# Patient Record
Sex: MALE | Race: BLACK OR AFRICAN AMERICAN | Employment: FULL TIME | URBAN - METROPOLITAN AREA
[De-identification: names, ages, dates, MRNs, and addresses within clinical notes are randomized per-mention and may not be internally consistent; named-entity substitution may affect disease eponyms.]

---

## 2018-10-04 ENCOUNTER — APPOINTMENT (OUTPATIENT)
Dept: GENERAL RADIOLOGY | Age: 48
End: 2018-10-04

## 2018-10-04 ENCOUNTER — HOSPITAL ENCOUNTER (EMERGENCY)
Age: 48
Discharge: HOME | End: 2018-10-04
Attending: EMERGENCY MEDICINE

## 2018-10-04 VITALS
BODY MASS INDEX: 22.13 KG/M2 | RESPIRATION RATE: 16 BRPM | TEMPERATURE: 98.1 F | SYSTOLIC BLOOD PRESSURE: 142 MMHG | HEIGHT: 67 IN | DIASTOLIC BLOOD PRESSURE: 94 MMHG | HEART RATE: 81 BPM | WEIGHT: 141 LBS | OXYGEN SATURATION: 99 %

## 2018-10-04 DIAGNOSIS — M89.8X1 PAIN OF LEFT SCAPULA: ICD-10-CM

## 2018-10-04 DIAGNOSIS — M25.532 LEFT WRIST PAIN: Primary | ICD-10-CM

## 2018-10-04 PROCEDURE — 6370000000 HC RX 637 (ALT 250 FOR IP): Performed by: EMERGENCY MEDICINE

## 2018-10-04 PROCEDURE — 73010 X-RAY EXAM OF SHOULDER BLADE: CPT

## 2018-10-04 PROCEDURE — 73110 X-RAY EXAM OF WRIST: CPT

## 2018-10-04 PROCEDURE — 99283 EMERGENCY DEPT VISIT LOW MDM: CPT

## 2018-10-04 RX ORDER — LISINOPRIL 40 MG/1
40 TABLET ORAL DAILY
COMMUNITY

## 2018-10-04 RX ORDER — METOPROLOL TARTRATE 50 MG/1
50 TABLET, FILM COATED ORAL 2 TIMES DAILY
COMMUNITY

## 2018-10-04 RX ORDER — IBUPROFEN 800 MG/1
800 TABLET ORAL EVERY 8 HOURS PRN
Qty: 30 TABLET | Refills: 0 | Status: SHIPPED | OUTPATIENT
Start: 2018-10-04

## 2018-10-04 RX ORDER — IBUPROFEN 800 MG/1
800 TABLET ORAL ONCE
Status: COMPLETED | OUTPATIENT
Start: 2018-10-04 | End: 2018-10-04

## 2018-10-04 RX ADMIN — IBUPROFEN 800 MG: 800 TABLET ORAL at 22:23

## 2018-10-04 ASSESSMENT — PAIN SCALES - GENERAL
PAINLEVEL_OUTOF10: 9
PAINLEVEL_OUTOF10: 9

## 2018-10-04 ASSESSMENT — PAIN DESCRIPTION - ORIENTATION: ORIENTATION: LEFT

## 2018-10-04 ASSESSMENT — ENCOUNTER SYMPTOMS
COLOR CHANGE: 0
SHORTNESS OF BREATH: 0
CHEST TIGHTNESS: 0
ABDOMINAL PAIN: 0
BACK PAIN: 0
NAUSEA: 0
VOMITING: 0

## 2018-10-04 ASSESSMENT — PAIN DESCRIPTION - PAIN TYPE: TYPE: ACUTE PAIN

## 2018-10-04 ASSESSMENT — PAIN DESCRIPTION - LOCATION: LOCATION: SHOULDER
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
## 2023-02-26 ENCOUNTER — HOSPITAL ENCOUNTER (OUTPATIENT)
Age: 53
Setting detail: OBSERVATION
Discharge: HOME | End: 2023-02-27
Attending: EMERGENCY MEDICINE | Admitting: EMERGENCY MEDICINE
Payer: COMMERCIAL

## 2023-02-26 ENCOUNTER — APPOINTMENT (OUTPATIENT)
Dept: GENERAL RADIOLOGY | Age: 53
End: 2023-02-26
Payer: COMMERCIAL

## 2023-02-26 DIAGNOSIS — R07.9 CHEST PAIN, UNSPECIFIED TYPE: Primary | ICD-10-CM

## 2023-02-26 LAB
ABSOLUTE EOS #: 0.21 K/UL (ref 0–0.44)
ABSOLUTE IMMATURE GRANULOCYTE: <0.03 K/UL (ref 0–0.3)
ABSOLUTE LYMPH #: 1.81 K/UL (ref 1.1–3.7)
ABSOLUTE MONO #: 0.46 K/UL (ref 0.1–1.2)
ANION GAP SERPL CALCULATED.3IONS-SCNC: 11 MMOL/L (ref 9–17)
ANION GAP SERPL CALCULATED.3IONS-SCNC: 13 MMOL/L (ref 9–17)
ANION GAP SERPL CALCULATED.3IONS-SCNC: 14 MMOL/L (ref 9–17)
BASOPHILS # BLD: 1 % (ref 0–2)
BASOPHILS ABSOLUTE: 0.04 K/UL (ref 0–0.2)
BNP SERPL-MCNC: 178 PG/ML
BUN SERPL-MCNC: 17 MG/DL (ref 6–20)
BUN SERPL-MCNC: 17 MG/DL (ref 6–20)
BUN SERPL-MCNC: 19 MG/DL (ref 6–20)
CALCIUM SERPL-MCNC: 8.8 MG/DL (ref 8.6–10.4)
CALCIUM SERPL-MCNC: 9 MG/DL (ref 8.6–10.4)
CALCIUM SERPL-MCNC: 9.3 MG/DL (ref 8.6–10.4)
CHLORIDE SERPL-SCNC: 105 MMOL/L (ref 98–107)
CHLORIDE SERPL-SCNC: 106 MMOL/L (ref 98–107)
CHLORIDE SERPL-SCNC: 107 MMOL/L (ref 98–107)
CO2 SERPL-SCNC: 22 MMOL/L (ref 20–31)
CO2 SERPL-SCNC: 23 MMOL/L (ref 20–31)
CO2 SERPL-SCNC: 23 MMOL/L (ref 20–31)
CREAT SERPL-MCNC: 1.84 MG/DL (ref 0.7–1.2)
CREAT SERPL-MCNC: 1.86 MG/DL (ref 0.7–1.2)
CREAT SERPL-MCNC: 1.89 MG/DL (ref 0.7–1.2)
EOSINOPHILS RELATIVE PERCENT: 4 % (ref 1–4)
FLUAV AG SPEC QL: NEGATIVE
FLUBV AG SPEC QL: NEGATIVE
GFR SERPL CREATININE-BSD FRML MDRD: 42 ML/MIN/1.73M2
GFR SERPL CREATININE-BSD FRML MDRD: 43 ML/MIN/1.73M2
GFR SERPL CREATININE-BSD FRML MDRD: 44 ML/MIN/1.73M2
GLUCOSE SERPL-MCNC: 100 MG/DL (ref 70–99)
GLUCOSE SERPL-MCNC: 75 MG/DL (ref 70–99)
GLUCOSE SERPL-MCNC: 85 MG/DL (ref 70–99)
HCT VFR BLD AUTO: 39 % (ref 40.7–50.3)
HGB BLD-MCNC: 12.3 G/DL (ref 13–17)
IMMATURE GRANULOCYTES: 0 %
LYMPHOCYTES # BLD: 33 % (ref 24–43)
MCH RBC QN AUTO: 22.6 PG (ref 25.2–33.5)
MCHC RBC AUTO-ENTMCNC: 31.5 G/DL (ref 28.4–34.8)
MCV RBC AUTO: 71.6 FL (ref 82.6–102.9)
MONOCYTES # BLD: 8 % (ref 3–12)
NRBC AUTOMATED: 0 PER 100 WBC
PDW BLD-RTO: 19.1 % (ref 11.8–14.4)
PLATELET # BLD AUTO: 312 K/UL (ref 138–453)
PMV BLD AUTO: 9.5 FL (ref 8.1–13.5)
POTASSIUM SERPL-SCNC: 4.1 MMOL/L (ref 3.7–5.3)
POTASSIUM SERPL-SCNC: 4.1 MMOL/L (ref 3.7–5.3)
POTASSIUM SERPL-SCNC: 4.5 MMOL/L (ref 3.7–5.3)
RBC # BLD: 5.45 M/UL (ref 4.21–5.77)
RBC # BLD: ABNORMAL 10*6/UL
SARS-COV-2 RDRP RESP QL NAA+PROBE: NOT DETECTED
SEG NEUTROPHILS: 54 % (ref 36–65)
SEGMENTED NEUTROPHILS ABSOLUTE COUNT: 2.98 K/UL (ref 1.5–8.1)
SODIUM SERPL-SCNC: 141 MMOL/L (ref 135–144)
SODIUM SERPL-SCNC: 141 MMOL/L (ref 135–144)
SODIUM SERPL-SCNC: 142 MMOL/L (ref 135–144)
SPECIMEN DESCRIPTION: NORMAL
TROPONIN I SERPL DL<=0.01 NG/ML-MCNC: 25 NG/L (ref 0–22)
TROPONIN I SERPL DL<=0.01 NG/ML-MCNC: 25 NG/L (ref 0–22)
WBC # BLD AUTO: 5.5 K/UL (ref 3.5–11.3)

## 2023-02-26 PROCEDURE — 83880 ASSAY OF NATRIURETIC PEPTIDE: CPT

## 2023-02-26 PROCEDURE — 6360000002 HC RX W HCPCS: Performed by: STUDENT IN AN ORGANIZED HEALTH CARE EDUCATION/TRAINING PROGRAM

## 2023-02-26 PROCEDURE — 2580000003 HC RX 258: Performed by: STUDENT IN AN ORGANIZED HEALTH CARE EDUCATION/TRAINING PROGRAM

## 2023-02-26 PROCEDURE — 84484 ASSAY OF TROPONIN QUANT: CPT

## 2023-02-26 PROCEDURE — G0378 HOSPITAL OBSERVATION PER HR: HCPCS

## 2023-02-26 PROCEDURE — 36415 COLL VENOUS BLD VENIPUNCTURE: CPT

## 2023-02-26 PROCEDURE — 93005 ELECTROCARDIOGRAM TRACING: CPT | Performed by: EMERGENCY MEDICINE

## 2023-02-26 PROCEDURE — 96360 HYDRATION IV INFUSION INIT: CPT

## 2023-02-26 PROCEDURE — 99285 EMERGENCY DEPT VISIT HI MDM: CPT

## 2023-02-26 PROCEDURE — 6370000000 HC RX 637 (ALT 250 FOR IP): Performed by: STUDENT IN AN ORGANIZED HEALTH CARE EDUCATION/TRAINING PROGRAM

## 2023-02-26 PROCEDURE — 96372 THER/PROPH/DIAG INJ SC/IM: CPT

## 2023-02-26 PROCEDURE — 71045 X-RAY EXAM CHEST 1 VIEW: CPT

## 2023-02-26 PROCEDURE — 87635 SARS-COV-2 COVID-19 AMP PRB: CPT

## 2023-02-26 PROCEDURE — 85025 COMPLETE CBC W/AUTO DIFF WBC: CPT

## 2023-02-26 PROCEDURE — 6370000000 HC RX 637 (ALT 250 FOR IP): Performed by: EMERGENCY MEDICINE

## 2023-02-26 PROCEDURE — 2580000003 HC RX 258

## 2023-02-26 PROCEDURE — 87804 INFLUENZA ASSAY W/OPTIC: CPT

## 2023-02-26 PROCEDURE — 80048 BASIC METABOLIC PNL TOTAL CA: CPT

## 2023-02-26 RX ORDER — SODIUM CHLORIDE 0.9 % (FLUSH) 0.9 %
5-40 SYRINGE (ML) INJECTION EVERY 12 HOURS SCHEDULED
Status: DISCONTINUED | OUTPATIENT
Start: 2023-02-26 | End: 2023-02-27 | Stop reason: HOSPADM

## 2023-02-26 RX ORDER — ACETAMINOPHEN 325 MG/1
650 TABLET ORAL EVERY 4 HOURS PRN
Status: DISCONTINUED | OUTPATIENT
Start: 2023-02-26 | End: 2023-02-27 | Stop reason: HOSPADM

## 2023-02-26 RX ORDER — IBUPROFEN 800 MG/1
800 TABLET ORAL ONCE
Status: COMPLETED | OUTPATIENT
Start: 2023-02-26 | End: 2023-02-26

## 2023-02-26 RX ORDER — ONDANSETRON 2 MG/ML
4 INJECTION INTRAMUSCULAR; INTRAVENOUS EVERY 6 HOURS PRN
Status: DISCONTINUED | OUTPATIENT
Start: 2023-02-26 | End: 2023-02-27 | Stop reason: HOSPADM

## 2023-02-26 RX ORDER — CARVEDILOL 12.5 MG/1
12.5 TABLET ORAL 2 TIMES DAILY WITH MEALS
Status: DISCONTINUED | OUTPATIENT
Start: 2023-02-26 | End: 2023-02-27 | Stop reason: HOSPADM

## 2023-02-26 RX ORDER — 0.9 % SODIUM CHLORIDE 0.9 %
1000 INTRAVENOUS SOLUTION INTRAVENOUS ONCE
Status: DISCONTINUED | OUTPATIENT
Start: 2023-02-26 | End: 2023-02-26

## 2023-02-26 RX ORDER — 0.9 % SODIUM CHLORIDE 0.9 %
500 INTRAVENOUS SOLUTION INTRAVENOUS ONCE
Status: COMPLETED | OUTPATIENT
Start: 2023-02-26 | End: 2023-02-26

## 2023-02-26 RX ORDER — ASPIRIN 81 MG/1
324 TABLET, CHEWABLE ORAL ONCE
Status: COMPLETED | OUTPATIENT
Start: 2023-02-26 | End: 2023-02-26

## 2023-02-26 RX ORDER — SODIUM CHLORIDE 0.9 % (FLUSH) 0.9 %
5-40 SYRINGE (ML) INJECTION PRN
Status: DISCONTINUED | OUTPATIENT
Start: 2023-02-26 | End: 2023-02-27 | Stop reason: HOSPADM

## 2023-02-26 RX ORDER — SODIUM CHLORIDE 9 MG/ML
INJECTION, SOLUTION INTRAVENOUS PRN
Status: DISCONTINUED | OUTPATIENT
Start: 2023-02-26 | End: 2023-02-27 | Stop reason: HOSPADM

## 2023-02-26 RX ORDER — AMLODIPINE BESYLATE 5 MG/1
5 TABLET ORAL DAILY
Status: DISCONTINUED | OUTPATIENT
Start: 2023-02-26 | End: 2023-02-27 | Stop reason: HOSPADM

## 2023-02-26 RX ORDER — ONDANSETRON 4 MG/1
4 TABLET, ORALLY DISINTEGRATING ORAL EVERY 8 HOURS PRN
Status: DISCONTINUED | OUTPATIENT
Start: 2023-02-26 | End: 2023-02-27 | Stop reason: HOSPADM

## 2023-02-26 RX ORDER — ENOXAPARIN SODIUM 100 MG/ML
40 INJECTION SUBCUTANEOUS DAILY
Status: DISCONTINUED | OUTPATIENT
Start: 2023-02-26 | End: 2023-02-27 | Stop reason: HOSPADM

## 2023-02-26 RX ORDER — BENZONATATE 100 MG/1
100 CAPSULE ORAL ONCE
Status: COMPLETED | OUTPATIENT
Start: 2023-02-26 | End: 2023-02-26

## 2023-02-26 RX ORDER — LISINOPRIL 20 MG/1
40 TABLET ORAL DAILY
Status: DISCONTINUED | OUTPATIENT
Start: 2023-02-26 | End: 2023-02-26

## 2023-02-26 RX ADMIN — ASPIRIN 81 MG 324 MG: 81 TABLET ORAL at 03:45

## 2023-02-26 RX ADMIN — IBUPROFEN 800 MG: 800 TABLET, FILM COATED ORAL at 03:30

## 2023-02-26 RX ADMIN — BENZONATATE 100 MG: 100 CAPSULE ORAL at 03:30

## 2023-02-26 RX ADMIN — SODIUM CHLORIDE, PRESERVATIVE FREE 10 ML: 5 INJECTION INTRAVENOUS at 19:49

## 2023-02-26 RX ADMIN — LISINOPRIL 40 MG: 20 TABLET ORAL at 08:12

## 2023-02-26 RX ADMIN — ENOXAPARIN SODIUM 40 MG: 100 INJECTION SUBCUTANEOUS at 19:49

## 2023-02-26 RX ADMIN — AMLODIPINE BESYLATE 5 MG: 5 TABLET ORAL at 19:49

## 2023-02-26 RX ADMIN — SODIUM CHLORIDE 500 ML: 0.9 INJECTION, SOLUTION INTRAVENOUS at 11:45

## 2023-02-26 RX ADMIN — CARVEDILOL 12.5 MG: 12.5 TABLET, FILM COATED ORAL at 12:43

## 2023-02-26 RX ADMIN — SODIUM CHLORIDE, PRESERVATIVE FREE 10 ML: 5 INJECTION INTRAVENOUS at 08:13

## 2023-02-26 ASSESSMENT — ENCOUNTER SYMPTOMS
SHORTNESS OF BREATH: 1
COUGH: 1

## 2023-02-26 NOTE — ED PROVIDER NOTES
[facility] ED  Emergency Department Encounter  Emergency Medicine Resident     Pt Name: Vladimir Low  [de-identified]  [place] 1970  Date of evaluation: 2/26/23  PCP:  No primary care provider on file. CHIEF COMPLAINT       Chief Complaint   Patient presents with    Cough    Nasal Congestion    Generalized Body Aches     HISTORY OF PRESENT ILLNESS  (Location/Symptom, Timing/Onset, Context/Setting, Quality, Duration, ModifyingFactors, Severity.)      Vladimir Low is a 46 y.o. male with PMH of hypertension who presents for evaluation of congestion, cough, fatigue, myalgias, shortness of breath, diarrhea. Denies fever, chest pain, nausea, vomiting, abdominal pain, constipation, blood in stool. Patient is not vaccinated for COVID or flu. PAST MEDICAL / SURGICAL / SOCIAL /FAMILY HISTORY      has a past medical history of Hypertension. No other pertinent PMH on review with patient/guardian. has a past surgical history that includes back surgery. No other pertinent PSH on review with patient/guardian. Social History     Socioeconomic History    Marital status: Single     Spouse name: Not on file    Number of children: Not on file    Years of education: Not on file    Highest education level: Not on file   Occupational History    Not on file   Tobacco Use    Smoking status: Former    Smokeless tobacco: Never   Substance and Sexual Activity    Alcohol use: Yes     Comment: daily    Drug use: No    Sexual activity: Not on file   Other Topics Concern    Not on file   Social History Narrative    Not on file     Social Determinants of Health     Financial Resource Strain: Not on file   Food Insecurity: Not on file   Transportation Needs: Not on file   Physical Activity: Not on file   Stress: Not on file   Social Connections: Not on file   Intimate Partner Violence: Not on file   Housing Stability: Not on file       I counseled the patient against using tobacco products. History reviewed. No pertinent family history. No other pertinent FamHx on review with patient/guardian. Allergies:  Patient has no known allergies. Home Medications:  Prior to Admission medications    Medication Sig Start Date End Date Taking? Authorizing Provider   lisinopril (PRINIVIL;ZESTRIL) 40 MG tablet Take 40 mg by mouth daily    Historical Provider, MD       REVIEW OF SYSTEMS    (2-9 systems for level 4, 10 ormore for level 5)      Review of Systems   Constitutional:  Positive for fatigue. HENT:  Positive for congestion. Respiratory:  Positive for cough and shortness of breath. PHYSICAL EXAM   (up to 7 for level 4, 8 or more for level 5)      INITIAL VITALS:   BP (!) 144/88   Pulse 75   Temp 97 °F (36.1 °C) (Oral)   Resp 18   SpO2 99%     Physical Exam  Constitutional:       General: He is not in acute distress. Appearance: Normal appearance. He is not ill-appearing, toxic-appearing or diaphoretic. HENT:      Head: Normocephalic and atraumatic. Right Ear: External ear normal.      Left Ear: External ear normal.      Nose: Congestion and rhinorrhea present. Eyes:      General:         Right eye: No discharge. Left eye: No discharge. Cardiovascular:      Rate and Rhythm: Normal rate and regular rhythm. Pulses: Normal pulses. Heart sounds: No murmur heard. Pulmonary:      Effort: Pulmonary effort is normal. No respiratory distress. Breath sounds: Normal breath sounds. No wheezing, rhonchi or rales. Abdominal:      Palpations: Abdomen is soft. Tenderness: There is no abdominal tenderness. Skin:     Capillary Refill: Capillary refill takes less than 2 seconds. Neurological:      General: No focal deficit present. Mental Status: He is alert.      DIFFERENTIAL  DIAGNOSIS     PLAN (LABS / IMAGING / EKG):  Orders Placed This Encounter   Procedures    COVID-19, Rapid    RAPID INFLUENZA A/B ANTIGENS       MEDICATIONS ORDERED:  Orders Placed This Encounter Medications    ibuprofen (ADVIL;MOTRIN) tablet 800 mg    benzonatate (TESSALON) capsule 100 mg    benzocaine-menthol (CEPACOL SORE THROAT) lozenge 1 lozenge       DIAGNOSTIC RESULTS / EMERGENCY DEPARTMENT COURSE / MDM     LABS:  No results found for this visit on 02/26/23. IMPRESSION/MDM/ED COURSE:  46 y.o. male presented with hypertension who presents for evaluation of congestion, cough, fatigue, myalgias, shortness of breath, diarrhea. Patient hypertensive but afebrile vitals otherwise unremarkable. On exam patient appears uncomfortable but nontoxic. Heart RRR, lungs clear. Abdomen soft nontender. COVID and influenza obtained. Patient later complained of chest pain to attending so cardiac work-up added. EKG without acute ischemic changes. ED Course as of 02/26/23 0601   Sun Feb 26, 2023   0428 CBC with Auto Differential(!):    WBC 5.5   RBC 5.45   Hemoglobin Quant 12.3(!)   Hematocrit 39.0(!)   MCV 71.6(!)   MCH 22.6(!)   MCHC 31.5   RDW 19.1(!)   Platelet Count 515   MPV 9.5   NRBC Automated 0.0   Seg Neutrophils 54   Lymphocytes 33   Monocytes 8   Eosinophils % 4   Basophils 1   Immature Granulocytes 0   Segs Absolute 2.98   Absolute Lymph # 1.81   Absolute Mono # 0.46   Absolute Eos # 0.21   Basophils Absolute 0.04   Absolute Immature Granulocyte <0.03   RBC Morphology ANISOCYTOSIS PRESENT [AF]   0428 COVID-19, Rapid:    Specimen Description . NASOPHARYNGEAL SWAB   SARS-CoV-2, Rapid Not Detected [AF]   1669 RAPID INFLUENZA A/B ANTIGENS:    Flu A Antigen NEGATIVE   Flu B Antigen NEGATIVE [AF]   0456 Troponin(!):    Troponin, High Sensitivity 25(!) [AF]   0536 Troponin(!):    Troponin, High Sensitivity 25(!) [AF]   [id number] Care everywhere reviewed. Renal function is baseline. No previous troponin available for comparison. Suspect that symptoms are likely due to viral URI however given elevation in troponin with no baseline will admit to observation for evaluation by cardiology.   [AF]      ED Course User Index  [AF] Jens Goodman DO     RADIOLOGY:  No orders to display     EKG  Normal sinus rhythm. Normal axis. No ST elevation or depression. No T wave changes. All EKG's are interpreted by the Emergency Department Physician who either signs or Co-signs this chart in the absence of a cardiologist.    PROCEDURES:  None    CONSULTS:  None    FINAL IMPRESSION      1. Chest pain, unspecified type          DISPOSITION / PLAN     DISPOSITION        PATIENT REFERREDTO:  No follow-up provider specified.     DISCHARGE MEDICATIONS:  New Prescriptions    No medications on file       Joce Ayala DO  PGY 3  Resident Physician Emergency Medicine  02/26/23 3:26 AM    (Please note that portions of this note were completed with a voice recognition program.Efforts were made to edit the dictations but occasionally words are mis-transcribed.)       Jens Goodman DO  Resident  02/26/23 6502

## 2023-02-26 NOTE — CONSULTS
[facility]    Consult / H&P               Today's Date: 2/26/2023  Patient Name: Reese Peterson  Date of admission: 2/26/2023  3:12 AM  Patient's age: 46 y.o., 1970  Admission Dx: Chest pain [R07.9]  Chest pain, unspecified type [R07.9]    Reason for Consult:  Cardiac evaluation    Requesting Physician: Anjelica Hughes MD    CHIEF COMPLAINT: Cough, nasal congestion, generalized body aches and pain. History Obtained From:  patient, electronic medical record    HISTORY OF PRESENT ILLNESS:    This patient 46y.o. years old with past medical history significant for hypertension presented to the ER with cough, nasal congestion, diarrhea, shortness of breath and myalgia. Patient is not vaccinated for COVID or flu. Cardiology was consulted because troponin was 25 and repeat was 22. Found to have CASPER with creatinine of 1.84. Rapid flu and COVID both were negative. Chest x-ray normal.  Past Medical History:   has a past medical history of Hypertension. Past Surgical History:   has a past surgical history that includes back surgery. Home Medications:    Prior to Admission medications    Medication Sig Start Date End Date Taking? Authorizing Provider   lisinopril (PRINIVIL;ZESTRIL) 40 MG tablet Take 40 mg by mouth daily    Historical Provider, MD       Allergies:  Pork-derived products    Social History:   reports that he has quit smoking. He has never used smokeless tobacco. He reports current alcohol use. He reports that he does not use drugs. Family History: family history is not on file. REVIEW OF SYSTEMS:    Constitutional: there has been no unanticipated weight loss. There's been No change in energy level, No change in activity level. Eyes: No visual changes or diplopia. No scleral icterus. ENT: No Headaches  Cardiovascular: as per HPI  Respiratory: as per HPI  Gastrointestinal: No abdominal pain. No change in bowel or bladder habits.   Genitourinary: No dysuria, trouble voiding, or hematuria. Musculoskeletal:  No gait disturbance, No weakness or joint complaints. Integumentary: No rash or pruritis. Neurological: No headache, diplopia, change in muscle strength, numbness or tingling. No change in gait, balance, coordination, mood, affect, memory, mentation, behavior. Endocrine: No temperature intolerance. No excessive thirst, fluid intake, or urination. No tremor. Hematologic/Lymphatic: No abnormal bruising or bleeding, blood clots or swollen lymph nodes. Allergic/Immunologic: No nasal congestion or hives. PHYSICAL EXAM:      BP (!) 182/110   Pulse 76   Temp 97.5 °F (36.4 °C) (Oral)   Resp 18   Ht 5' 7\" (1.702 m)   Wt 125 lb 10.6 oz (57 kg)   SpO2 98%   BMI 19.68 kg/m²    Constitutional and General Appearance: alert, cooperative, no distress and appears stated age  HEENT: PERRL, no cervical lymphadenopathy. No masses palpable. Normal oral mucosa  Respiratory:  Resp Auscultation: Good respiratory effort. No for increased work of breathing. On auscultation: clear to auscultation bilaterally  Cardiovascular: The apical impulse is not displaced  regular S1 and S2.  Jugular venous pulsation Normal  Peripheral pulses are symmetrical and full   Abdomen:   No masses or tenderness  Bowel sounds present  Extremities:   No Cyanosis or Clubbing   Lower extremity edema: No   Skin: Warm and dry  Neurological:  Deferred     Labs:   CBC:   Recent Labs     02/26/23  0354   WBC 5.5   HGB 12.3*   HCT 39.0*        BMP:   Recent Labs     02/26/23  0354      K 4.1   CO2 23   BUN 17   CREATININE 1.84*   LABGLOM 44*   GLUCOSE 75     BNP: No results for input(s): BNP in the last 72 hours. PT/INR: No results for input(s): PROTIME, INR in the last 72 hours. APTT:No results for input(s): APTT in the last 72 hours. CARDIAC ENZYMES:No results for input(s): CKTOTAL, CKMB, CKMBINDEX, TROPONINI in the last 72 hours.   FASTING LIPID PANEL:No results found for: HDL, LDLDIRECT, LDLCALC, TRIG  LIVER PROFILE:No results for input(s): AST, ALT, LABALBU in the last 72 hours. IMPRESSION:    Minimally elevated troponin likely from CASPER and possible viral infection. EKG without ischemic changes. Hypertension    RECOMMENDATIONS:  EKG showed normal sinus rhythm no ischemic changes. Minimally elevated troponin with a flat trend. Treatment of underlying viral infection        Perla Ford MD. PGY- 4  Cardiology Fellow  [place], [place]          Attending Physician Statement:    I have discussed the care of  Robert Sidhu , including pertinent history and exam findings, with the Cardiology fellow/resident. I have seen and examined the patient and the key elements of all parts of the encounter have been performed by me. I agree with the assessment, plan and orders as documented by the fellow/resident, after I modified exam findings and plan of treatments, and the final version is my approved version of the assessment. Additional Comments: CASPER- HS trop minimal elevation likely due to CASPER. IVF. DC lisinopril. Coreg 12.5mg po BId and norvasc 5mg po qday.  Obtain TTE    Tato José MD

## 2023-02-26 NOTE — ED PROVIDER NOTES
[facility]     Emergency Department     Faculty Attestation    I performed a history and physical examination of the patient and discussed management with the resident. I have reviewed and agree with the residents findings including all diagnostic interpretations, and treatment plans as written. Any areas of disagreement are noted on the chart. I was personally present for the key portions of any procedures. I have documented in the chart those procedures where I was not present during the key portions. I have reviewed the emergency nurses triage note. I agree with the chief complaint, past medical history, past surgical history, allergies, medications, social and family history as documented unless otherwise noted below. Documentation of the HPI, Physical Exam and Medical Decision Making performed by scriblopez is based on my personal performance of the HPI, PE and MDM. For Physician Assistant/ Nurse Practitioner cases/documentation I have personally evaluated this patient and have completed at least one if not all key elements of the E/M (history, physical exam, and MDM). Additional findings are as noted. 45 yo M c/o cough, aches, weakness, no fever, c/o cp, & nausea,   PE vss gcs 15 neck supple, no cervical tenderness, L chest tender without crepitus, abdomen non tender, no distension, no rigidity,   Pt drinking liquid without difficulty,     Trop 25 & 25, observation admit d/t trop, asa     EKG Interpretation    Interpreted by me  Normal sinus, hr 65, no ischemia, normal axis, qtc 426,     CRITICAL CARE: There was a high probability of clinically significant/life threatening deterioration in this patient's condition which required my urgent intervention. Total critical care time was 5 minutes. This excludes any time for separately reportable procedures.        Ney Phillips, DO  02/26/23 22 Pollen [place], DO  02/26/23 Ramez 106, DO  02/26/23 2249

## 2023-02-26 NOTE — ED NOTES
The following labs were labeled with appropriate pt sticker and tubed to lab:     [] Blue     [] Lavender   [] on ice  [x] Green/yellow  [] Green/black [] on ice  [] Henrine Bashir  [] on ice  [] Yellow  [] Red  [] Type/ Screen  [] ABG  [] VBG    [] COVID-19 swab    [] Rapid  [] PCR  [] Flu swab  [] Peds Viral Panel     [] Urine Sample  [] Fecal Sample  [] Pelvic Cultures  [] Blood Cultures  [] X 2  [] STREP Cultures       Lalita Joaquin RN  02/26/23 3376

## 2023-02-26 NOTE — H&P
[address] [employer]  CDU / OBSERVATION ENCOUNTER  ATTENDING NOTE     Pt Name: Alphia Landau  MRN: [[id number]]  [place] 1970  Date of evaluation: 2/26/23  Patient's PCP is : No primary care provider on file. CHIEF COMPLAINT       Chief Complaint   Patient presents with    Cough    Nasal Congestion    Generalized Body Aches         HISTORY OF PRESENT ILLNESS    Alphia Landau is a 46 y.o. male who presents with PMH of hypertension who presents for evaluation of congestion, cough, fatigue, myalgias, shortness of breath, diarrhea. Denies fever, chest pain, nausea, vomiting, abdominal pain, constipation, blood in stool. Patient is not vaccinated for COVID or flu. Patient has elevated troponin in the ED. Unknown history of kidney disease. No additional labs. Patient denies any flank pain. No urinary symptoms. Still having no chest pain this morning. Location/Symptom: Respiratory symptoms  Timing/Onset: Progressive onset  Provocation: None  Quality: N/A  Radiation: N/A  Severity: Moderate  Timing/Duration: 4 to 5 days  Modifying Factors: None    History was obtained in part through review of the ED chart. When possible, a direct discussion was had with ED nurses, residents, and attendings  REVIEW OF SYSTEMS       General ROS - No fevers, + malaise   Ophthalmic ROS - No discharge, No changes in vision  ENT ROS -  No sore throat, No rhinorrhea,   Respiratory ROS -+ shortness of breath, + cough, + wheezing  Cardiovascular ROS - No chest pain, no dyspnea on exertion  Gastrointestinal ROS - No abdominal pain, no nausea or vomiting, no change in bowel habits, no black or bloody stools  Genito-Urinary ROS - No dysuria, trouble voiding, or hematuria  Musculoskeletal ROS - No myalgias, No arthalgias  Neurological ROS - No headache, no dizziness/lightheadedness, No focal weakness, no loss of sensation  Dermatological ROS - No lesions, No rash     (PQRS) Advance directives on face sheet per hospital policy. No change unless specifically mentioned in chart    PAST MEDICAL HISTORY    has a past medical history of Hypertension. I have reviewed the past medical history with the patient and it is pertinent to this complaint. SURGICAL HISTORY      has a past surgical history that includes back surgery. I have reviewed and agree with Surgical History entered and it is pertinent to this complaint. CURRENT MEDICATIONS     sodium chloride flush 0.9 % injection 5-40 mL, 2 times per day  sodium chloride flush 0.9 % injection 5-40 mL, PRN  0.9 % sodium chloride infusion, PRN  enoxaparin (LOVENOX) injection 40 mg, Daily  acetaminophen (TYLENOL) tablet 650 mg, Q4H PRN  ondansetron (ZOFRAN-ODT) disintegrating tablet 4 mg, Q8H PRN   Or  ondansetron (ZOFRAN) injection 4 mg, Q6H PRN  0.9 % sodium chloride bolus, Once  carvedilol (COREG) tablet 12.5 mg, BID WC  amLODIPine (NORVASC) tablet 5 mg, Daily      All medication charted and reviewed. ALLERGIES     is allergic to pork-derived products. FAMILY HISTORY     has no family status information on file. family history is not on file. The patient denies any pertinent family history. I have reviewed and agree with the family history entered. I have reviewed the Family History and it is not significant to the case    SOCIAL HISTORY      reports that he has quit smoking. He has never used smokeless tobacco. He reports current alcohol use. He reports that he does not use drugs. I have reviewed and agree with all Social.  There are no concerns for substance abuse/use. PHYSICAL EXAM     INITIAL VITALS:  height is 5' 7\" (1.702 m) and weight is 125 lb 10.6 oz (57 kg). His oral temperature is 97.5 °F (36.4 °C). His blood pressure is 183/98 (abnormal) and his pulse is 76. His respiration is 18 and oxygen saturation is 98%.       CONSTITUTIONAL: AOx4, no apparent distress, appears stated age    HEAD: normocephalic, atraumatic   EYES: PERRLA, EOMI    ENT: moist mucous membranes, uvula midline   NECK: supple, symmetric   BACK: symmetric   LUNGS: clear to auscultation bilaterally   CARDIOVASCULAR: regular rate and rhythm, no murmurs, rubs or gallops   ABDOMEN: soft, non-tender, non-distended with normal active bowel sounds   NEUROLOGIC:  MAEx4, no focal sensory or motor deficits   MUSCULOSKELETAL: no clubbing, cyanosis or edema   SKIN: no rash or wounds       DIFFERENTIAL DIAGNOSIS/MDM:     Respiratory symptoms: Viral respiratory infection, pneumonia, COVID, flu, CHF    NSTEMI    59-year-old male history of hypertension, progressive onset respiratory symptoms, now with elevated troponin in the ED without history of CAD or CKD, creatinine elevated 1.8 on morning labs. Unclear whether creatinine is result of chronic kidney disease versus CASPER. Elevated troponin may be a result of chronic kidney disease, however cannot fully rule out NSTEMI. Cardiology to evaluate. Will give small fluid bolus and recheck BMP this afternoon for decrease in creatinine. We will continue to monitor. DIAGNOSTIC RESULTS     EKG: As interpreted in ED physician note    RADIOLOGY:   I directly visualized the following  images and reviewed the radiologist interpretations:    XR CHEST PORTABLE    Result Date: 2/26/2023  EXAMINATION: ONE XRAY VIEW OF THE CHEST 2/26/2023 3:58 am COMPARISON: None. HISTORY: ORDERING SYSTEM PROVIDED HISTORY: cp TECHNOLOGIST PROVIDED HISTORY: cp Reason for Exam: upr,cough,nasal congestion,body aches FINDINGS: There is no consolidation, pleural effusion, or pneumothorax. Cardiac silhouette is not enlarged and there is no pulmonary vascular congestion. Mediastinum and radha are within normal limits. Bony thorax is unremarkable. No acute cardiopulmonary process. LABS:  I have reviewed and interpreted all available lab results.   Labs Reviewed   CBC WITH AUTO DIFFERENTIAL - Abnormal; Notable for the following components:       Result Value    Hemoglobin 12.3 (*) Hematocrit 39.0 (*)     MCV 71.6 (*)     MCH 22.6 (*)     RDW 19.1 (*)     All other components within normal limits   BASIC METABOLIC PANEL - Abnormal; Notable for the following components:    Creatinine 1.84 (*)     Est, Glom Filt Rate 44 (*)     All other components within normal limits   TROPONIN - Abnormal; Notable for the following components:    Troponin, High Sensitivity 25 (*)     All other components within normal limits   TROPONIN - Abnormal; Notable for the following components:    Troponin, High Sensitivity 25 (*)     All other components within normal limits   COVID-19, RAPID   RAPID INFLUENZA A/B ANTIGENS   BRAIN NATRIURETIC PEPTIDE   BASIC METABOLIC PANEL            CDU IMPRESSION / PLAN      Keara Lane is a 46 y.o. male who presents with cough congestion, myalgias, fatigue sob    Troponinemia  Trop elevated at 25, stable  ECG   Heart score  Cardiology:  Elevated troponin with flat trend likely due to CASPER, recommend IVF  DC lisinopril  Start Coreg 12.5 mg p.o. twice daily and Norvasc 5 mg p.o. daily  Obtain TTE  Continue home medications and pain control  Monitor vitals, labs, and imaging  DISPO: pending establishing trending creatinine to determine acute versus chronic kidney disease, and echo likely tomorrow    CONSULTS:    IP CONSULT TO CARDIOLOGY    PROCEDURES:  Not indicated       PATIENT REFERRED TO:    No follow-up provider specified. --  Maribell Bryson MD   Emergency Medicine Resident    This dictation was generated by voice recognition computer software. Although all attempts are made to edit the dictation for accuracy, there may be errors in the transcription that are not intended.

## 2023-02-26 NOTE — PROGRESS NOTES
[address]  CDU / OBSERVATION ENCOUNTER  ATTENDING NOTE       I performed a history and physical examination of the patient and discussed management with the resident or midlevel provider. I reviewed the resident or midlevel provider's note and agree with the documented findings and plan of care. Any areas of disagreement are noted on the chart. I was personally present for the key portions of any procedures. I have documented in the chart those procedures where I was not present during the key portions. I have reviewed the nurses notes. I agree with the chief complaint, past medical history, past surgical history, allergies, medications, social and family history as documented unless otherwise noted below. The Family history, social history, and ROS are effectively unchanged since admission unless noted elsewhere in the chart. This patient was placed in the observation unit for reevaluation for possible admission to the hospital    Patient admitted to the ETU for chest pain and cardiology evaluation. Patient originally presented to the emergency department complaining of cough, congestion, generalized weakness and fatigue. He was also having some shortness of breath. While he was in the emergency department, he began having chest pain. Work-up in the emergency department was concerning for troponin of 25x2. Patient has no previous troponins in our system so it is unknown if this is elevated from patient's baseline. Cardiology has been consulted. On my exam today, patient denies any current chest pain. He says he still continues to feel generalized weak and fatigued with some nasal congestion. I believe patient most likely has a viral syndrome but will appreciate cardiology recommendations due to the slightly elevated troponins.     Viri Albert MD  Attending Emergency  Physician

## 2023-02-26 NOTE — CARE COORDINATION
Case Management Assessment  Initial Evaluation    Date/Time of Evaluation: 2/26/2023 8:34 AM  Assessment Completed by: Juan Carlos Martinez RN    If patient is discharged prior to next notation, then this note serves as note for discharge by case management. Patient Name: Guero Francois                   YOB: 1970  Diagnosis: Chest pain [R07.9]  Chest pain, unspecified type [R07.9]                   Date / Time: 2/26/2023  3:12 AM    Patient Admission Status: Observation   Readmission Risk (Low < 19, Mod (19-27), High > 27): No data recorded  Current PCP: No primary care provider on file. PCP verified by CM? (P) Yes (Follows with Dr Serenity Toney and last visit was 6 months ago)    Chart Reviewed: Yes      History Provided by: (P) Patient  Patient Orientation: (P) Alert and Oriented    Patient Cognition: (P) Alert    Hospitalization in the last 30 days (Readmission):  Yes    If yes, Readmission Assessment in CM Navigator will be completed.     Advance Directives:      Code Status: Full Code   Patient's Primary Decision Maker is:  self      Discharge Planning:    Patient lives with: (P)  (shelter) Type of Home: (P) Shelter  Primary Care Giver: (P) Self  Patient Support Systems include: (P) Family Members   Current Financial resources:    Current community resources:    Current services prior to admission: (P) None            Current DME:              Type of Home Care services:  (P) None    ADLS  Prior functional level: (P) Independent in ADLs/IADLs  Current functional level: (P) Independent in ADLs/IADLs    PT AM-PAC:   /24  OT AM-PAC:   /24    Family can provide assistance at DC: (P) No  Would you like Case Management to discuss the discharge plan with any other family members/significant others, and if so, who? (P) No  Plans to Return to Present Housing: (P) Yes (currently residing at [facility])  Other Identified Issues/Barriers to RETURNING to current housing: plans to return to shelter  Potential Assistance needed at discharge: (P) N/A            Potential DME:    Patient expects to discharge to: (P) [address] for transportation at discharge: (P) [address]: [facility] / Plan: [facility] / Product Type: *No Product type* /     Does insurance require precert for SNF: Yes    Potential assistance Purchasing Medications: (P) No (uses [facility] on [id number])  Meds-to-Beds request:      No Pharmacies Listed    Notes:    Factors facilitating achievement of predicted outcomes: pleasant    Barriers to discharge: medical clearance    Additional Case Management Notes: goal is home, has transportation    The Plan for Transition of Care is related to the following treatment goals of Chest pain [R07.9]  Chest pain, unspecified type [U14.6]    IF APPLICABLE: The Patient and/or patient representative Marly Obrien and his family were provided with a choice of provider and agrees with the discharge plan. Freedom of choice list with basic dialogue that supports the patient's individualized plan of care/goals and shares the quality data associated with the providers was provided to: (P) Patient   Patient Representative Name:       The Patient and/or Patient Representative Agree with the Discharge Plan?  (P) Yes    Inocencia Chiang RN  Case Management Department  Ph: [phone]

## 2023-02-27 ENCOUNTER — APPOINTMENT (OUTPATIENT)
Dept: NUCLEAR MEDICINE | Age: 53
End: 2023-02-27
Payer: COMMERCIAL

## 2023-02-27 VITALS
RESPIRATION RATE: 18 BRPM | HEIGHT: 67 IN | WEIGHT: 125.66 LBS | TEMPERATURE: 97.7 F | SYSTOLIC BLOOD PRESSURE: 180 MMHG | OXYGEN SATURATION: 100 % | DIASTOLIC BLOOD PRESSURE: 95 MMHG | BODY MASS INDEX: 19.72 KG/M2 | HEART RATE: 59 BPM

## 2023-02-27 LAB
ABSOLUTE EOS #: 0.31 K/UL (ref 0–0.44)
ABSOLUTE IMMATURE GRANULOCYTE: <0.03 K/UL (ref 0–0.3)
ABSOLUTE LYMPH #: 1.27 K/UL (ref 1.1–3.7)
ABSOLUTE MONO #: 0.53 K/UL (ref 0.1–1.2)
ANION GAP SERPL CALCULATED.3IONS-SCNC: 11 MMOL/L (ref 9–17)
BASOPHILS # BLD: 1 % (ref 0–2)
BASOPHILS ABSOLUTE: 0.04 K/UL (ref 0–0.2)
BUN SERPL-MCNC: 16 MG/DL (ref 6–20)
CALCIUM SERPL-MCNC: 9.4 MG/DL (ref 8.6–10.4)
CHLORIDE SERPL-SCNC: 105 MMOL/L (ref 98–107)
CO2 SERPL-SCNC: 23 MMOL/L (ref 20–31)
CREAT SERPL-MCNC: 1.58 MG/DL (ref 0.7–1.2)
EKG ATRIAL RATE: 65 BPM
EKG P AXIS: 73 DEGREES
EKG P-R INTERVAL: 142 MS
EKG Q-T INTERVAL: 410 MS
EKG QRS DURATION: 78 MS
EKG QTC CALCULATION (BAZETT): 426 MS
EKG R AXIS: 22 DEGREES
EKG T AXIS: 43 DEGREES
EKG VENTRICULAR RATE: 65 BPM
EOSINOPHILS RELATIVE PERCENT: 7 % (ref 1–4)
GFR SERPL CREATININE-BSD FRML MDRD: 52 ML/MIN/1.73M2
GLUCOSE SERPL-MCNC: 87 MG/DL (ref 70–99)
HCT VFR BLD AUTO: 36.3 % (ref 40.7–50.3)
HGB BLD-MCNC: 11.3 G/DL (ref 13–17)
IMMATURE GRANULOCYTES: 0 %
LV EF: 57 %
LVEF MODALITY: NORMAL
LYMPHOCYTES # BLD: 28 % (ref 24–43)
MCH RBC QN AUTO: 22.6 PG (ref 25.2–33.5)
MCHC RBC AUTO-ENTMCNC: 31.1 G/DL (ref 28.4–34.8)
MCV RBC AUTO: 72.7 FL (ref 82.6–102.9)
MONOCYTES # BLD: 12 % (ref 3–12)
NRBC AUTOMATED: 0 PER 100 WBC
PDW BLD-RTO: 18 % (ref 11.8–14.4)
PLATELET # BLD AUTO: 218 K/UL (ref 138–453)
PMV BLD AUTO: 10.2 FL (ref 8.1–13.5)
POTASSIUM SERPL-SCNC: 3.9 MMOL/L (ref 3.7–5.3)
RBC # BLD: 4.99 M/UL (ref 4.21–5.77)
RBC # BLD: ABNORMAL 10*6/UL
SEG NEUTROPHILS: 52 % (ref 36–65)
SEGMENTED NEUTROPHILS ABSOLUTE COUNT: 2.41 K/UL (ref 1.5–8.1)
SODIUM SERPL-SCNC: 139 MMOL/L (ref 135–144)
WBC # BLD AUTO: 4.6 K/UL (ref 3.5–11.3)

## 2023-02-27 PROCEDURE — 85025 COMPLETE CBC W/AUTO DIFF WBC: CPT

## 2023-02-27 PROCEDURE — 2580000003 HC RX 258: Performed by: STUDENT IN AN ORGANIZED HEALTH CARE EDUCATION/TRAINING PROGRAM

## 2023-02-27 PROCEDURE — 80048 BASIC METABOLIC PNL TOTAL CA: CPT

## 2023-02-27 PROCEDURE — 93017 CV STRESS TEST TRACING ONLY: CPT

## 2023-02-27 PROCEDURE — 6370000000 HC RX 637 (ALT 250 FOR IP): Performed by: STUDENT IN AN ORGANIZED HEALTH CARE EDUCATION/TRAINING PROGRAM

## 2023-02-27 PROCEDURE — 6360000002 HC RX W HCPCS: Performed by: STUDENT IN AN ORGANIZED HEALTH CARE EDUCATION/TRAINING PROGRAM

## 2023-02-27 PROCEDURE — 36415 COLL VENOUS BLD VENIPUNCTURE: CPT

## 2023-02-27 PROCEDURE — 3430000000 HC RX DIAGNOSTIC RADIOPHARMACEUTICAL: Performed by: STUDENT IN AN ORGANIZED HEALTH CARE EDUCATION/TRAINING PROGRAM

## 2023-02-27 PROCEDURE — 78452 HT MUSCLE IMAGE SPECT MULT: CPT

## 2023-02-27 PROCEDURE — G0378 HOSPITAL OBSERVATION PER HR: HCPCS

## 2023-02-27 PROCEDURE — A9500 TC99M SESTAMIBI: HCPCS | Performed by: STUDENT IN AN ORGANIZED HEALTH CARE EDUCATION/TRAINING PROGRAM

## 2023-02-27 PROCEDURE — 93010 ELECTROCARDIOGRAM REPORT: CPT | Performed by: INTERNAL MEDICINE

## 2023-02-27 RX ORDER — SODIUM CHLORIDE 9 MG/ML
500 INJECTION, SOLUTION INTRAVENOUS CONTINUOUS PRN
Status: DISCONTINUED | OUTPATIENT
Start: 2023-02-27 | End: 2023-02-27 | Stop reason: ALTCHOICE

## 2023-02-27 RX ORDER — TECHNETIUM TC-99M SESTAMIBI 1 MG/10ML
50.4 INJECTION INTRAVENOUS
Status: COMPLETED | OUTPATIENT
Start: 2023-02-27 | End: 2023-02-27

## 2023-02-27 RX ORDER — TECHNETIUM TC-99M SESTAMIBI 1 MG/10ML
13.5 INJECTION INTRAVENOUS
Status: COMPLETED | OUTPATIENT
Start: 2023-02-27 | End: 2023-02-27

## 2023-02-27 RX ORDER — ATROPINE SULFATE 0.1 MG/ML
0.5 INJECTION INTRAVENOUS EVERY 5 MIN PRN
Status: DISCONTINUED | OUTPATIENT
Start: 2023-02-27 | End: 2023-02-27 | Stop reason: ALTCHOICE

## 2023-02-27 RX ORDER — SODIUM CHLORIDE 0.9 % (FLUSH) 0.9 %
10 SYRINGE (ML) INJECTION PRN
Status: DISCONTINUED | OUTPATIENT
Start: 2023-02-27 | End: 2023-02-27 | Stop reason: HOSPADM

## 2023-02-27 RX ORDER — METOPROLOL TARTRATE 5 MG/5ML
5 INJECTION INTRAVENOUS EVERY 5 MIN PRN
Status: DISCONTINUED | OUTPATIENT
Start: 2023-02-27 | End: 2023-02-27 | Stop reason: ALTCHOICE

## 2023-02-27 RX ORDER — SODIUM CHLORIDE 0.9 % (FLUSH) 0.9 %
5-40 SYRINGE (ML) INJECTION PRN
Status: DISCONTINUED | OUTPATIENT
Start: 2023-02-27 | End: 2023-02-27 | Stop reason: ALTCHOICE

## 2023-02-27 RX ORDER — ALBUTEROL SULFATE 90 UG/1
2 AEROSOL, METERED RESPIRATORY (INHALATION) PRN
Status: DISCONTINUED | OUTPATIENT
Start: 2023-02-27 | End: 2023-02-27 | Stop reason: ALTCHOICE

## 2023-02-27 RX ORDER — NITROGLYCERIN 0.4 MG/1
0.4 TABLET SUBLINGUAL EVERY 5 MIN PRN
Status: DISCONTINUED | OUTPATIENT
Start: 2023-02-27 | End: 2023-02-27 | Stop reason: ALTCHOICE

## 2023-02-27 RX ORDER — AMINOPHYLLINE DIHYDRATE 25 MG/ML
50 INJECTION, SOLUTION INTRAVENOUS PRN
Status: DISCONTINUED | OUTPATIENT
Start: 2023-02-27 | End: 2023-02-27 | Stop reason: ALTCHOICE

## 2023-02-27 RX ORDER — CARVEDILOL 12.5 MG/1
12.5 TABLET ORAL 2 TIMES DAILY WITH MEALS
Qty: 60 TABLET | Refills: 3 | Status: SHIPPED | OUTPATIENT
Start: 2023-02-27

## 2023-02-27 RX ORDER — AMLODIPINE BESYLATE 5 MG/1
5 TABLET ORAL DAILY
Qty: 30 TABLET | Refills: 3 | Status: SHIPPED | OUTPATIENT
Start: 2023-02-28

## 2023-02-27 RX ADMIN — SODIUM CHLORIDE, PRESERVATIVE FREE 10 ML: 5 INJECTION INTRAVENOUS at 09:24

## 2023-02-27 RX ADMIN — SODIUM CHLORIDE, PRESERVATIVE FREE 10 ML: 5 INJECTION INTRAVENOUS at 10:43

## 2023-02-27 RX ADMIN — SODIUM CHLORIDE, PRESERVATIVE FREE 10 ML: 5 INJECTION INTRAVENOUS at 10:55

## 2023-02-27 RX ADMIN — CARVEDILOL 12.5 MG: 12.5 TABLET, FILM COATED ORAL at 09:24

## 2023-02-27 RX ADMIN — TETRAKIS(2-METHOXYISOBUTYLISOCYANIDE)COPPER(I) TETRAFLUOROBORATE 50.4 MILLICURIE: 1 INJECTION, POWDER, LYOPHILIZED, FOR SOLUTION INTRAVENOUS at 12:05

## 2023-02-27 RX ADMIN — REGADENOSON 0.4 MG: 0.08 INJECTION, SOLUTION INTRAVENOUS at 10:45

## 2023-02-27 RX ADMIN — SODIUM CHLORIDE, PRESERVATIVE FREE 10 ML: 5 INJECTION INTRAVENOUS at 10:45

## 2023-02-27 RX ADMIN — AMLODIPINE BESYLATE 5 MG: 5 TABLET ORAL at 09:24

## 2023-02-27 RX ADMIN — SODIUM CHLORIDE, PRESERVATIVE FREE 10 ML: 5 INJECTION INTRAVENOUS at 12:05

## 2023-02-27 RX ADMIN — TETRAKIS(2-METHOXYISOBUTYLISOCYANIDE)COPPER(I) TETRAFLUOROBORATE 13.5 MILLICURIE: 1 INJECTION, POWDER, LYOPHILIZED, FOR SOLUTION INTRAVENOUS at 10:55

## 2023-02-27 NOTE — PROGRESS NOTES
[facility]   Consult Note         Today's Date: 2/27/2023  Patient Name: Alphia Landau  Date of admission: 2/26/2023  3:12 AM  Patient's age: 46 y.o., 1970  Admission Dx:  Chest pain [R07.9]  Chest pain, unspecified type [R07.9]    Reason for Consult:  Cardiac evaluation    Requesting Physician: Juan Antonio Kaur MD    REASON FOR CONSULT: Cardiac evaluation    History Obtained From:  Patient, chart, staff, records    HISTORY OF PRESENT ILLNESS:      Pt is a 46 y.o male w/PMHx of hypertension who presented to the ED on 2/26/23 complaining off cough, diarrhea, nasal congestion, shortness of breath, and myalgia. Pt is not vaccinated for COVID or fly. Cardiology was consulted due to troponin of 25 as well as a repeat troponin of 25. He was also found to have an CASPER with creatinine of 1.84. His rapid flu and COVID were negative. CXR was normal.     Interval History:    2/27  Pt is doing well this morning and endorses improvement in his initial symptoms. His creatinine today has gone down to 1.58. Past Medical History: has a past medical history of Hypertension. Past Surgical History: has a past surgical history that includes back surgery. Home Medications:    Prior to Admission medications    Medication Sig Start Date End Date Taking? Authorizing Provider   lisinopril (PRINIVIL;ZESTRIL) 40 MG tablet Take 40 mg by mouth daily    Historical Provider, MD       sodium chloride flush, 5-40 mL, IntraVENous, 2 times per day    enoxaparin, 40 mg, SubCUTAneous, Daily    carvedilol, 12.5 mg, Oral, BID WC    amLODIPine, 5 mg, Oral, Daily    Allergies:  Pork-derived products    Social History:   reports that he has quit smoking. He has never used smokeless tobacco. He reports current alcohol use. He reports that he does not use drugs. Family History: family history is not on file. No h/o sudden cardiac death. REVIEW OF SYSTEMS:    Constitutional: there has been no unanticipated weight loss. There's been No change in energy level, No change in activity level. Eyes: No visual changes or diplopia. No scleral icterus. ENT: No Headaches, hearing loss or vertigo. No mouth sores or sore throat. Cardiovascular: per HPI  Respiratory: Cough   Gastrointestinal: No abdominal pain, appetite loss, blood in stools. No change in bowel or bladder habits. Genitourinary: No dysuria, trouble voiding, or hematuria. Musculoskeletal:  No gait disturbance, No weakness or joint complaints. Integumentary: No rash or pruritis. Neurological: No headache, diplopia, change in muscle strength, numbness or tingling. No change in gait, balance, coordination, mood, affect, memory, mentation, behavior. Psychiatric: No anxiety, or depression. Endocrine: No temperature intolerance. No excessive thirst, fluid intake, or urination. No tremor. Hematologic/Lymphatic: No abnormal bruising or bleeding, blood clots or swollen lymph nodes. Allergic/Immunologic: No nasal congestion or hives. PHYSICAL EXAM:      BP (!) 190/92   Pulse 58   Temp 97.9 °F (36.6 °C) (Oral)   Resp 18   Ht 5' 7\" (1.702 m)   Wt 125 lb 10.6 oz (57 kg)   SpO2 98%   BMI 19.68 kg/m²    Constitutional and General Appearance: alert, cooperative, no distress and appears stated age  HEENT: PERRL, no cervical lymphadenopathy. No masses palpable. Normal oral mucosa  Respiratory:  Normal excursion and expansion without use of accessory muscles  Resp Auscultation: Good respiratory effort. No for increased work of breathing. On auscultation: clear to auscultation bilaterally  Cardiovascular: The apical impulse is not displaced  Heart tones are crisp and normal. regular S1 and S2.  Jugular venous pulsation normal  Peripheral pulses are symmetrical and full   Abdomen:   No masses or tenderness  Bowel sounds present  Extremities:   No Cyanosis or Clubbing   Lower extremity edema: No   Skin: Warm and dry  Neurological:  Alert and oriented.   Moves all extremities well  No abnormalities of mood, affect, memory, mentation, or behavior are noted    EKG:    Date: 2/26/23023  Reading: Normal sinus rhythm, Possible Left atrial enlargement    Labs:     CBC:   Recent Labs     02/26/23  0354 02/27/23  0535   WBC 5.5 4.6   HGB 12.3* 11.3*   HCT 39.0* 36.3*    218     BMP:   Recent Labs     02/26/23  1533 02/27/23  0535    139   K 4.5 3.9   CO2 23 23   BUN 19 16   CREATININE 1.86* 1.58*   LABGLOM 43* 52*   GLUCOSE 100* 87     BNP: No results for input(s): BNP in the last 72 hours. PT/INR: No results for input(s): PROTIME, INR in the last 72 hours. APTT:No results for input(s): APTT in the last 72 hours. CARDIAC ENZYMES:No results for input(s): CKTOTAL, CKMB, CKMBINDEX, TROPONINI in the last 72 hours. FASTING LIPID PANEL:No results found for: HDL, LDLDIRECT, LDLCALC, TRIG  LIVER PROFILE:No results for input(s): AST, ALT, LABALBU in the last 72 hours. Troponins: Invalid input(s): TROPONIN     Other Current Problems  Patient Active Problem List   Diagnosis    Chest pain       IMPRESSION:  Elevated troponins are likely a result of his CASPER and possible viral infection, his EKG shows no ischemic changes  Hypertension    Recommendations:    Cardiolite stress test  Final plan to be discussed with Dr. Isai Garcia      Discussed with patient, family, and Nurse. [facility] ; [place], [place]    Attending note. The patient was seen and examined agree with the evaluation and plan documented above. Presented with chest pain. Not exertional.  Not related to deep breathing or coughing. Associated with shortness of breath. ECG showing nonspecific ST changes with mildly elevated flat high-sensitivity troponins.   We will proceed with a Cardiolite stress test.

## 2023-02-27 NOTE — PROCEDURES
[address]                  [facility] [address]                              CARDIAC STRESS TEST    PATIENT NAME: ALVARO Page                        :        1970  MED REC NO:   [id number]                             ROOM:       [id number]  ACCOUNT NO:   [de-identified]                           ADMIT DATE: 2023  PROVIDER:     Maryanne Jaime    DATE OF STUDY:  2023    ORDERING PROVIDER:  Blanca Narvaez MD    PRIMARY CARE PROVIDER:  None specified    INTERPRETING PHYSICIAN:  Maryanne Jaime MD    LEXISCAN STRESS STUDY:  Indication:  chest pain    Procedure was explained and consent form was signed    Medications:  Lexiscan, 0.4 mg  Resting heart rate:  61 bpm  Resting blood pressure:  175/98 mm/Hg  Infusion heart rate:  88 bpm  Infusion blood pressure:  202/104 mm/Hg  Resting EKG:  Abnormal (Sinus rhythm/ST abnormalities in inferior leads  and V1-V6)  Stress heart response:  Normal response  Stress BP response:  Appropriate  Stress EKG(S):  No changes seen  Chest discomfort:  None  Ischemic EKG changes:  Uninterpretable    Comments:  Abnormal pre-test ECG precludes ECG criteria for myocardial  ischemia. IMPRESSION:  Electrocardiographically uninterpretable Lexiscan stress study. Radio-isotope results to follow from the department of Nuclear Medicine.         Hilda Simmons    D: 2023 12:46:52       T: 2023 12:48:29     AS/IVAN  Job#: [id number]     Doc#: Unknown    CC:    ()

## 2023-02-27 NOTE — PROGRESS NOTES
OBS/CDU   RESIDENT NOTE      Patients PCP is: No primary care provider on file. SUBJECTIVE      No acute events overnight. Patient denies any chest pain or shortness of breath this morning. Continues to be congested but denies fever or chills. Has been able to tolerate a full diet without nausea or vomiting. The patient is urinating on his own and is passing flatus. Denies nausea, vomiting, abdominal pain, focal weakness, numbness, tingling, urinary/bowel symptoms, vision changes, visual hallucinations, or headache. PHYSICAL EXAM      General: NAD, AO X 3  Heent: EMOI, PERRL  Neck: SUPPLE, NO JVD  Cardiovascular: RRR, S1S2  Pulmonary: CTAB, NO SOB  Abdomen: SOFT, NTTP, ND, +BS  Extremities: +2/4 PULSES DISTAL, NO SWELLING  Neuro / Psych: NO NUMBNESS OR TINGLING, MENTATION AT BASELINE    PERTINENT TEST /EXAMS      I have reviewed all available laboratory results. MEDICATIONS CURRENT   sodium chloride flush 0.9 % injection 5-40 mL, 2 times per day  sodium chloride flush 0.9 % injection 5-40 mL, PRN  0.9 % sodium chloride infusion, PRN  enoxaparin (LOVENOX) injection 40 mg, Daily  acetaminophen (TYLENOL) tablet 650 mg, Q4H PRN  ondansetron (ZOFRAN-ODT) disintegrating tablet 4 mg, Q8H PRN   Or  ondansetron (ZOFRAN) injection 4 mg, Q6H PRN  carvedilol (COREG) tablet 12.5 mg, BID WC  amLODIPine (NORVASC) tablet 5 mg, Daily        All medication charted and reviewed.     CONSULTS      IP CONSULT TO CARDIOLOGY    ASSESSMENT/PLAN       Merari Mcclendon is a 46 y.o. male who presents with chest pain     NSTEMI  Trop elevated at 25, stable  ECG   Heart score  Cardiology:  Elevated troponin with flat trend likely due to CASPER, recommend IVF  DC lisinopril  Start Coreg 12.5 mg p.o. twice daily and Norvasc 5 mg p.o. daily  stress test  Stress test with low risk and normal LVEF  Continue home medications and pain control  Monitor vitals, labs, and imaging  DISPO: Discharge patient with medication changes per cardiology note and follow-up with primary care    --  Maribell Bryson MD  Emergency Medicine Resident Physician     This dictation was generated by voice recognition computer software. Although all attempts are made to edit the dictation for accuracy, there may be errors in the transcription that are not intended.

## 2023-02-27 NOTE — CARE COORDINATION
Patient states that he is going to stay at a friend's house at [employer]. Patient states that his friend cannot come to pick him up and that he does not have insurance that supports transportation. Writer received permission from his supervisor to utilize [employer] under the [employer].  Transport is arranged. Reference number is [id number].

## 2023-02-27 NOTE — DISCHARGE INSTR - COC
Continuity of Care Form    Patient Name: Althea Jones   :  1970  MRN:  [id number]    Admit date:  2023  Discharge date:  ***    Code Status Order: Full Code   Advance Directives:     Admitting Physician:  Cherie Hickman MD  PCP: No primary care provider on file. Discharging Nurse: [facility] Unit/Room#: 9860/4176-88  Discharging Unit Phone Number: ***    Emergency Contact:   Extended Emergency Contact Information  Primary Emergency Contact: Sha Last [address] Phone: [phone]  Relation: Other  Secondary Emergency Contact: Livia Ha Phone: [phone]  Relation: Brother/Sister   needed? No    Past Surgical History:  Past Surgical History:   Procedure Laterality Date    BACK SURGERY         Immunization History: There is no immunization history on file for this patient.     Active Problems:  Patient Active Problem List   Diagnosis Code    Chest pain R07.9       Isolation/Infection:   Isolation            No Isolation          Patient Infection Status       Infection Onset Added Last Indicated Last Indicated By Review Planned Expiration Resolved Resolved By    None active    Resolved    COVID-19 (Rule Out) 23 COVID-19, Rapid (Ordered)   23 Rule-Out Test Resulted            Nurse Assessment:  Last Vital Signs: BP (!) 190/92   Pulse 58   Temp 97.9 °F (36.6 °C) (Oral)   Resp 18   Ht 5' 7\" (1.702 m)   Wt 125 lb 10.6 oz (57 kg)   SpO2 98%   BMI 19.68 kg/m²     Last documented pain score (0-10 scale):    Last Weight:   Wt Readings from Last 1 Encounters:   23 125 lb 10.6 oz (57 kg)     Mental Status:  {IP PT MENTAL STATUS:}    IV Access:  { CRYSTAL IV ACCESS:061037937}    Nursing Mobility/ADLs:  Walking   {CHP DME SPRB:773507147}  Transfer  {CHP DME PUSL:737699695}  Bathing  {CHP DME DLEN:150506250}  Dressing  {CHP DME VURN:357215749}  Toileting  {CHP DME NZPU:559542037}  Feeding  {CHP DME QLFV:514284002}  Med Admin  {[facility] DME KJUK:321993063}  Med Delivery   { CRYSTAL MED Delivery:048741621}    Wound Care Documentation and Therapy:        Elimination:  Continence: Bowel: {YES / DM:01261}  Bladder: {YES / PK:48331}  Urinary Catheter: {Urinary Catheter:819598204}   Colostomy/Ileostomy/Ileal Conduit: {YES / JW:70615}       Date of Last BM: ***  No intake or output data in the 24 hours ending 23 0659  No intake/output data recorded.     Safety Concerns:     [address] Safety Concerns:163853415}    Impairments/Disabilities:      [address] Impairments/Disabilities:752892060}    Nutrition Therapy:  Current Nutrition Therapy:   [address] Diet List:118751456}    Routes of Feeding: {[facility] DME Other Feedings:880114376}  Liquids: {Slp liquid thickness:98159}  Daily Fluid Restriction: {P DME Yes amt example:518503412}  Last Modified Barium Swallow with Video (Video Swallowing Test): {Done Not Done BBDA:353881556}    Treatments at the Time of Hospital Discharge:   Respiratory Treatments: ***  Oxygen Therapy:  {Therapy; copd oxygen:49147}  Ventilator:    { CC Vent PQNT:745671304}    Rehab Therapies: {THERAPEUTIC INTERVENTION:3787376766}  Weight Bearing Status/Restrictions: [employer] Weight Bearin}  Other Medical Equipment (for information only, NOT a DME order):  {EQUIPMENT:071928458}  Other Treatments: ***    Patient's personal belongings (please select all that are sent with patient):  {[facility] DME Belongings:487225740}    RN SIGNATURE:  {Esignature:197717255}    CASE MANAGEMENT/SOCIAL WORK SECTION    Inpatient Status Date: ***    Readmission Risk Assessment Score:  Readmission Risk              Risk of Unplanned Readmission:  0           Discharging to Facility/ Agency   Name:   Address:  Phone:  Fax:    Dialysis Facility (if applicable)   Name:  Address:  Dialysis Schedule:  Phone:  Fax:    / signature: {Esignature:796660436}    PHYSICIAN SECTION    Prognosis: {Prognosis:7825866279}    Condition at Discharge: Nurys8 Olga Pacheco Patient Condition:049348433}    Rehab Potential (if transferring to Rehab): {Prognosis:6472833327}    Recommended Labs or Other Treatments After Discharge: ***    Physician Certification: I certify the above information and transfer of Basilia Cheatham  is necessary for the continuing treatment of the diagnosis listed and that he requires {Admit to Appropriate Level of Care:47965} for {GREATER/LESS:133079343} 30 days.      Update Admission H&P: {CHP DME Changes in LEOBARDO:344482025}    PHYSICIAN SIGNATURE:  Electronically signed by Aaron Velez DO on 2/27/23 at 6:59 AM EST

## 2023-02-27 NOTE — DISCHARGE SUMMARY
CDU Discharge Summary        Patient:  Robert Sidhu  YOB: 1970    MRN: [id number]   Acct: [de-identified]    Primary Care Physician: No primary care provider on file. Admit date:  2/26/2023  3:12 AM  Discharge date: 2/27/2023  4:14 PM    Discharge Diagnoses:     1.)  Chest pain, improved with rest and pain meds, evaluated by cardiology, negative stress test, follow-up with primary care provider    2.)  URI, acute, improved with symptomatic treatment, follow-up with primary care provider    Follow-up:  Call today/tomorrow for a follow up appointment with No primary care provider on file. , or return to the Emergency Room with worsening symptoms    Stressed to patient the importance of following up with primary care doctor for further workup/management of symptoms. Pt verbalizes understanding and agrees with plan. Discharge Medication Changes:       Medication List        START taking these medications      amLODIPine 5 MG tablet  Commonly known as: NORVASC  Take 1 tablet by mouth daily  Start taking on: February 28, 2023     carvedilol 12.5 MG tablet  Commonly known as: COREG  Take 1 tablet by mouth 2 times daily (with meals)            STOP taking these medications      lisinopril 40 MG tablet  Commonly known as: PRINIVIL;ZESTRIL               Where to Get Your Medications        You can get these medications from any pharmacy    Bring a paper prescription for each of these medications  amLODIPine 5 MG tablet  carvedilol 12.5 MG tablet         Diet:  Diet NPO, advance as tolerated     Activity:  As tolerated    Consultants: IP CONSULT TO CARDIOLOGY    Procedures: Stress test, negative    Diagnostic Test:   Results for orders placed or performed during the hospital encounter of 02/26/23   COVID-19, Rapid    Specimen: Nasopharyngeal Swab   Result Value Ref Range    Specimen Description . NASOPHARYNGEAL SWAB     SARS-CoV-2, Rapid Not Detected Not Detected   RAPID INFLUENZA A/B ANTIGENS    Specimen: Nasopharyngeal   Result Value Ref Range    Flu A Antigen NEGATIVE NEGATIVE    Flu B Antigen NEGATIVE NEGATIVE   CBC with Auto Differential   Result Value Ref Range    WBC 5.5 3.5 - 11.3 k/uL    RBC 5.45 4.21 - 5.77 m/uL    Hemoglobin 12.3 (L) 13.0 - 17.0 g/dL    Hematocrit 39.0 (L) 40.7 - 50.3 %    MCV 71.6 (L) 82.6 - 102.9 fL    MCH 22.6 (L) 25.2 - 33.5 pg    MCHC 31.5 28.4 - 34.8 g/dL    RDW 19.1 (H) 11.8 - 14.4 %    Platelets 883 609 - 928 k/uL    MPV 9.5 8.1 - 13.5 fL    NRBC Automated 0.0 0.0 per 100 WBC    Seg Neutrophils 54 36 - 65 %    Lymphocytes 33 24 - 43 %    Monocytes 8 3 - 12 %    Eosinophils % 4 1 - 4 %    Basophils 1 0 - 2 %    Immature Granulocytes 0 0 %    Segs Absolute 2.98 1.50 - 8.10 k/uL    Absolute Lymph # 1.81 1.10 - 3.70 k/uL    Absolute Mono # 0.46 0.10 - 1.20 k/uL    Absolute Eos # 0.21 0.00 - 0.44 k/uL    Basophils Absolute 0.04 0.00 - 0.20 k/uL    Absolute Immature Granulocyte <0.03 0.00 - 0.30 k/uL    RBC Morphology ANISOCYTOSIS PRESENT    BMP   Result Value Ref Range    Glucose 75 70 - 99 mg/dL    BUN 17 6 - 20 mg/dL    Creatinine 1.84 (H) 0.70 - 1.20 mg/dL    Est, Glom Filt Rate 44 (L) >60 mL/min/1.73m2    Calcium 9.3 8.6 - 10.4 mg/dL    Sodium 142 135 - 144 mmol/L    Potassium 4.1 3.7 - 5.3 mmol/L    Chloride 105 98 - 107 mmol/L    CO2 23 20 - 31 mmol/L    Anion Gap 14 9 - 17 mmol/L   Troponin   Result Value Ref Range    Troponin, High Sensitivity 25 (H) 0 - 22 ng/L   Troponin   Result Value Ref Range    Troponin, High Sensitivity 25 (H) 0 - 22 ng/L   Brain Natriuretic Peptide   Result Value Ref Range    Pro- <300 pg/mL   Basic Metabolic Panel   Result Value Ref Range    Glucose 85 70 - 99 mg/dL    BUN 17 6 - 20 mg/dL    Creatinine 1.89 (H) 0.70 - 1.20 mg/dL    Est, Glom Filt Rate 42 (L) >60 mL/min/1.73m2    Calcium 9.0 8.6 - 10.4 mg/dL    Sodium 141 135 - 144 mmol/L    Potassium 4.1 3.7 - 5.3 mmol/L    Chloride 106 98 - 107 mmol/L    CO2 22 20 - 31 mmol/L    Anion Gap 13 9 - 17 mmol/L   Basic Metabolic Panel   Result Value Ref Range    Glucose 100 (H) 70 - 99 mg/dL    BUN 19 6 - 20 mg/dL    Creatinine 1.86 (H) 0.70 - 1.20 mg/dL    Est, Glom Filt Rate 43 (L) >60 mL/min/1.73m2    Calcium 8.8 8.6 - 10.4 mg/dL    Sodium 141 135 - 144 mmol/L    Potassium 4.5 3.7 - 5.3 mmol/L    Chloride 107 98 - 107 mmol/L    CO2 23 20 - 31 mmol/L    Anion Gap 11 9 - 17 mmol/L   Basic Metabolic Panel w/ Reflex to MG   Result Value Ref Range    Glucose 87 70 - 99 mg/dL    BUN 16 6 - 20 mg/dL    Creatinine 1.58 (H) 0.70 - 1.20 mg/dL    Est, Glom Filt Rate 52 (L) >60 mL/min/1.73m2    Calcium 9.4 8.6 - 10.4 mg/dL    Sodium 139 135 - 144 mmol/L    Potassium 3.9 3.7 - 5.3 mmol/L    Chloride 105 98 - 107 mmol/L    CO2 23 20 - 31 mmol/L    Anion Gap 11 9 - 17 mmol/L   CBC with Auto Differential   Result Value Ref Range    WBC 4.6 3.5 - 11.3 k/uL    RBC 4.99 4.21 - 5.77 m/uL    Hemoglobin 11.3 (L) 13.0 - 17.0 g/dL    Hematocrit 36.3 (L) 40.7 - 50.3 %    MCV 72.7 (L) 82.6 - 102.9 fL    MCH 22.6 (L) 25.2 - 33.5 pg    MCHC 31.1 28.4 - 34.8 g/dL    RDW 18.0 (H) 11.8 - 14.4 %    Platelets 437 086 - 642 k/uL    MPV 10.2 8.1 - 13.5 fL    NRBC Automated 0.0 0.0 per 100 WBC    Seg Neutrophils 52 36 - 65 %    Lymphocytes 28 24 - 43 %    Monocytes 12 3 - 12 %    Eosinophils % 7 (H) 1 - 4 %    Basophils 1 0 - 2 %    Immature Granulocytes 0 0 %    Segs Absolute 2.41 1.50 - 8.10 k/uL    Absolute Lymph # 1.27 1.10 - 3.70 k/uL    Absolute Mono # 0.53 0.10 - 1.20 k/uL    Absolute Eos # 0.31 0.00 - 0.44 k/uL    Basophils Absolute 0.04 0.00 - 0.20 k/uL    Absolute Immature Granulocyte <0.03 0.00 - 0.30 k/uL    RBC Morphology ANISOCYTOSIS PRESENT    EKG 12 Lead   Result Value Ref Range    Ventricular Rate 65 BPM    Atrial Rate 65 BPM    P-R Interval 142 ms    QRS Duration 78 ms    Q-T Interval 410 ms    QTc Calculation (Bazett) 426 ms    P Axis 73 degrees    R Axis 22 degrees    T Axis 43 degrees     XR CHEST PORTABLE    Result Date: 2/26/2023  EXAMINATION: ONE XRAY VIEW OF THE CHEST 2/26/2023 3:58 am COMPARISON: None. HISTORY: ORDERING SYSTEM PROVIDED HISTORY: cp TECHNOLOGIST PROVIDED HISTORY: cp Reason for Exam: upr,cough,nasal congestion,body aches FINDINGS: There is no consolidation, pleural effusion, or pneumothorax. Cardiac silhouette is not enlarged and there is no pulmonary vascular congestion. Mediastinum and radha are within normal limits. Bony thorax is unremarkable. No acute cardiopulmonary process. NM Cardiac Stress Test Nuclear Imaging    Result Date: 2/27/2023  EXAMINATION: MYOCARDIAL PERFUSION IMAGING 2/27/2023 10:46 am TECHNIQUE: For the rest study, 15.4 mCi of Tc-99m labeled sestamibi were injected. SPECT images with ECG gating were acquired. Under cardiology supervision, 0.4 mg Lexiscan was infused. After pharmacologic stress, 13.5 mCi of Tc-99m labeled sestamibi were injected. SPECT images were acquired. COMPARISON: None Available. HISTORY: ORDERING SYSTEM PROVIDED HISTORY: Chest pain TECHNOLOGIST PROVIDED HISTORY: Reason for Exam: Chest pain Procedure Type->Rx FINDINGS: Moderate severity reversible mid/basal inferior wall perfusion defect noted on the supine stress and rest images. On prone imaging the defect resolves except for small portion in the basal inferior wall. Total perfusion defect 1%. No significant wall motion abnormality. Findings indicative of mild ischemia in the basal inferior wall. No infarct. Perfusion scores are visually adjusted to account for artifact. Summed stress score:  2 Summed rest score:  0 Summed difference score: 2 Function: End diastolic volume:  81QB Left ventricular ejection fraction:  57% TID score:  1.02 (scores greater than 1.39 are considered elevated for Lexiscan stress with Tc99m) Notes concerning risk stratification: Risk stratification incorporates both clinical history and some testing results.   Final risk determination is the responsibility of the ordering provider as other patient information and test results may increase or decrease the risk assessment reported for this examination. Risk stratification criteria are adapted from \"Noninvasive Risk Stratification\" criteria from [employer]. Al, ACC/AATS/AHA/ASE/ASNC/SCAI/SCCT/STS 2017 Appropriate Use Criteria For Coronary Revascularization in Patients With Stable Ischemic Heart Disease [facility] Volume 69, Issue 17, May 2017 High risk (>3% annual death or MI) 1. Severe resting LV dysfunction (LVEF <35%) not readily explained by non coronary causes 2. Resting perfusion abnormalities greater than 10% of the myocardium in patients without prior history or evidence of MI 3. Stress-induced perfusion abnormalities encumbering greater than or equal to 10% myocardium or stress segmental scores indicating multiple vascular territories with abnormalities 4. Stress-induced LV dilatation (TID ratio greater than 1.19 for exercise and greater than 1.39 for regadenoson) Intermediate risk (1% to 3% annual death or MI) 1. Mild/moderate resting LV dysfunction (LVEF 35% to 49%) not readily explained by non coronary causes. 2. Resting perfusion abnormalities in 5%-9.9% of the myocardium in patients without a history or prior evidence of MI 3. Stress-induced perfusion abnormality encumbering 5%-9.9% of the myocardium or stress segmental scores indicating 1 vascular territory with abnormalities but without LV dilation 4. Small wall motion abnormality involving 1-2 segments and only 1 coronary bed. Low Risk (Less than 1% annual death or MI) 1. Normal or small myocardial perfusion defect at rest or with stress encumbering less than 5% of the myocardium. Persistent mild reversible basal inferior wall perfusion defect noted on supine and prone stress imaging imaging. Compatible with mild basal inferior wall stress-induced ischemia.  LVEF normal. Risk stratification: Low           Physical Exam:    General appearance - NAD, AOx 3  Lungs -CTAB, no R/R/R  Heart - RRR, no M/R/G  Abdomen - Soft, NT/ND  Neurological:  MAEx4, No focal motor deficit, sensory loss  Extremities - Cap refil <2 sec in all ext., no edema  Skin -warm, dry      Hospital Course:  Clinical course has improved, labs and imaging reviewed. Evelyn Guardado originally presented to the hospital on 2/26/2023  3:12 AM with chest pain and URI symptoms. At that time it was determined that He required further observation and cardiology evaluation. Labs and imaging were followed daily. Imaging results as above. Stress test negative. He is medically stable to be discharged. Disposition: Home    Patient stated that they will not drive themselves home from the hospital if they have gotten pain killers/ narcotics earlier that day and that they will arrange for transportation on their own or work with the  for a ride. Patient counseled NOT to drive while under the influence of narcotics/ pain killers. Condition: Good    Patient stable and ready for discharge home. I have discussed plan of care with patient and they are in understanding. They were instructed to read discharge paperwork. All of their questions and concerns were addressed. Time Spent: 0 day      --  Zohra Acuna MD  Emergency Medicine resident physician    This dictation was generated by voice recognition computer software. Although all attempts are made to edit the dictation for accuracy, there may be errors in the transcription that are not intended.

## 2023-02-27 NOTE — PROGRESS NOTES
[facility]  CDU / OBSERVATION ENCOUNTER  ATTENDING NOTE       I performed a history and physical examination of the patient and discussed management with the resident or midlevel provider. I reviewed the resident or midlevel provider's note and agree with the documented findings and plan of care. Any areas of disagreement are noted on the chart. I was personally present for the key portions of any procedures. I have documented in the chart those procedures where I was not present during the key portions. I have reviewed the nurses notes. I agree with the chief complaint, past medical history, past surgical history, allergies, medications, social and family history as documented unless otherwise noted below.    The Family history, social history, and ROS are effectively unchanged since admission unless noted elsewhere in the chart.    This patient was placed in the observation unit for reevaluation for possible admission to the hospital    The patient is a 52-year-old male who presents for evaluation of chest pain and upper respiratory congestion with shortness of breath.  Troponins were 25 and 25.  Laboratory studies revealed an acute kidney injury with creatinine 1.58.  We did not have any previous laboratory studies for comparison.  EKG shows no acute process.  Cardiology was consulted and suspects he is having a viral URI and did recommend medication changes, echo, and stress.  Repeat laboratory studies show persistent elevated creatinine and I suspect this is chronic.  He does have history of uncontrolled hypertension.  The patient was given instructions on how to manage his CKD and told to have this further evaluated by his PCP.  Will await cardiology studies.    Harriett Suárez DO, FACEP  Attending Emergency  Physician

## 2023-02-27 NOTE — DISCHARGE INSTRUCTIONS
You are hospitalized for short time for evaluation of your chest pain. Your evaluated by cardiology and underwent a stress test which was negative. You should follow-up with your primary care provider in the next few days for evaluation after a hospitalization to discuss medication management. If you do not have a primary care provider you should call the [address] to establish 1 in the area. You should take your medications as prescribed. Some of these were changed by cardiology to optimize your blood pressure management. You can treat your viral respiratory symptoms with Tylenol and Motrin as needed. Ensure to be drinking plenty of warm fluids and get plenty of rest to help your body recover. If you begin to experience worsening shortness of breath or chest pain, you should return to the emergency department for further evaluation.

## 2023-03-05 ENCOUNTER — HOSPITAL ENCOUNTER (EMERGENCY)
Age: 53
Discharge: HOME | End: 2023-03-05
Attending: EMERGENCY MEDICINE
Payer: COMMERCIAL

## 2023-03-05 VITALS
SYSTOLIC BLOOD PRESSURE: 169 MMHG | HEART RATE: 77 BPM | DIASTOLIC BLOOD PRESSURE: 111 MMHG | RESPIRATION RATE: 16 BRPM | TEMPERATURE: 97.3 F | OXYGEN SATURATION: 98 %

## 2023-03-05 DIAGNOSIS — F10.920 ACUTE ALCOHOLIC INTOXICATION WITHOUT COMPLICATION (HCC): Primary | ICD-10-CM

## 2023-03-05 LAB — GLUCOSE BLD-MCNC: 111 MG/DL (ref 75–110)

## 2023-03-05 PROCEDURE — 99282 EMERGENCY DEPT VISIT SF MDM: CPT

## 2023-03-05 PROCEDURE — 82947 ASSAY GLUCOSE BLOOD QUANT: CPT

## 2023-03-05 ASSESSMENT — ENCOUNTER SYMPTOMS
WHEEZING: 0
CHEST TIGHTNESS: 0
NAUSEA: 0
ABDOMINAL DISTENTION: 0
TROUBLE SWALLOWING: 0
SORE THROAT: 0
BACK PAIN: 0
VOMITING: 0
SHORTNESS OF BREATH: 0
ABDOMINAL PAIN: 0

## 2023-03-05 NOTE — ED PROVIDER NOTES
[facility] HANDOFF       Handoff taken on the following patient from prior Attending Physician: Dr. Yolanda Spurling  Pt Name: Gaby Renee  PCP:  No primary care provider on file. Attestation  I was available and discussed any additional care issues that arose and coordinated the management plans with the resident(s) caring for the patient during my duty period. Any areas of disagreement with resident's documentation of care or procedures are noted on the chart. I was personally present for the key portions of any/all procedures during my duty period. I have documented in the chart those procedures where I was not present during the key portions. CHIEF COMPLAINT       Chief Complaint   Patient presents with    Alcohol Intoxication     \"Drank a lot, it's my birthday\",          CURRENT MEDICATIONS     Previous Medications  Previous Medications    AMLODIPINE (NORVASC) 5 MG TABLET    Take 1 tablet by mouth daily    CARVEDILOL (COREG) 12.5 MG TABLET    Take 1 tablet by mouth 2 times daily (with meals)       Encounter Medications  No orders of the defined types were placed in this encounter. ALLERGIES     is allergic to pork-derived products. RECENT VITALS:   Temp: 97.3 °F (36.3 °C),  Heart Rate: 65, Resp: 16, BP: 121/81    RADIOLOGY:   No orders to display       LABS:  Labs Reviewed   POC GLUCOSE FINGERSTICK - Abnormal; Notable for the following components:       Result Value    POC Glucose 111 (*)     All other components within normal limits           PLAN/ TASKS OUTSTANDING     Pt presents with ETOH intoxication due to birthday. Pt has no complaints. Will reassess when sober. Plan for discharge.        (Please note that portions of this note were completed with a voice recognition program.  Efforts were made to edit the dictations but occasionally words are mis-transcribed.)    Nasreen Perez MD, MD,   Attending Emergency Physician       Nasreen Perez MD  03/05/23 3850

## 2023-03-05 NOTE — ED NOTES
Pt received cup of water per request.   Pt back on o2 monitoring, refusing BP cuff at this time.       Maico Gar RN  03/05/23 5985

## 2023-03-05 NOTE — ED NOTES
Patient resting comfortably and in no acute distress. Respirations even and nonlabored. Patient denies any needs at this time. Bed in lowest position. Call light within reach. Will continue to monitor. Patient refusing to keep any monitoring on at this time.      Emily Lovett RN  03/05/23 4782

## 2023-03-05 NOTE — ED NOTES
Report received from Bhavya Costello, [facility]. All questions answered at this time.      Otto Moody RN  03/05/23 1499

## 2023-03-05 NOTE — ED NOTES
Pt presents to the ED via Medic 13 with c/o of alcohol intoxication. Pt has no other complaints. Pt states he was recently dicharged from hospital for chest pain on 2/28. Pt states he received meds to beds for hypertension control but has not taken his medications for blood pressure. Pt is hypertensive upon arrival.   Pt states he is currently homeless and living outside. Pt states he drank [place] lot\" of beer and liquor today as it was recently his birthday. Pt states he smokes cigarettes and marijuana but does not use any other illicit drugs. Pt was ambulatory upon arrival, pt placed on pulse ox and bp monitoring. Denies chest pain, denies other c/o. Warm blankets applied to patient. Call light in reach, white board updated.        Davey Morgan RN  03/05/23 8393

## 2023-03-05 NOTE — ED NOTES
Pt laying on cot with full cardiac monitor on, eyes closed. Pt has urinal at bedside per request.   Pt denies other needs, will continue to monitor.      Kamille Burgos RN  03/05/23 2355

## 2023-03-05 NOTE — ED NOTES
Pt laying on cot with full cardiac monitor on, eyes closed. Pt arousable, speaks in full sentences. Pt denies needs at this time, will continue to monitor.      Victorina Song, ANDRAE  03/05/23 4658

## 2023-03-05 NOTE — ED PROVIDER NOTES
[address] [facility]  Emergency Department  Emergency Medicine Resident Sign-out     Care of Asif Fletcher was assumed from Dr. Radha Mitchell and is being seen for Alcohol Intoxication (\"Drank a lot, it's my birthday\", )  . The patient's initial evaluation and plan have been discussed with the prior provider who initially evaluated the patient. EMERGENCY DEPARTMENT COURSE / MEDICAL DECISION MAKING:       MEDICATIONS GIVEN:  No orders of the defined types were placed in this encounter. LABS / RADIOLOGY:     Results for orders placed or performed during the hospital encounter of 03/05/23   POC Glucose Fingerstick   Result Value Ref Range    POC Glucose 111 (H) 75 - 110 mg/dL       XR CHEST PORTABLE    Result Date: 2/26/2023  EXAMINATION: ONE XRAY VIEW OF THE CHEST 2/26/2023 3:58 am COMPARISON: None. HISTORY: ORDERING SYSTEM PROVIDED HISTORY: cp TECHNOLOGIST PROVIDED HISTORY: cp Reason for Exam: upr,cough,nasal congestion,body aches FINDINGS: There is no consolidation, pleural effusion, or pneumothorax. Cardiac silhouette is not enlarged and there is no pulmonary vascular congestion. Mediastinum and radha are within normal limits. Bony thorax is unremarkable. No acute cardiopulmonary process. NM Cardiac Stress Test Nuclear Imaging    Result Date: 2/27/2023  EXAMINATION: MYOCARDIAL PERFUSION IMAGING 2/27/2023 10:46 am TECHNIQUE: For the rest study, 15.4 mCi of Tc-99m labeled sestamibi were injected. SPECT images with ECG gating were acquired. Under cardiology supervision, 0.4 mg Lexiscan was infused. After pharmacologic stress, 13.5 mCi of Tc-99m labeled sestamibi were injected. SPECT images were acquired. COMPARISON: None Available. HISTORY: ORDERING SYSTEM PROVIDED HISTORY: Chest pain TECHNOLOGIST PROVIDED HISTORY: Reason for Exam: Chest pain Procedure Type->Rx FINDINGS: Moderate severity reversible mid/basal inferior wall perfusion defect noted on the supine stress and rest images.   On prone imaging the defect resolves except for small portion in the basal inferior wall. Total perfusion defect 1%. No significant wall motion abnormality. Findings indicative of mild ischemia in the basal inferior wall. No infarct. Perfusion scores are visually adjusted to account for artifact. Summed stress score:  2 Summed rest score:  0 Summed difference score: 2 Function: End diastolic volume:  54CF Left ventricular ejection fraction:  57% TID score:  1.02 (scores greater than 1.39 are considered elevated for Lexiscan stress with Tc99m) Notes concerning risk stratification: Risk stratification incorporates both clinical history and some testing results. Final risk determination is the responsibility of the ordering provider as other patient information and test results may increase or decrease the risk assessment reported for this examination. Risk stratification criteria are adapted from \"Noninvasive Risk Stratification\" criteria from [employer]. Al, ACC/AATS/AHA/ASE/ASNC/SCAI/SCCT/STS 2017 Appropriate Use Criteria For Coronary Revascularization in Patients With Stable Ischemic Heart Disease [facility] Volume 69, Issue 17, May 2017 High risk (>3% annual death or MI) 1. Severe resting LV dysfunction (LVEF <35%) not readily explained by non coronary causes 2. Resting perfusion abnormalities greater than 10% of the myocardium in patients without prior history or evidence of MI 3. Stress-induced perfusion abnormalities encumbering greater than or equal to 10% myocardium or stress segmental scores indicating multiple vascular territories with abnormalities 4. Stress-induced LV dilatation (TID ratio greater than 1.19 for exercise and greater than 1.39 for regadenoson) Intermediate risk (1% to 3% annual death or MI) 1. Mild/moderate resting LV dysfunction (LVEF 35% to 49%) not readily explained by non coronary causes.  2. Resting perfusion abnormalities in 5%-9.9% of the myocardium in patients without a history or prior evidence of MI 3. Stress-induced perfusion abnormality encumbering 5%-9.9% of the myocardium or stress segmental scores indicating 1 vascular territory with abnormalities but without LV dilation 4. Small wall motion abnormality involving 1-2 segments and only 1 coronary bed. Low Risk (Less than 1% annual death or MI) 1. Normal or small myocardial perfusion defect at rest or with stress encumbering less than 5% of the myocardium. Persistent mild reversible basal inferior wall perfusion defect noted on supine and prone stress imaging imaging. Compatible with mild basal inferior wall stress-induced ischemia. LVEF normal. Risk stratification: Low       RECENT VITALS:     Temp: 97.3 °F (36.3 °C),  Heart Rate: 65, Resp: 16, BP: 121/81, SpO2: 97 %    This patient is a 48 y.o. Male with alcohol intoxication. Today is his birthday and patient admits to drinking too much for his birthday and celebration. States he drank a bunch of tall boys. Does drink alcohol regularly. No documented history of delirium tremens or ethanol withdrawals. Monitor till clinically sober. No outward signs of trauma. Patient reevaluated is clinically sober at this time. Ambulating without difficulty. Will discharge. ED Course as of 03/05/23 0920   Wagner Maza Mar 05, 2023   0708 Patient care signed out to Dr. Sindi Hardy [MW]   7114 Care assumed from nighttime resident awaiting sober evaluation. When clinically sober can go home. [MS]   5940 Reevaluated. Speaking full sentences without slurred speech. Ambulating without difficulty clinically sober. Will discharge home. Patient did not drive here. [MS]      ED Course User Index  [MS] Oksana Wall DO  [MW] Afua Davis MD        OUTSTANDING TASKS / RECOMMENDATIONS:    Reevaluation sober     FINAL IMPRESSION:     1.  Acute alcoholic intoxication without complication ([facility] [address]        DISPOSITION:         DISPOSITION:  [x]  Home   []  Nursing Facility   []  Transfer -    []  Admission - FOLLOW-UP: No follow-up provider specified.    DISCHARGE MEDICATIONS: New Prescriptions    No medications on file          Rock Carolina DO  Emergency Medicine Resident  [place], [place]  Resident  03/05/23 9725

## 2023-03-05 NOTE — DISCHARGE INSTRUCTIONS
Stop drinking alcohol. Drink plenty of fluids. PLEASE RETURN TO THE EMERGENCY DEPARTMENT IMMEDIATELY for worsening symptoms, or if you develop any concerning symptoms such as: high fever not relieved by acetaminophen (Tylenol) and/or ibuprofen (Motrin), chills, shortness of breath, chest pain, persistent nausea and/or vomiting, vomiting any blood, blood in your stool, numbness, weakness or tingling in the arms or legs or change in color of the extremities, changes in mental status, persistent headache, blurry vision.

## 2023-03-05 NOTE — ED PROVIDER NOTES
[address]  ED  Emergency Department Encounter  Emergency Medicine Resident     Pt Name:Angela Santos  MRN: [de-identified]  [place] 1970  Date of evaluation: 3/5/23  PCP:  No primary care provider on file. Note Started: 2:42 AM EST      CHIEF COMPLAINT       Chief Complaint   Patient presents with    Alcohol Intoxication     \"Drank a lot, it's my birthday\",        HISTORY OF PRESENT ILLNESS  (Location/Symptom, Timing/Onset, Context/Setting, Quality, Duration, Modifying Factors, Severity.)      Shanon Rios is a 48 y.o. male who presents with alcohol intoxication. Patient states that he drink a few \"tall boys\" earlier today. Patient states that he drank too much as it was his birthday today. Patient does state that he drinks alcohol every day. Denies any headache, change in vision, nausea, vomiting, chest pain, falls or injuries. Patient denies any other drugs tonight. Patient has no documented delirium tremens or alcohol withdrawal seizures. PAST MEDICAL / SURGICAL / SOCIAL / FAMILY HISTORY      has a past medical history of Hypertension. has a past surgical history that includes back surgery.       Social History     Socioeconomic History    Marital status: Single     Spouse name: Not on file    Number of children: Not on file    Years of education: Not on file    Highest education level: Not on file   Occupational History    Not on file   Tobacco Use    Smoking status: Former    Smokeless tobacco: Never   Substance and Sexual Activity    Alcohol use: Yes     Comment: daily    Drug use: No    Sexual activity: Not on file   Other Topics Concern    Not on file   Social History Narrative    Not on file     Social Determinants of Health     Financial Resource Strain: Not on file   Food Insecurity: Not on file   Transportation Needs: Not on file   Physical Activity: Not on file   Stress: Not on file   Social Connections: Not on file   Intimate Partner Violence: Not on file   Housing Stability: Not on file       No family history on file. Allergies:  Pork-derived products    Home Medications:  Prior to Admission medications    Medication Sig Start Date End Date Taking? Authorizing Provider   carvedilol (COREG) 12.5 MG tablet Take 1 tablet by mouth 2 times daily (with meals) 2/27/23   Antonia Albarado MD   amLODIPine (NORVASC) 5 MG tablet Take 1 tablet by mouth daily 2/28/23   Antonia Albarado MD         REVIEW OF SYSTEMS       Review of Systems   Constitutional:  Negative for activity change, appetite change, chills, fatigue and fever. HENT:  Negative for congestion, sore throat and trouble swallowing. Respiratory:  Negative for chest tightness, shortness of breath and wheezing. Cardiovascular:  Negative for chest pain. Gastrointestinal:  Negative for abdominal distention, abdominal pain, nausea and vomiting. Genitourinary:  Negative for dysuria. Musculoskeletal:  Negative for back pain and joint swelling. Neurological:  Negative for weakness, numbness and headaches. PHYSICAL EXAM      INITIAL VITALS:   /81   Pulse 65   Temp 97.3 °F (36.3 °C) (Oral)   Resp 16   SpO2 97%     Physical Exam  Constitutional:       General: He is not in acute distress. Appearance: Normal appearance. He is not ill-appearing or toxic-appearing. HENT:      Head: Normocephalic and atraumatic. Eyes:      Extraocular Movements: Extraocular movements intact. Pupils: Pupils are equal, round, and reactive to light. Cardiovascular:      Rate and Rhythm: Normal rate and regular rhythm. Pulmonary:      Effort: Pulmonary effort is normal.      Breath sounds: Normal breath sounds. Abdominal:      General: Abdomen is flat. There is no distension. Palpations: Abdomen is soft. Tenderness: There is no abdominal tenderness. Neurological:      General: No focal deficit present.          DDX/DIAGNOSTIC RESULTS / EMERGENCY DEPARTMENT COURSE / MDM     Medical Decision Making  80-year-old male presents emergency department with alcohol intoxication. Patient states he was drinking alcohol earlier today as it was his birthday. Patient does state that he drinks alcohol on a daily basis. Patient denies any other drugs. Patient denies any fall, trauma, any injuries. In the emergency department the patient is afebrile, nontoxic in appearance. Patient is clinically intoxicated. As the patient is clinically intoxicated will wait and reassess when the patient is clinically sober. EKG      All EKG's are interpreted by the Emergency Department Physician who either signs or Co-signs this chart in the absence of a cardiologist.    EMERGENCY DEPARTMENT COURSE:      ED Course as of 03/05/23 0709   Jaclyn Talley Mar 05, 2023   0708 Patient care signed out to Dr. Deangelo Fernandez [MW]      ED Course User Index  [MW] Janelle Yen MD       PROCEDURES:      CONSULTS:  None    CRITICAL CARE:  There was significant risk of life threatening deterioration of patient's condition requiring my direct management. Critical care time 0 minutes, excluding any documented procedures. FINAL IMPRESSION      1. Acute alcoholic intoxication without complication ([address]          DISPOSITION / PLAN     DISPOSITION        PATIENT REFERRED TO:  No follow-up provider specified.     DISCHARGE MEDICATIONS:  New Prescriptions    No medications on file       Janelle Yen MD  Emergency Medicine Resident    (Please note that portions of thisnote were completed with a voice recognition program.  Efforts were made to edit the dictations but occasionally words are mis-transcribed.)       Janelle Yen MD  Resident  03/05/23 9404

## 2023-03-05 NOTE — ED PROVIDER NOTES
[facility]   [facility]  Faculty Attestation       I performed a history and physical examination of the patient and discussed management with the resident. I reviewed the residents note and agree with the documented findings including all diagnostic interpretations and plan of care. Any areas of disagreement are noted on the chart. I was personally present for the key portions of any procedures. I have documented in the chart those procedures where I was not present during the key portions. I have reviewed the emergency nurses triage note. I agree with the chief complaint, past medical history, past surgical history, allergies, medications, social and family history as documented unless otherwise noted below. Documentation of the HPI, Physical Exam and Medical Decision Making performed by scriblopez is based on my personal performance of the HPI, PE and MDM. For Physician Assistant/ Nurse Practitioner cases/documentation I have personally evaluated this patient and have completed at least one if not all key elements of the E/M (history, physical exam, and MDM). Additional findings are as noted. Pertinent Comments     Primary Care Physician: No primary care provider on file. ED Triage Vitals   BP Temp Temp Source Heart Rate Resp SpO2 Height Weight   03/05/23 0244 03/05/23 0256 03/05/23 0256 03/05/23 0244 03/05/23 0244 03/05/23 0244 -- --   (!) 197/109 97.3 °F (36.3 °C) Oral 78 16 99 %          This is a 48 y.o. male who presents to the Emergency Department alcohol intoxication. No other complaints. No signs of outward trauma on exam and admits to drinking a large amount of alcohol. Moving all extremities.   Protecting airway    Medical Decision Making  Alcohol intoxication  Monitor until clinically sober  Patient care signed out to oncoming physician    Problems Addressed:  Acute alcoholic intoxication without complication ([facility] [address]): acute illness or injury    Amount and/or Complexity of Data Reviewed  Independent Historian: EMS         Critical Care: None     James Alamo MD  Attending Emergency Physician         James Alamo MD  03/05/23 2422

## 2023-03-05 NOTE — ED NOTES
Patient resting comfortably and in no acute distress. Respirations even and nonlabored. Patient denies any needs at this time. Bed in lowest position. Call light within reach. Will continue to monitor.      Emily Lovett RN  03/05/23 8548

## 2023-08-04 ENCOUNTER — HOSPITAL ENCOUNTER (EMERGENCY)
Age: 53
Discharge: HOME | End: 2023-08-04
Attending: EMERGENCY MEDICINE

## 2023-08-04 VITALS
WEIGHT: 128.09 LBS | OXYGEN SATURATION: 99 % | TEMPERATURE: 98.7 F | BODY MASS INDEX: 20.1 KG/M2 | HEIGHT: 67 IN | HEART RATE: 70 BPM | RESPIRATION RATE: 16 BRPM | DIASTOLIC BLOOD PRESSURE: 112 MMHG | SYSTOLIC BLOOD PRESSURE: 178 MMHG

## 2023-08-04 DIAGNOSIS — I10 PRIMARY HYPERTENSION: Primary | ICD-10-CM

## 2023-08-04 LAB
TROPONIN I SERPL HS-MCNC: 19 NG/L (ref 0–22)
TROPONIN I SERPL HS-MCNC: 20 NG/L (ref 0–22)

## 2023-08-04 PROCEDURE — 93005 ELECTROCARDIOGRAM TRACING: CPT | Performed by: EMERGENCY MEDICINE

## 2023-08-04 PROCEDURE — 84484 ASSAY OF TROPONIN QUANT: CPT

## 2023-08-04 PROCEDURE — 99284 EMERGENCY DEPT VISIT MOD MDM: CPT

## 2023-08-04 PROCEDURE — 6370000000 HC RX 637 (ALT 250 FOR IP)

## 2023-08-04 PROCEDURE — 93005 ELECTROCARDIOGRAM TRACING: CPT

## 2023-08-04 RX ORDER — AMLODIPINE BESYLATE 10 MG/1
5 TABLET ORAL ONCE
Status: COMPLETED | OUTPATIENT
Start: 2023-08-04 | End: 2023-08-04

## 2023-08-04 RX ORDER — CARVEDILOL 12.5 MG/1
12.5 TABLET ORAL 2 TIMES DAILY WITH MEALS
Qty: 60 TABLET | Refills: 0 | Status: SHIPPED | OUTPATIENT
Start: 2023-08-04

## 2023-08-04 RX ORDER — AMLODIPINE BESYLATE 5 MG/1
5 TABLET ORAL DAILY
Qty: 30 TABLET | Refills: 0 | Status: SHIPPED | OUTPATIENT
Start: 2023-08-04

## 2023-08-04 RX ORDER — CARVEDILOL 12.5 MG/1
12.5 TABLET ORAL ONCE
Status: COMPLETED | OUTPATIENT
Start: 2023-08-04 | End: 2023-08-04

## 2023-08-04 RX ADMIN — CARVEDILOL 12.5 MG: 12.5 TABLET, FILM COATED ORAL at 21:52

## 2023-08-04 RX ADMIN — AMLODIPINE BESYLATE 5 MG: 10 TABLET ORAL at 21:52

## 2023-08-04 RX ADMIN — CARVEDILOL 12.5 MG: 12.5 TABLET, FILM COATED ORAL at 23:06

## 2023-08-04 RX ADMIN — AMLODIPINE BESYLATE 5 MG: 10 TABLET ORAL at 23:06

## 2023-08-04 ASSESSMENT — ENCOUNTER SYMPTOMS
VOMITING: 0
NAUSEA: 0
SHORTNESS OF BREATH: 0
COUGH: 0
ABDOMINAL PAIN: 0
SORE THROAT: 0

## 2023-08-04 ASSESSMENT — PAIN - FUNCTIONAL ASSESSMENT: PAIN_FUNCTIONAL_ASSESSMENT: NONE - DENIES PAIN

## 2023-08-05 NOTE — ED PROVIDER NOTES
[address] ED  Emergency Department Encounter  Emergency Medicine Resident     Pt Name:Angela Navarro  MRN: [de-identified]  [id number] [facility] [place] 1970  Date of evaluation: 8/4/23  PCP:  No primary care provider on file. Note Started: 9:28 PM EDT      CHIEF COMPLAINT       Chief Complaint   Patient presents with    Hypertension    Tingling     Bilateral hands       HISTORY OF PRESENT ILLNESS  (Location/Symptom, Timing/Onset, Context/Setting, Quality, Duration, Modifying Factors, Severity.)      Xavier Nelson is a 48 y.o. male with history of hypertension who presents with elevated blood pressure and tingling in his bilateral arms. Patient had his blood pressure checked at the [address] [place] [address] this morning and it was noted to be in the 190s. Patient states he has been out of his Norvasc and Coreg for the last week and a half. He has prescriptions at the pharmacy but he has not picked them up because he is unable to afford them at this time. Patient states he did have a headache and some blurry vision this morning which has resolved. He has been having bilateral arm tingling that started today. He denies any chest pain, nausea, vomiting, or shortness of breath. No fevers or chills. No upper respiratory symptoms. PAST MEDICAL / SURGICAL / SOCIAL / FAMILY HISTORY      has a past medical history of Hypertension. has a past surgical history that includes back surgery.       Social History     Socioeconomic History    Marital status: Single     Spouse name: Not on file    Number of children: Not on file    Years of education: Not on file    Highest education level: Not on file   Occupational History    Not on file   Tobacco Use    Smoking status: Former    Smokeless tobacco: Never   Substance and Sexual Activity    Alcohol use: Yes     Comment: daily    Drug use: No    Sexual activity: Not on file   Other Topics Concern    Not on file   Social History Narrative    Not on file     Social Determinants of Health heart sounds. Pulmonary:      Effort: Pulmonary effort is normal. No respiratory distress. Breath sounds: No stridor. No wheezing, rhonchi or rales. Abdominal:      General: Abdomen is flat. There is no distension. Palpations: Abdomen is soft. Tenderness: There is no abdominal tenderness. There is no guarding or rebound. Musculoskeletal:         General: No swelling or tenderness. Normal range of motion. Cervical back: Normal range of motion. Skin:     Coloration: Skin is not jaundiced. Findings: No bruising, erythema, lesion or rash. Neurological:      General: No focal deficit present. Mental Status: He is oriented to person, place, and time. Cranial Nerves: No cranial nerve deficit. Sensory: No sensory deficit. Motor: No weakness. Coordination: Coordination normal.      Gait: Gait normal.         DDX/DIAGNOSTIC RESULTS / EMERGENCY DEPARTMENT COURSE / MDM     Medical Decision Making  60-year-old male with history of hypertension who presents with elevated blood pressure and tingling in his bilateral arms that started today. Patient went to the [address] [place] [address] to have his blood pressure checked. His blood pressure was in the 190s. Patient states he did have a headache and some blurry vision this morning that has resolved. Patient started having bilateral arm tingling today. He denies any chest pain, shortness of breath, nausea, or vomiting. No upper respiratory symptoms. No fevers or chills. Patient has been out of his Coreg and Norvasc for the last week and a half. He states the prescription is at the pharmacy but he has been unable to pick them up as he cannot afford it. Patient is nontoxic-appearing. Hypertensive with systolic in the low 582G on arrival.  Afebrile. Not hypoxic. Not tachycardic. Heart sounds normal.  Breath sounds clear to auscultation bilaterally. No respiratory distress. Abdomen soft, nontender, nondistended.   No rebound or

## 2023-08-05 NOTE — ED NOTES
Pt arrived to the ER via triage with C/o hypertension and bilateral hand tingling. Pt states he has a hx of hypertension, but has been out of his medications for some time. Pt states bp pta was approx 194/123. Pt denies SOB or chest pain, but states that the tingling in his hands has occurred before. Pt denies any other medical hx or medication use. Pt denies recreational drug use. Cardiac monitor placed, EKG completed, IV established. Call light in reach, white board updated. Waiting further orders to plan of care.      [facility], RN  08/04/23 4035

## 2023-08-05 NOTE — DISCHARGE INSTRUCTIONS
You were seen in the emergency department for elevated blood pressure and tingling in your bilateral arms. EKG showed no acute changes. We gave you your antihypertensive medications in the emergency department with improvement in your blood pressure and your symptoms. I did send your prescriptions for Coreg and Norvasc to the pharmacy here at [address] [facility] faxed a form to see if those medications can be given to you at a lower price. Please follow-up on Monday to see if you can get your medications. Please follow-up with your primary care provider. If you do not have one, I have provided the contact information for [facility]. Please call to schedule an appointment. Please return to the emergency department if you develop any chest pain, shortness of breath, nausea, vomiting, headache, blurry vision, or numbness/weakness in your arms or legs.

## 2023-08-05 NOTE — ED NOTES
SW asked by Dr. Diego Lainez to fax [facility] to pharmacy. Pt informed that he may not qualify for program and was provided with contact information for [facility]. Pt provided with [employer] Voucher due to not having insurance. Pt going to address on file.       MARLYN Cabrera  08/05/23 8293

## 2023-08-05 NOTE — ED NOTES
The following labs were labeled with appropriate pt sticker and tubed to lab:     [x] Blue     [x] Lavender   [] on ice  [x] Green/yellow  [] Green/black [] on ice  [] Viridiana Pickering  [] on ice  [] Yellow  [] Red  [] Type/ Screen  [] ABG  [] VBG    [] COVID-19 swab    [] Rapid  [] PCR  [] Flu swab  [] Peds Viral Panel     [] Urine Sample  [] Fecal Sample  [] Pelvic Cultures  [] Blood Cultures  [] X 2  [] STREP Cultures      Kelley Roxanne Weston RN  08/04/23 6452

## 2023-08-06 LAB
EKG ATRIAL RATE: 73 BPM
EKG P AXIS: 71 DEGREES
EKG P-R INTERVAL: 142 MS
EKG Q-T INTERVAL: 396 MS
EKG QRS DURATION: 76 MS
EKG QTC CALCULATION (BAZETT): 436 MS
EKG R AXIS: 19 DEGREES
EKG T AXIS: 41 DEGREES
EKG VENTRICULAR RATE: 73 BPM

## 2023-08-07 LAB
EKG ATRIAL RATE: 73 BPM
EKG ATRIAL RATE: 81 BPM
EKG P AXIS: 69 DEGREES
EKG P AXIS: 71 DEGREES
EKG P-R INTERVAL: 134 MS
EKG P-R INTERVAL: 142 MS
EKG Q-T INTERVAL: 364 MS
EKG Q-T INTERVAL: 396 MS
EKG QRS DURATION: 76 MS
EKG QRS DURATION: 80 MS
EKG QTC CALCULATION (BAZETT): 422 MS
EKG QTC CALCULATION (BAZETT): 436 MS
EKG R AXIS: 19 DEGREES
EKG R AXIS: 24 DEGREES
EKG T AXIS: 41 DEGREES
EKG T AXIS: 50 DEGREES
EKG VENTRICULAR RATE: 73 BPM
EKG VENTRICULAR RATE: 81 BPM

## 2023-08-07 PROCEDURE — 93010 ELECTROCARDIOGRAM REPORT: CPT | Performed by: INTERNAL MEDICINE

## 2023-12-11 ENCOUNTER — TELEPHONE (OUTPATIENT)
Dept: UROLOGY | Age: 53
End: 2023-12-11

## 2023-12-11 NOTE — TELEPHONE ENCOUNTER
[facility] attempted to contact pt to schedule an appointment for ED with Dr. Urszula Wynn but is unable to contact pt with the numbers given. Referral sent back to Francoise Murray NP who sent the referral to us.

## 2023-12-21 ENCOUNTER — HOSPITAL ENCOUNTER (EMERGENCY)
Age: 53
Discharge: HOME | End: 2023-12-21
Attending: EMERGENCY MEDICINE
Payer: COMMERCIAL

## 2023-12-21 VITALS
HEART RATE: 83 BPM | SYSTOLIC BLOOD PRESSURE: 146 MMHG | DIASTOLIC BLOOD PRESSURE: 88 MMHG | TEMPERATURE: 98.2 F | OXYGEN SATURATION: 99 % | RESPIRATION RATE: 18 BRPM

## 2023-12-21 DIAGNOSIS — Z20.822 ENCOUNTER FOR LABORATORY TESTING FOR COVID-19 VIRUS: Primary | ICD-10-CM

## 2023-12-21 LAB
SARS-COV-2 RDRP RESP QL NAA+PROBE: NOT DETECTED
SPECIMEN DESCRIPTION: NORMAL

## 2023-12-21 PROCEDURE — 87635 SARS-COV-2 COVID-19 AMP PRB: CPT

## 2023-12-21 PROCEDURE — 99283 EMERGENCY DEPT VISIT LOW MDM: CPT

## 2023-12-21 NOTE — DISCHARGE INSTRUCTIONS
You have been seen in the ER today for COVID testing. COVID test was negative. If you begin to experience any symptoms such as chest pain shortness of breath nausea vomiting dizziness drowsiness abdominal pain loss of consciousness or any other symptoms you find concerning please return to the ED for follow-up evaluation. If you have been given pain medication please take them only as prescribed. Do not take more medication than prescribed at any given time. Please follow-up with your primary care provider within 3-5 days for continued care, sooner if you have concerns.

## 2023-12-21 NOTE — ED NOTES
Pt presented to ED via triage for covid testing for shelter admission to [facility]. Pt denies any medical complaints. Pt denies any suicidal ideation.

## 2023-12-21 NOTE — ED PROVIDER NOTES
[address] ED  Emergency Department Encounter  Emergency Medicine Resident     Pt Name:Angela Adler  MRN: [[id number]]  [id number] [place] 1970  Date of evaluation: 12/21/23  PCP:  No primary care provider on file. Note Started: 4:54 PM EST      CHIEF COMPLAINT       Chief Complaint   Patient presents with    Covid Testing     Need covid test for shelter        HISTORY OF PRESENT ILLNESS  (Location/Symptom, Timing/Onset, Context/Setting, Quality, Duration, Modifying Factors, Severity.)      Rishabh Lawrence is a 48 y.o. male who presents requesting COVID test.  Patient states that he needs a COVID test to get into a shelter. He is not complaining of any symptoms today. He has a history of hypertension, currently take medications for it. No fevers chills nausea vomiting dizziness drowsiness chest pain shortness of breath or other constitutional symptoms. PAST MEDICAL / SURGICAL / SOCIAL / FAMILY HISTORY      has a past medical history of Hypertension. has a past surgical history that includes back surgery. Social History     Socioeconomic History    Marital status: Single     Spouse name: Not on file    Number of children: Not on file    Years of education: Not on file    Highest education level: Not on file   Occupational History    Not on file   Tobacco Use    Smoking status: Former    Smokeless tobacco: Never   Substance and Sexual Activity    Alcohol use: Yes     Comment: daily    Drug use: No    Sexual activity: Not on file   Other Topics Concern    Not on file   Social History Narrative    Not on file     Social Determinants of Health     Financial Resource Strain: Not on file   Food Insecurity: Not on file   Transportation Needs: Not on file   Physical Activity: Not on file   Stress: Not on file   Social Connections: Not on file   Intimate Partner Violence: Not on file   Housing Stability: Not on file       History reviewed. No pertinent family history.     Allergies:  Pork-derived

## 2024-01-07 ENCOUNTER — HOSPITAL ENCOUNTER (EMERGENCY)
Age: 54
Discharge: HOME | End: 2024-01-07
Attending: EMERGENCY MEDICINE
Payer: COMMERCIAL

## 2024-01-07 VITALS
BODY MASS INDEX: 20.06 KG/M2 | SYSTOLIC BLOOD PRESSURE: 142 MMHG | DIASTOLIC BLOOD PRESSURE: 81 MMHG | HEIGHT: 67 IN | TEMPERATURE: 98.2 F | RESPIRATION RATE: 16 BRPM | OXYGEN SATURATION: 99 % | HEART RATE: 77 BPM

## 2024-01-07 DIAGNOSIS — J06.9 VIRAL UPPER RESPIRATORY TRACT INFECTION: ICD-10-CM

## 2024-01-07 DIAGNOSIS — Z20.822 LAB TEST NEGATIVE FOR COVID-19 VIRUS: Primary | ICD-10-CM

## 2024-01-07 LAB
SARS-COV-2 RDRP RESP QL NAA+PROBE: NOT DETECTED
SPECIMEN DESCRIPTION: NORMAL

## 2024-01-07 PROCEDURE — 87635 SARS-COV-2 COVID-19 AMP PRB: CPT

## 2024-01-07 PROCEDURE — 99283 EMERGENCY DEPT VISIT LOW MDM: CPT

## 2024-01-07 ASSESSMENT — ENCOUNTER SYMPTOMS
COUGH: 1
VOMITING: 0
SHORTNESS OF BREATH: 0
ABDOMINAL PAIN: 0
NAUSEA: 0
WHEEZING: 0

## 2024-01-07 ASSESSMENT — PAIN - FUNCTIONAL ASSESSMENT: PAIN_FUNCTIONAL_ASSESSMENT: NONE - DENIES PAIN

## 2024-01-07 NOTE — DISCHARGE INSTRUCTIONS
You were seen in the emergency department for screening for COVID-19.  COVID-19 test was negative.  You do have a cough, this is likely another upper respiratory tract infection from a virus [\"cold\"].    Return to the emergency department if you have any worsening chest pain, shortness of breath, bloody cough, or any other acute concern.

## 2024-01-07 NOTE — ED PROVIDER NOTES
[facility]     Emergency Department     Faculty Attestation    I performed a history and physical examination of the patient and discussed management with the resident. I reviewed the resident´s note and agree with the documented findings and plan of care. Any areas of disagreement are noted on the chart. I was personally present for the key portions of any procedures. I have documented in the chart those procedures where I was not present during the key portions. I have reviewed the emergency nurses triage note. I agree with the chief complaint, past medical history, past surgical history, allergies, medications, social and family history as documented unless otherwise noted below. For Physician Assistant/ Nurse Practitioner cases/documentation I have personally evaluated this patient and have completed at least one if not all key elements of the E/M (history, physical exam, and MDM). Additional findings are as noted.    Chest clear, heart exam normal, no respiratory distress.     Sarath Marsh MD  01/07/24 7480

## 2024-01-07 NOTE — PROGRESS NOTES
I did not see, evaluate, or participate in the care of this patient.  I signed up for this patient in error.     Akosua Rubalcava DO  Emergency Medicine Resident   01/07/24 4:48 PM

## 2024-01-07 NOTE — ED PROVIDER NOTES
[facility] ED  Emergency Department Encounter  Emergency Medicine Resident     Pt Name:Angela Ghotra  MRN: [id number]  Birthdate 1970  Date of evaluation: 1/7/24  PCP:  No primary care provider on file.  Note Started: 4:52 PM EST      CHIEF COMPLAINT       Chief Complaint   Patient presents with    Covid Testing    Cough       HISTORY OF PRESENT ILLNESS  (Location/Symptom, Timing/Onset, Context/Setting, Quality, Duration, Modifying Factors, Severity.)      Angela Ghotra is a 53 y.o. male who presents with request to be COVID tested.  Patient is staying at [facility], states that he needs a weekly negative COVID test to stay there.  Patient is denying any fever or chills, does endorse a productive cough for the past 5 days but denies any hemoptysis, denies chest pain, denies shortness of breath.  He denies any trauma to the chest.  Denies any known COVID contacts.    Patient states that he does not require an influenza swab.    Patient denies history of DVT/PE, is on antihypertensives but denies other medications.    PAST MEDICAL / SURGICAL / SOCIAL / FAMILY HISTORY      has a past medical history of Hypertension.     has a past surgical history that includes back surgery.    Social History     Socioeconomic History    Marital status: Single     Spouse name: Not on file    Number of children: Not on file    Years of education: Not on file    Highest education level: Not on file   Occupational History    Not on file   Tobacco Use    Smoking status: Former    Smokeless tobacco: Never   Substance and Sexual Activity    Alcohol use: Yes     Comment: daily    Drug use: No    Sexual activity: Not on file   Other Topics Concern    Not on file   Social History Narrative    Not on file     Social Determinants of Health     Financial Resource Strain: Not on file   Food Insecurity: Not on file   Transportation Needs: Not on file   Physical Activity: Not on file   Stress: Not on file   Social

## 2024-01-07 NOTE — ED TRIAGE NOTES
Patient presented to the ED today with complaints of a cough. Patient states symptoms presented 5 days ago. Patient would also like to be COVID tested to get into the homeless shelter.

## 2024-01-15 ENCOUNTER — OFFICE VISIT (OUTPATIENT)
Dept: UROLOGY | Age: 54
End: 2024-01-15
Payer: COMMERCIAL

## 2024-01-15 VITALS
HEART RATE: 72 BPM | WEIGHT: 132 LBS | DIASTOLIC BLOOD PRESSURE: 70 MMHG | BODY MASS INDEX: 20.72 KG/M2 | TEMPERATURE: 98.2 F | HEIGHT: 67 IN | OXYGEN SATURATION: 98 % | SYSTOLIC BLOOD PRESSURE: 124 MMHG

## 2024-01-15 DIAGNOSIS — Z12.5 PROSTATE CANCER SCREENING: ICD-10-CM

## 2024-01-15 DIAGNOSIS — N52.9 ERECTILE DYSFUNCTION, UNSPECIFIED ERECTILE DYSFUNCTION TYPE: Primary | ICD-10-CM

## 2024-01-15 PROCEDURE — 99204 OFFICE O/P NEW MOD 45 MIN: CPT | Performed by: UROLOGY

## 2024-01-15 RX ORDER — NAPROXEN SODIUM 550 MG/1
550 TABLET ORAL 2 TIMES DAILY WITH MEALS
COMMUNITY
Start: 2020-09-20

## 2024-01-15 RX ORDER — ACETAMINOPHEN 160 MG
TABLET,DISINTEGRATING ORAL
COMMUNITY
Start: 2023-11-02

## 2024-01-15 RX ORDER — TADALAFIL 5 MG/1
5 TABLET ORAL DAILY
Qty: 90 TABLET | Refills: 1 | Status: SHIPPED | OUTPATIENT
Start: 2024-01-15

## 2024-01-15 RX ORDER — ROSUVASTATIN CALCIUM 5 MG/1
TABLET, COATED ORAL
COMMUNITY
Start: 2023-11-02

## 2024-01-15 RX ORDER — TADALAFIL 5 MG/1
5 TABLET ORAL DAILY
Qty: 90 TABLET | Refills: 1 | Status: SHIPPED | OUTPATIENT
Start: 2024-01-15 | End: 2024-01-15

## 2024-01-15 RX ORDER — ERGOCALCIFEROL 1.25 MG/1
CAPSULE ORAL
COMMUNITY
Start: 2023-11-02

## 2024-01-15 ASSESSMENT — ENCOUNTER SYMPTOMS
NAUSEA: 0
EYE PAIN: 0
COUGH: 1
VOMITING: 0
WHEEZING: 0
SHORTNESS OF BREATH: 0
BACK PAIN: 0
COLOR CHANGE: 0
GASTROINTESTINAL NEGATIVE: 1
EYES NEGATIVE: 1
EYE REDNESS: 0
ABDOMINAL PAIN: 0

## 2024-01-15 NOTE — PROGRESS NOTES
[facility]  [address]  [facility] [address]  Dept: [phone]  Dept Fax: [phone]    [facility] Urology Office Note - New patient    Patient:  Angela Ghotra  YOB: 1970  Date: 1/15/2024    The patient is a 53 y.o. male who presents todayfor evaluation of the following problems:   Chief Complaint   Patient presents with    ER Follow up    Urinary Frequency    referred by No primary care provider on file..      HPI  This is a very pleasant 53-year-old gentleman with a history of erectile dysfunction.  His erectile dysfunction is worsened over the last 9 to 12 months.  He has never tried any medications.  His libido is good.  His last PSA was checked about 18 months ago and it was normal.  He has no family history of prostate cancer and no bothersome urinary symptoms.    (Patient's old records have been requested, reviewed and summarized in today's note.)    Summary of old records: N/A    Additional History: N/A    Procedures Today: N/A    Last several PSA's:  No results found for: \"PSA\"  Last total testosterone:  No results found for: \"TESTOSTERONE\"  Urinalysis today:  No results found for this visit on 01/15/24.    AUA Symptom Score (1/15/2024):  INCOMPLETE EMPTYING: How often have you had the sensation of not emptying your bladder?: Not at all  FREQUENCY: How often do you have to urinate less than every two hours?: Almost always  INTERMITTENCY: How often have you found you stopped and started again several times when you urinated?: Not at all  URGENCY: How often have you found it difficult to postpone urination?: Less than Half the time  WEAK STREAM: How often have you had a weak urinary stream?: Not at all  STRAINING: How often have you had to strain to start  urination?: Not at all  NOCTURIA: How many times did you typically get up at night to uriniate?: 4 Times  TOTAL I-PSS SCORE:: 11       Last BUN and

## 2024-01-15 NOTE — PROGRESS NOTES
Review of Systems   Constitutional: Negative.  Negative for appetite change, chills and fever.   Eyes: Negative.  Negative for pain, redness and visual disturbance.   Respiratory:  Positive for cough. Negative for shortness of breath and wheezing.    Cardiovascular: Negative.  Negative for chest pain and leg swelling.   Gastrointestinal: Negative.  Negative for abdominal pain, nausea and vomiting.   Endocrine: Negative.    Genitourinary:  Positive for frequency. Negative for difficulty urinating, dysuria, flank pain, hematuria, testicular pain and urgency.   Musculoskeletal: Negative.  Negative for back pain, joint swelling and myalgias.   Skin: Negative.  Negative for color change, rash and wound.   Neurological: Negative.  Negative for dizziness, tremors, weakness, numbness and headaches.   Hematological: Negative.  Negative for adenopathy. Does not bruise/bleed easily.   Psychiatric/Behavioral: Negative.

## 2024-02-11 ENCOUNTER — HOSPITAL ENCOUNTER (EMERGENCY)
Age: 54
Discharge: HOME | End: 2024-02-11
Attending: EMERGENCY MEDICINE
Payer: COMMERCIAL

## 2024-02-11 ENCOUNTER — HOSPITAL ENCOUNTER (EMERGENCY)
Age: 54
Discharge: HOME | End: 2024-02-12
Attending: EMERGENCY MEDICINE
Payer: COMMERCIAL

## 2024-02-11 VITALS
BODY MASS INDEX: 22.71 KG/M2 | RESPIRATION RATE: 15 BRPM | WEIGHT: 145 LBS | DIASTOLIC BLOOD PRESSURE: 94 MMHG | OXYGEN SATURATION: 98 % | HEART RATE: 102 BPM | SYSTOLIC BLOOD PRESSURE: 159 MMHG | TEMPERATURE: 98.8 F

## 2024-02-11 DIAGNOSIS — Z11.52 ENCOUNTER FOR SCREENING FOR COVID-19: Primary | ICD-10-CM

## 2024-02-11 DIAGNOSIS — R06.02 SHORTNESS OF BREATH: Primary | ICD-10-CM

## 2024-02-11 LAB
SARS-COV-2 RDRP RESP QL NAA+PROBE: NOT DETECTED
SPECIMEN DESCRIPTION: NORMAL

## 2024-02-11 PROCEDURE — 87635 SARS-COV-2 COVID-19 AMP PRB: CPT

## 2024-02-11 PROCEDURE — 99283 EMERGENCY DEPT VISIT LOW MDM: CPT | Performed by: EMERGENCY MEDICINE

## 2024-02-11 NOTE — ED NOTES
HEAD TO TOE WDL- PT DENIES ANY PHYSICAL COMPLAINTS AND STATES HE FEELS GOOD, ONLY NEEDS PROOF OF NEGATIVE COVID TEST FOR HOMELESS SHELTER.    Pt calm, nondistressed, watching tv awaiting results from Covid

## 2024-02-11 NOTE — ED PROVIDER NOTES
[facility] ED     Emergency Department     Faculty Attestation        I performed a history and physical examination of the patient and discussed management with the resident. I reviewed the resident’s note and agree with the documented findings and plan of care. Any areas of disagreement are noted on the chart. I was personally present for the key portions of any procedures. I have documented in the chart those procedures where I was not present during the key portions. I have reviewed the emergency nurses triage note. I agree with the chief complaint, past medical history, past surgical history, allergies, medications, social and family history as documented unless otherwise noted below.  For Physician Assistant/ Nurse Practitioner cases/documentation I have personally evaluated this patient and have completed at least one if not all key elements of the E/M (history, physical exam, and MDM). Additional findings are as noted.      PCP:  No primary care provider on file.  Note Started: 2/11/24, 2:19 PM EST    Pertinent Comments:     Patient is a 53-year-old male here and is homeless waiting to get in the shelter.   Denies any symptoms but apparently they are insistent upon a COVID swab.   Will obtain COVID swab    Critical Care  None      (Please note that portions of this note were completed with a voice recognition program. Efforts were made to edit the dictations but occasionally words are mis-transcribed. Whenever words are used in this note in any gender, they shall be construed as though they were used in the gender appropriate to the circumstances; and whenever words are used in this note in the singular or plural form, they shall be construed as though they were used in the form appropriate to the circumstances.)    Wilman Shankar MD [facility]  Attending Emergency Medicine Physician            Wilman Shankar MD  02/11/24 5601

## 2024-02-11 NOTE — ED PROVIDER NOTES
[facility] ED  Emergency Department Encounter  Emergency Medicine Resident     Pt Name:Angela Ghotra  MRN: [id number]  Birthdate 1970  Date of evaluation: 2/11/24  PCP:  No primary care provider on file.  Note Started: 2:16 PM EST      CHIEF COMPLAINT       Chief Complaint   Patient presents with    Covid Testing     Needed for homeless shelter         HISTORY OF PRESENT ILLNESS  (Location/Symptom, Timing/Onset, Context/Setting, Quality, Duration, Modifying Factors, Severity.)      Angela Ghotra is a 53 y.o. male who presents requesting a COVID test.  Patient needs a negative test in order to go to the shelter.  He does not have any symptoms, no fever, sore throat, runny nose, headache, shortness of breath, cough, chest pain. He has a history of HTN but reports he didn't take his meds this morning.     PAST MEDICAL / SURGICAL / SOCIAL / FAMILY HISTORY      has a past medical history of Hypertension.     has a past surgical history that includes back surgery.    Social History     Socioeconomic History    Marital status: Single     Spouse name: Not on file    Number of children: Not on file    Years of education: Not on file    Highest education level: Not on file   Occupational History    Not on file   Tobacco Use    Smoking status: Former    Smokeless tobacco: Never   Substance and Sexual Activity    Alcohol use: Yes     Comment: daily    Drug use: No    Sexual activity: Not on file   Other Topics Concern    Not on file   Social History Narrative    Not on file     Social Determinants of Health     Financial Resource Strain: Not on file   Food Insecurity: Not on file   Transportation Needs: Not on file   Physical Activity: Not on file   Stress: Not on file   Social Connections: Not on file   Intimate Partner Violence: Not on file   Housing Stability: Not on file       History reviewed. No pertinent family history.    Allergies:  Pork-derived products    Home Medications:  Prior to Admission

## 2024-02-12 ENCOUNTER — APPOINTMENT (OUTPATIENT)
Dept: GENERAL RADIOLOGY | Age: 54
End: 2024-02-12
Payer: COMMERCIAL

## 2024-02-12 VITALS
SYSTOLIC BLOOD PRESSURE: 138 MMHG | RESPIRATION RATE: 16 BRPM | OXYGEN SATURATION: 100 % | HEART RATE: 97 BPM | TEMPERATURE: 97.5 F | DIASTOLIC BLOOD PRESSURE: 89 MMHG | BODY MASS INDEX: 20.99 KG/M2 | WEIGHT: 134.04 LBS

## 2024-02-12 PROCEDURE — 71045 X-RAY EXAM CHEST 1 VIEW: CPT

## 2024-02-12 NOTE — DISCHARGE INSTRUCTIONS
You have been evaluated in the Emergency Department at [facility] 2/12/2024     Your recommendations and if necessary prescriptions from today's visit:   -Take medication as prescribed  -Follow-up with your PCP    You need to call to make an appointment as directed for follow up.    Should you have any questions regarding your care or further treatment, please call [facility] Emergency Department at [phone].    PLEASE RETURN TO THE EMERGENCY DEPARTMENT IMMEDIATELY for   -Changes in your symptoms  -Worsening shortness of breath  -Chest pain  -Worsening changes in your vision  -Worsening symptoms  -Unable to follow up with your primary care provider  -Any other care or concern

## 2024-02-12 NOTE — ED NOTES
Patient to ED complaining of vision changes when he got to the shelter. Patient was seen here today for covid test and wast discharged after. Denies chest pain and states he has \"some SOB\" A&OX4, VS taken and recorded. Call light in reach. Plan of care on going.
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
## 2024-02-12 NOTE — ED PROVIDER NOTES
[facility] ED     Emergency Department     Faculty Attestation        I performed a history and physical examination of the patient and discussed management with the resident. I reviewed the resident’s note and agree with the documented findings and plan of care. Any areas of disagreement are noted on the chart. I was personally present for the key portions of any procedures. I have documented in the chart those procedures where I was not present during the key portions. I have reviewed the emergency nurses triage note. I agree with the chief complaint, past medical history, past surgical history, allergies, medications, social and family history as documented unless otherwise noted below.    For mid-level providers such as nurse practitioners as well as physicians assistants:    I have personally seen and evaluated the patient.    I find the patient's history and physical exam are consistent with NP/PA documentation.  I agree with the care provided, treatment rendered, disposition, & follow-up plan.     Additional findings are as noted.    Vital Signs: /89   Pulse 97   Temp 97.5 °F (36.4 °C) (Oral)   Resp 16   Wt 60.8 kg (134 lb 0.6 oz)   SpO2 100%   BMI 20.99 kg/m²   PCP:  No primary care provider on file.    Pertinent Comments:     Patient is currently living in the homeless shelter and he states he was smoking cigarettes and drinking.  Episode of a cough and a little bit of shortness of breath but is completely resolved he has no complaints currently he is sitting up in no acute distress regular rate and rhythm, lungs clear.      Critical Care  None          Ayan Sabillon MD    Attending Emergency Medicine Physician            Marcus Sabillon MD  02/12/24 0034

## 2024-04-04 ENCOUNTER — HOSPITAL ENCOUNTER (INPATIENT)
Age: 54
LOS: 4 days | Discharge: HOME | DRG: 377 | End: 2024-04-08
Attending: EMERGENCY MEDICINE | Admitting: INTERNAL MEDICINE
Payer: COMMERCIAL

## 2024-04-04 ENCOUNTER — APPOINTMENT (OUTPATIENT)
Dept: CT IMAGING | Age: 54
DRG: 377 | End: 2024-04-04
Payer: COMMERCIAL

## 2024-04-04 ENCOUNTER — APPOINTMENT (OUTPATIENT)
Dept: MRI IMAGING | Age: 54
DRG: 377 | End: 2024-04-04
Payer: COMMERCIAL

## 2024-04-04 DIAGNOSIS — D64.9 ANEMIA, UNSPECIFIED TYPE: ICD-10-CM

## 2024-04-04 DIAGNOSIS — K92.2 GASTROINTESTINAL HEMORRHAGE, UNSPECIFIED GASTROINTESTINAL HEMORRHAGE TYPE: Primary | ICD-10-CM

## 2024-04-04 DIAGNOSIS — K92.1 MELENA: ICD-10-CM

## 2024-04-04 DIAGNOSIS — K22.2 SCHATZKI'S RING: ICD-10-CM

## 2024-04-04 DIAGNOSIS — I21.4 NSTEMI (NON-ST ELEVATED MYOCARDIAL INFARCTION) (HCC): ICD-10-CM

## 2024-04-04 DIAGNOSIS — I20.0 UNSTABLE ANGINA PECTORIS (HCC): ICD-10-CM

## 2024-04-04 LAB
ALBUMIN SERPL-MCNC: 3.7 G/DL (ref 3.5–5.2)
ALBUMIN/GLOB SERPL: 2 {RATIO} (ref 1–2.5)
ALP SERPL-CCNC: 26 U/L (ref 40–129)
ALT SERPL-CCNC: 11 U/L (ref 10–50)
AMMONIA PLAS-SCNC: 15 UMOL/L (ref 16–60)
AMPHET UR QL SCN: NEGATIVE
ANION GAP SERPL CALCULATED.3IONS-SCNC: 14 MMOL/L (ref 9–16)
APAP SERPL-MCNC: <5 UG/ML (ref 10–30)
AST SERPL-CCNC: 30 U/L (ref 10–50)
BARBITURATES UR QL SCN: NEGATIVE
BASOPHILS # BLD: 0.24 K/UL (ref 0–0.2)
BASOPHILS NFR BLD: 2 % (ref 0–2)
BENZODIAZ UR QL: NEGATIVE
BILIRUB DIRECT SERPL-MCNC: <0.2 MG/DL (ref 0–0.3)
BILIRUB INDIRECT SERPL-MCNC: 0.2 MG/DL (ref 0–1)
BILIRUB SERPL-MCNC: 0.3 MG/DL (ref 0–1.2)
BILIRUB UR QL STRIP: NEGATIVE
BUN BLD-MCNC: 37 MG/DL (ref 8–26)
BUN SERPL-MCNC: 36 MG/DL (ref 6–20)
CA-I BLD-SCNC: 1.26 MMOL/L (ref 1.15–1.33)
CALCIUM SERPL-MCNC: 9.5 MG/DL (ref 8.6–10.4)
CANNABINOIDS UR QL SCN: POSITIVE
CHLORIDE BLD-SCNC: 112 MMOL/L (ref 98–107)
CHLORIDE SERPL-SCNC: 110 MMOL/L (ref 98–107)
CK SERPL-CCNC: 200 U/L (ref 39–308)
CLARITY UR: CLEAR
CO2 BLD CALC-SCNC: 20 MMOL/L (ref 22–30)
CO2 SERPL-SCNC: 20 MMOL/L (ref 20–31)
COCAINE UR QL SCN: NEGATIVE
COLOR UR: YELLOW
COMMENT: ABNORMAL
CREAT SERPL-MCNC: 2.2 MG/DL (ref 0.7–1.2)
EGFR, POC: 35 ML/MIN/1.73M2
EOSINOPHIL # BLD: 0 K/UL (ref 0–0.4)
EOSINOPHILS RELATIVE PERCENT: 0 % (ref 1–4)
ERYTHROCYTE [DISTWIDTH] IN BLOOD BY AUTOMATED COUNT: 22.3 % (ref 11.8–14.4)
ETHANOL PERCENT: <0.01 %
ETHANOLAMINE SERPL-MCNC: <10 MG/DL
FENTANYL UR QL: NEGATIVE
GFR SERPL CREATININE-BSD FRML MDRD: 35 ML/MIN/1.73M2
GLOBULIN SER CALC-MCNC: 1.7 G/DL
GLUCOSE BLD-MCNC: 118 MG/DL (ref 74–100)
GLUCOSE SERPL-MCNC: 122 MG/DL (ref 74–99)
GLUCOSE UR STRIP-MCNC: NEGATIVE MG/DL
HCO3 VENOUS: 20.8 MMOL/L (ref 22–29)
HCT VFR BLD AUTO: 14 % (ref 41–53)
HCT VFR BLD AUTO: 16.5 % (ref 40.7–50.3)
HGB BLD-MCNC: 4.9 G/DL (ref 13–17)
HGB UR QL STRIP.AUTO: NEGATIVE
IMM GRANULOCYTES # BLD AUTO: 0 K/UL (ref 0–0.3)
IMM GRANULOCYTES NFR BLD: 0 %
IMM RETICS NFR: 49.1 % (ref 2.7–18.3)
INR PPP: 1.1
IRON SATN MFR SERPL: 25 % (ref 20–55)
IRON SERPL-MCNC: 77 UG/DL (ref 61–157)
KETONES UR STRIP-MCNC: ABNORMAL MG/DL
LACTIC ACID, SEPSIS WHOLE BLOOD: 1.2 MMOL/L (ref 0.5–1.9)
LACTIC ACID, SEPSIS WHOLE BLOOD: 1.9 MMOL/L (ref 0.5–1.9)
LDH SERPL-CCNC: 168 U/L (ref 135–225)
LEUKOCYTE ESTERASE UR QL STRIP: NEGATIVE
LYMPHOCYTES NFR BLD: 2.93 K/UL (ref 1–4.8)
LYMPHOCYTES RELATIVE PERCENT: 24 % (ref 24–44)
MCH RBC QN AUTO: 22.6 PG (ref 25.2–33.5)
MCHC RBC AUTO-ENTMCNC: 29.7 G/DL (ref 28.4–34.8)
MCV RBC AUTO: 76 FL (ref 82.6–102.9)
METHADONE UR QL: NEGATIVE
MONOCYTES NFR BLD: 0.98 K/UL (ref 0.1–0.8)
MONOCYTES NFR BLD: 8 % (ref 1–7)
MORPHOLOGY: ABNORMAL
MYOGLOBIN SERPL-MCNC: 104 NG/ML (ref 28–72)
NEGATIVE BASE EXCESS, VEN: 2.8 MMOL/L (ref 0–2)
NEUTROPHILS NFR BLD: 66 % (ref 36–66)
NEUTS SEG NFR BLD: 8.05 K/UL (ref 1.8–7.7)
NITRITE UR QL STRIP: NEGATIVE
NRBC BLD-RTO: 2.7 PER 100 WBC
NUCLEATED RED BLOOD CELLS: 4 PER 100 WBC
O2 SAT, VEN: 30.4 % (ref 60–85)
OPIATES UR QL SCN: NEGATIVE
OXYCODONE UR QL SCN: NEGATIVE
PARTIAL THROMBOPLASTIN TIME: 24.5 SEC (ref 23–36.5)
PCO2, VEN: 28.7 MM HG (ref 41–51)
PCP UR QL SCN: NEGATIVE
PH UR STRIP: 7.5 [PH] (ref 5–8)
PH VENOUS: 7.47 (ref 7.32–7.43)
PLATELET # BLD AUTO: 358 K/UL (ref 138–453)
PMV BLD AUTO: 10.7 FL (ref 8.1–13.5)
PO2, VEN: 17.8 MM HG (ref 30–50)
POC ANION GAP: 10 MMOL/L (ref 7–16)
POC CREATININE: 2.2 MG/DL (ref 0.51–1.19)
POC HEMOGLOBIN (CALC): 4.7 G/DL (ref 13.5–17.5)
POC LACTIC ACID: 2.2 MMOL/L (ref 0.56–1.39)
POTASSIUM BLD-SCNC: 3.5 MMOL/L (ref 3.5–4.5)
POTASSIUM SERPL-SCNC: 3.7 MMOL/L (ref 3.7–5.3)
PROT SERPL-MCNC: 5.4 G/DL (ref 6.6–8.7)
PROT UR STRIP-MCNC: NEGATIVE MG/DL
PROTHROMBIN TIME: 13.7 SEC (ref 11.7–14.9)
RBC # BLD AUTO: 2.17 M/UL (ref 4.21–5.77)
RETIC HEMOGLOBIN: 22.7 PG (ref 28.2–35.7)
RETICS # AUTO: 0.31 M/UL (ref 0.03–0.08)
RETICS/RBC NFR AUTO: 13.8 % (ref 0.5–1.9)
SALICYLATES SERPL-MCNC: <0.5 MG/DL (ref 0–10)
SODIUM BLD-SCNC: 141 MMOL/L (ref 138–146)
SODIUM SERPL-SCNC: 144 MMOL/L (ref 136–145)
SP GR UR STRIP: 1.01 (ref 1–1.03)
T4 FREE SERPL-MCNC: 1.3 NG/DL (ref 0.92–1.68)
TEST INFORMATION: ABNORMAL
TIBC SERPL-MCNC: 303 UG/DL (ref 250–450)
TROPONIN I SERPL HS-MCNC: 115 NG/L (ref 0–22)
TROPONIN I SERPL HS-MCNC: 128 NG/L (ref 0–22)
TROPONIN I SERPL HS-MCNC: 151 NG/L (ref 0–22)
TSH SERPL DL<=0.05 MIU/L-ACNC: 6.06 UIU/ML (ref 0.27–4.2)
UNSATURATED IRON BINDING CAPACITY: 226 UG/DL (ref 112–347)
UROBILINOGEN UR STRIP-ACNC: NORMAL EU/DL (ref 0–1)
WBC OTHER # BLD: 12.2 K/UL (ref 3.5–11.3)

## 2024-04-04 PROCEDURE — 86900 BLOOD TYPING SEROLOGIC ABO: CPT

## 2024-04-04 PROCEDURE — 80307 DRUG TEST PRSMV CHEM ANLYZR: CPT

## 2024-04-04 PROCEDURE — 85045 AUTOMATED RETICULOCYTE COUNT: CPT

## 2024-04-04 PROCEDURE — 2580000003 HC RX 258

## 2024-04-04 PROCEDURE — 80143 DRUG ASSAY ACETAMINOPHEN: CPT

## 2024-04-04 PROCEDURE — 82330 ASSAY OF CALCIUM: CPT

## 2024-04-04 PROCEDURE — 80048 BASIC METABOLIC PNL TOTAL CA: CPT

## 2024-04-04 PROCEDURE — 82607 VITAMIN B-12: CPT

## 2024-04-04 PROCEDURE — 83550 IRON BINDING TEST: CPT

## 2024-04-04 PROCEDURE — 83874 ASSAY OF MYOGLOBIN: CPT

## 2024-04-04 PROCEDURE — 83605 ASSAY OF LACTIC ACID: CPT

## 2024-04-04 PROCEDURE — 82140 ASSAY OF AMMONIA: CPT

## 2024-04-04 PROCEDURE — 85014 HEMATOCRIT: CPT

## 2024-04-04 PROCEDURE — 86901 BLOOD TYPING SEROLOGIC RH(D): CPT

## 2024-04-04 PROCEDURE — 84484 ASSAY OF TROPONIN QUANT: CPT

## 2024-04-04 PROCEDURE — 85610 PROTHROMBIN TIME: CPT

## 2024-04-04 PROCEDURE — 83540 ASSAY OF IRON: CPT

## 2024-04-04 PROCEDURE — 99285 EMERGENCY DEPT VISIT HI MDM: CPT

## 2024-04-04 PROCEDURE — 85730 THROMBOPLASTIN TIME PARTIAL: CPT

## 2024-04-04 PROCEDURE — 82565 ASSAY OF CREATININE: CPT

## 2024-04-04 PROCEDURE — 2580000003 HC RX 258: Performed by: EMERGENCY MEDICINE

## 2024-04-04 PROCEDURE — 70450 CT HEAD/BRAIN W/O DYE: CPT

## 2024-04-04 PROCEDURE — 82803 BLOOD GASES ANY COMBINATION: CPT

## 2024-04-04 PROCEDURE — 87040 BLOOD CULTURE FOR BACTERIA: CPT

## 2024-04-04 PROCEDURE — 70547 MR ANGIOGRAPHY NECK W/O DYE: CPT

## 2024-04-04 PROCEDURE — 80076 HEPATIC FUNCTION PANEL: CPT

## 2024-04-04 PROCEDURE — 84439 ASSAY OF FREE THYROXINE: CPT

## 2024-04-04 PROCEDURE — 85025 COMPLETE CBC W/AUTO DIFF WBC: CPT

## 2024-04-04 PROCEDURE — 96374 THER/PROPH/DIAG INJ IV PUSH: CPT

## 2024-04-04 PROCEDURE — 86850 RBC ANTIBODY SCREEN: CPT

## 2024-04-04 PROCEDURE — C9113 INJ PANTOPRAZOLE SODIUM, VIA: HCPCS | Performed by: EMERGENCY MEDICINE

## 2024-04-04 PROCEDURE — 82947 ASSAY GLUCOSE BLOOD QUANT: CPT

## 2024-04-04 PROCEDURE — 83615 LACTATE (LD) (LDH) ENZYME: CPT

## 2024-04-04 PROCEDURE — 86920 COMPATIBILITY TEST SPIN: CPT

## 2024-04-04 PROCEDURE — 2060000000 HC ICU INTERMEDIATE R&B

## 2024-04-04 PROCEDURE — 70551 MRI BRAIN STEM W/O DYE: CPT

## 2024-04-04 PROCEDURE — 70544 MR ANGIOGRAPHY HEAD W/O DYE: CPT

## 2024-04-04 PROCEDURE — 6360000002 HC RX W HCPCS: Performed by: EMERGENCY MEDICINE

## 2024-04-04 PROCEDURE — P9016 RBC LEUKOCYTES REDUCED: HCPCS

## 2024-04-04 PROCEDURE — A4216 STERILE WATER/SALINE, 10 ML: HCPCS | Performed by: EMERGENCY MEDICINE

## 2024-04-04 PROCEDURE — G0480 DRUG TEST DEF 1-7 CLASSES: HCPCS

## 2024-04-04 PROCEDURE — 84520 ASSAY OF UREA NITROGEN: CPT

## 2024-04-04 PROCEDURE — 84443 ASSAY THYROID STIM HORMONE: CPT

## 2024-04-04 PROCEDURE — 80179 DRUG ASSAY SALICYLATE: CPT

## 2024-04-04 PROCEDURE — 81003 URINALYSIS AUTO W/O SCOPE: CPT

## 2024-04-04 PROCEDURE — 82553 CREATINE MB FRACTION: CPT

## 2024-04-04 PROCEDURE — 30233N1 TRANSFUSION OF NONAUTOLOGOUS RED BLOOD CELLS INTO PERIPHERAL VEIN, PERCUTANEOUS APPROACH: ICD-10-PCS | Performed by: EMERGENCY MEDICINE

## 2024-04-04 PROCEDURE — 82746 ASSAY OF FOLIC ACID SERUM: CPT

## 2024-04-04 PROCEDURE — 80051 ELECTROLYTE PANEL: CPT

## 2024-04-04 PROCEDURE — 93005 ELECTROCARDIOGRAM TRACING: CPT | Performed by: EMERGENCY MEDICINE

## 2024-04-04 PROCEDURE — 82550 ASSAY OF CK (CPK): CPT

## 2024-04-04 RX ORDER — SODIUM CHLORIDE 0.9 % (FLUSH) 0.9 %
5-40 SYRINGE (ML) INJECTION EVERY 12 HOURS SCHEDULED
Status: DISCONTINUED | OUTPATIENT
Start: 2024-04-04 | End: 2024-04-08 | Stop reason: HOSPADM

## 2024-04-04 RX ORDER — SODIUM CHLORIDE 9 MG/ML
INJECTION, SOLUTION INTRAVENOUS PRN
Status: DISCONTINUED | OUTPATIENT
Start: 2024-04-04 | End: 2024-04-08 | Stop reason: HOSPADM

## 2024-04-04 RX ORDER — POLYETHYLENE GLYCOL 3350 17 G/17G
17 POWDER, FOR SOLUTION ORAL DAILY PRN
Status: DISCONTINUED | OUTPATIENT
Start: 2024-04-04 | End: 2024-04-08 | Stop reason: HOSPADM

## 2024-04-04 RX ORDER — ACETAMINOPHEN 650 MG/1
650 SUPPOSITORY RECTAL EVERY 6 HOURS PRN
Status: DISCONTINUED | OUTPATIENT
Start: 2024-04-04 | End: 2024-04-08 | Stop reason: HOSPADM

## 2024-04-04 RX ORDER — SODIUM CHLORIDE 0.9 % (FLUSH) 0.9 %
5-40 SYRINGE (ML) INJECTION PRN
Status: DISCONTINUED | OUTPATIENT
Start: 2024-04-04 | End: 2024-04-08 | Stop reason: HOSPADM

## 2024-04-04 RX ORDER — ACETAMINOPHEN 325 MG/1
650 TABLET ORAL EVERY 6 HOURS PRN
Status: DISCONTINUED | OUTPATIENT
Start: 2024-04-04 | End: 2024-04-08 | Stop reason: HOSPADM

## 2024-04-04 RX ORDER — SODIUM CHLORIDE, SODIUM LACTATE, POTASSIUM CHLORIDE, CALCIUM CHLORIDE 600; 310; 30; 20 MG/100ML; MG/100ML; MG/100ML; MG/100ML
INJECTION, SOLUTION INTRAVENOUS CONTINUOUS
Status: DISCONTINUED | OUTPATIENT
Start: 2024-04-04 | End: 2024-04-07

## 2024-04-04 RX ORDER — ONDANSETRON 4 MG/1
4 TABLET, ORALLY DISINTEGRATING ORAL EVERY 8 HOURS PRN
Status: DISCONTINUED | OUTPATIENT
Start: 2024-04-04 | End: 2024-04-08 | Stop reason: HOSPADM

## 2024-04-04 RX ORDER — ONDANSETRON 2 MG/ML
4 INJECTION INTRAMUSCULAR; INTRAVENOUS EVERY 6 HOURS PRN
Status: DISCONTINUED | OUTPATIENT
Start: 2024-04-04 | End: 2024-04-08 | Stop reason: HOSPADM

## 2024-04-04 RX ADMIN — PANTOPRAZOLE SODIUM 8 MG/HR: 40 INJECTION, POWDER, FOR SOLUTION INTRAVENOUS at 22:22

## 2024-04-04 RX ADMIN — SODIUM CHLORIDE, POTASSIUM CHLORIDE, SODIUM LACTATE AND CALCIUM CHLORIDE: 600; 310; 30; 20 INJECTION, SOLUTION INTRAVENOUS at 22:55

## 2024-04-04 RX ADMIN — SODIUM CHLORIDE, PRESERVATIVE FREE 5 ML: 5 INJECTION INTRAVENOUS at 22:36

## 2024-04-04 RX ADMIN — PANTOPRAZOLE SODIUM 80 MG: 40 INJECTION, POWDER, FOR SOLUTION INTRAVENOUS at 18:24

## 2024-04-04 NOTE — ED PROVIDER NOTES
[facility]     Emergency Department     Faculty Attestation    I performed a history and physical examination of the patient and discussed management with the resident. I reviewed the resident’s note and agree with the documented findings and plan of care. Any areas of disagreement are noted on the chart. I was personally present for the key portions of any procedures. I have documented in the chart those procedures where I was not present during the key portions. I have reviewed the emergency nurses triage note. I agree with the chief complaint, past medical history, past surgical history, allergies, medications, social and family history as documented unless otherwise noted below.        For Physician Assistant/ Nurse Practitioner cases/documentation I have personally evaluated this patient and have completed at least one if not all key elements of the E/M (history, physical exam, and MDM). Additional findings are as noted.  I have personally seen and evaluated the patient.  I find the patient's history and physical exam are consistent with the NP/PA documentation.  I agree with the care provided, treatment rendered, disposition and follow-up plan.      EKG Interpretation    Interpreted by emergency department physician    Rhythm: normal sinus   Rate: normal  Axis: normal  Conduction: normal  ST Segments: Minimal depression noted in inferior lateral leads  T Waves: Inversions noted in the inferior leads and V4  Q Waves: no acute change      Describing blurry vision of both eyes as well as chest pain and dyspnea symptoms have been ongoing for several days    Clinical Impression:  nonspecific EKG.          Critical Care     Leon Granados M.D.  Attending Emergency  Physician            Leon Granados MD  04/04/24 7102

## 2024-04-04 NOTE — CONSENT
Informed Consent for Blood Component Transfusion Note    I have discussed with the sister the rationale for blood component transfusion; its benefits in treating or preventing fatigue, organ damage, or death; and its risk which includes mild transfusion reactions, rare risk of blood borne infection, or more serious but rare reactions. I have discussed the alternatives to transfusion, including the risk and consequences of not receiving transfusion. The sister had an opportunity to ask questions and had agreed to proceed with transfusion of blood components.    Electronically signed by Akosua Rubalcava DO on 4/4/24 at 6:24 PM EDT

## 2024-04-04 NOTE — ED NOTES
Pt arrives va triage, pt c/o of SOB with chest pain.   Pt states this all started up about a week ago.   Pt states he has been seen for his SOB and was given medications, pt states he has a respiratory infection.   Pt also states he was told he has the start of heart failure.   Pt denies wearing any O2 at home.   Pt states he is a smoker but is slowing down smoking.   Pt also drinks 2 tall boys daily but have never gone through withdrawals.   Pt is A+Ox4, call light in reach.
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
## 2024-04-05 ENCOUNTER — APPOINTMENT (OUTPATIENT)
Age: 54
DRG: 377 | End: 2024-04-05
Attending: INTERNAL MEDICINE
Payer: COMMERCIAL

## 2024-04-05 PROBLEM — I21.4 NSTEMI (NON-ST ELEVATED MYOCARDIAL INFARCTION) (HCC): Status: ACTIVE | Noted: 2024-04-05

## 2024-04-05 LAB
ECHO AO ROOT DIAM: 2.7 CM
ECHO AO ROOT INDEX: 1.7 CM/M2
ECHO AV AREA PEAK VELOCITY: 1.7 CM2
ECHO AV AREA VTI: 1.9 CM2
ECHO AV AREA/BSA PEAK VELOCITY: 1.1 CM2/M2
ECHO AV AREA/BSA VTI: 1.2 CM2/M2
ECHO AV MEAN GRADIENT: 6 MMHG
ECHO AV MEAN VELOCITY: 1.1 M/S
ECHO AV PEAK GRADIENT: 11 MMHG
ECHO AV PEAK VELOCITY: 1.7 M/S
ECHO AV VELOCITY RATIO: 0.71
ECHO AV VTI: 37.8 CM
ECHO BSA: 1.56 M2
ECHO EST RA PRESSURE: 10 MMHG
ECHO IVC PROX: 1.9 CM
ECHO LA AREA 2C: 16.5 CM2
ECHO LA AREA 4C: 13.7 CM2
ECHO LA DIAMETER INDEX: 2.01 CM/M2
ECHO LA DIAMETER: 3.2 CM
ECHO LA MAJOR AXIS: 5 CM
ECHO LA MINOR AXIS: 4.4 CM
ECHO LA TO AORTIC ROOT RATIO: 1.19
ECHO LA VOL BP: 40 ML (ref 18–58)
ECHO LA VOL MOD A2C: 49 ML (ref 18–58)
ECHO LA VOL MOD A4C: 29 ML (ref 18–58)
ECHO LA VOL/BSA BIPLANE: 25 ML/M2 (ref 16–34)
ECHO LA VOLUME INDEX MOD A2C: 31 ML/M2 (ref 16–34)
ECHO LA VOLUME INDEX MOD A4C: 18 ML/M2 (ref 16–34)
ECHO LV E' LATERAL VELOCITY: 13 CM/S
ECHO LV E' SEPTAL VELOCITY: 9 CM/S
ECHO LV EDV A2C: 84 ML
ECHO LV EDV A4C: 71 ML
ECHO LV EDV INDEX A4C: 45 ML/M2
ECHO LV EDV NDEX A2C: 53 ML/M2
ECHO LV EJECTION FRACTION A2C: 72 %
ECHO LV EJECTION FRACTION A4C: 56 %
ECHO LV EJECTION FRACTION BIPLANE: 65 % (ref 55–100)
ECHO LV ESV A2C: 23 ML
ECHO LV ESV A4C: 31 ML
ECHO LV ESV INDEX A2C: 14 ML/M2
ECHO LV ESV INDEX A4C: 19 ML/M2
ECHO LV INTERNAL DIMENSION DIASTOLE INDEX: 2.58 CM/M2
ECHO LV INTERNAL DIMENSION DIASTOLIC: 4.1 CM (ref 4.2–5.9)
ECHO LV IVSD: 1.1 CM (ref 0.6–1)
ECHO LV MASS 2D: 151.3 G (ref 88–224)
ECHO LV MASS INDEX 2D: 95.2 G/M2 (ref 49–115)
ECHO LV POSTERIOR WALL DIASTOLIC: 1.1 CM (ref 0.6–1)
ECHO LV RELATIVE WALL THICKNESS RATIO: 0.54
ECHO LVOT AREA: 2.5 CM2
ECHO LVOT AV VTI INDEX: 0.76
ECHO LVOT DIAM: 1.8 CM
ECHO LVOT MEAN GRADIENT: 3 MMHG
ECHO LVOT PEAK GRADIENT: 5 MMHG
ECHO LVOT PEAK VELOCITY: 1.2 M/S
ECHO LVOT STROKE VOLUME INDEX: 46.1 ML/M2
ECHO LVOT SV: 73.2 ML
ECHO LVOT VTI: 28.8 CM
ECHO MV A VELOCITY: 0.94 M/S
ECHO MV AREA VTI: 1.6 CM2
ECHO MV E DECELERATION TIME (DT): 187 MS
ECHO MV E VELOCITY: 1.32 M/S
ECHO MV E/A RATIO: 1.4
ECHO MV E/E' LATERAL: 10.15
ECHO MV E/E' RATIO (AVERAGED): 12.41
ECHO MV LVOT VTI INDEX: 1.61
ECHO MV MAX VELOCITY: 1.5 M/S
ECHO MV MEAN GRADIENT: 2 MMHG
ECHO MV MEAN VELOCITY: 0.6 M/S
ECHO MV PEAK GRADIENT: 9 MMHG
ECHO MV VTI: 46.5 CM
ECHO PULMONARY ARTERY END DIASTOLIC PRESSURE: 3 MMHG
ECHO PV MAX VELOCITY: 0.9 M/S
ECHO PV PEAK GRADIENT: 3 MMHG
ECHO PV REGURGITANT MAX VELOCITY: 0.9 M/S
ECHO RIGHT VENTRICULAR SYSTOLIC PRESSURE (RVSP): 27 MMHG
ECHO RV BASAL DIMENSION: 3.9 CM
ECHO RV INTERNAL DIMENSION: 1.9 CM
ECHO RV MID DIMENSION: 2.9 CM
ECHO RV TAPSE: 2.2 CM (ref 1.7–?)
ECHO TV REGURGITANT MAX VELOCITY: 2.09 M/S
ECHO TV REGURGITANT PEAK GRADIENT: 17 MMHG
EKG ATRIAL RATE: 82 BPM
EKG P AXIS: 78 DEGREES
EKG P-R INTERVAL: 130 MS
EKG Q-T INTERVAL: 384 MS
EKG QRS DURATION: 82 MS
EKG QTC CALCULATION (BAZETT): 448 MS
EKG R AXIS: 71 DEGREES
EKG T AXIS: -65 DEGREES
EKG VENTRICULAR RATE: 82 BPM
FERRITIN SERPL-MCNC: 77 NG/ML (ref 30–400)
FOLATE SERPL-MCNC: >20 NG/ML (ref 4.8–24.2)
HCT VFR BLD AUTO: 17.1 % (ref 40.7–50.3)
HCT VFR BLD AUTO: 20 % (ref 40.7–50.3)
HCT VFR BLD AUTO: 34 % (ref 40.7–50.3)
HGB BLD-MCNC: 10.4 G/DL (ref 13–17)
HGB BLD-MCNC: 5.4 G/DL (ref 13–17)
HGB BLD-MCNC: 6.4 G/DL (ref 13–17)
TROPONIN I SERPL HS-MCNC: 123 NG/L (ref 0–22)
TROPONIN I SERPL HS-MCNC: 139 NG/L (ref 0–22)
VIT B12 SERPL-MCNC: 736 PG/ML (ref 232–1245)

## 2024-04-05 PROCEDURE — 99254 IP/OBS CNSLTJ NEW/EST MOD 60: CPT | Performed by: INTERNAL MEDICINE

## 2024-04-05 PROCEDURE — 2580000003 HC RX 258

## 2024-04-05 PROCEDURE — 93306 TTE W/DOPPLER COMPLETE: CPT | Performed by: INTERNAL MEDICINE

## 2024-04-05 PROCEDURE — P9016 RBC LEUKOCYTES REDUCED: HCPCS

## 2024-04-05 PROCEDURE — 99223 1ST HOSP IP/OBS HIGH 75: CPT | Performed by: INTERNAL MEDICINE

## 2024-04-05 PROCEDURE — 6360000002 HC RX W HCPCS

## 2024-04-05 PROCEDURE — C9113 INJ PANTOPRAZOLE SODIUM, VIA: HCPCS

## 2024-04-05 PROCEDURE — 85018 HEMOGLOBIN: CPT

## 2024-04-05 PROCEDURE — 93306 TTE W/DOPPLER COMPLETE: CPT

## 2024-04-05 PROCEDURE — 36430 TRANSFUSION BLD/BLD COMPNT: CPT

## 2024-04-05 PROCEDURE — 6370000000 HC RX 637 (ALT 250 FOR IP): Performed by: INTERNAL MEDICINE

## 2024-04-05 PROCEDURE — 2580000003 HC RX 258: Performed by: INTERNAL MEDICINE

## 2024-04-05 PROCEDURE — 85014 HEMATOCRIT: CPT

## 2024-04-05 PROCEDURE — 36415 COLL VENOUS BLD VENIPUNCTURE: CPT

## 2024-04-05 PROCEDURE — 2060000000 HC ICU INTERMEDIATE R&B

## 2024-04-05 PROCEDURE — 84484 ASSAY OF TROPONIN QUANT: CPT

## 2024-04-05 PROCEDURE — 82728 ASSAY OF FERRITIN: CPT

## 2024-04-05 PROCEDURE — 93010 ELECTROCARDIOGRAM REPORT: CPT | Performed by: INTERNAL MEDICINE

## 2024-04-05 PROCEDURE — 99447 NTRPROF PH1/NTRNET/EHR 11-20: CPT | Performed by: PSYCHIATRY & NEUROLOGY

## 2024-04-05 RX ORDER — SODIUM CHLORIDE 9 MG/ML
INJECTION, SOLUTION INTRAVENOUS PRN
Status: DISCONTINUED | OUTPATIENT
Start: 2024-04-05 | End: 2024-04-08 | Stop reason: HOSPADM

## 2024-04-05 RX ORDER — SODIUM CHLORIDE 0.9 % (FLUSH) 0.9 %
5-40 SYRINGE (ML) INJECTION PRN
Status: DISCONTINUED | OUTPATIENT
Start: 2024-04-05 | End: 2024-04-08 | Stop reason: HOSPADM

## 2024-04-05 RX ORDER — ATORVASTATIN CALCIUM 40 MG/1
40 TABLET, FILM COATED ORAL NIGHTLY
Status: DISCONTINUED | OUTPATIENT
Start: 2024-04-05 | End: 2024-04-08 | Stop reason: HOSPADM

## 2024-04-05 RX ORDER — SODIUM CHLORIDE 0.9 % (FLUSH) 0.9 %
5-40 SYRINGE (ML) INJECTION EVERY 12 HOURS SCHEDULED
Status: DISCONTINUED | OUTPATIENT
Start: 2024-04-05 | End: 2024-04-08 | Stop reason: HOSPADM

## 2024-04-05 RX ORDER — LANOLIN ALCOHOL/MO/W.PET/CERES
100 CREAM (GRAM) TOPICAL DAILY
Status: DISCONTINUED | OUTPATIENT
Start: 2024-04-05 | End: 2024-04-08 | Stop reason: HOSPADM

## 2024-04-05 RX ADMIN — ATORVASTATIN CALCIUM 40 MG: 40 TABLET, FILM COATED ORAL at 20:21

## 2024-04-05 RX ADMIN — METOPROLOL TARTRATE 25 MG: 25 TABLET, FILM COATED ORAL at 20:21

## 2024-04-05 RX ADMIN — PANTOPRAZOLE SODIUM 8 MG/HR: 40 INJECTION, POWDER, FOR SOLUTION INTRAVENOUS at 09:01

## 2024-04-05 RX ADMIN — SODIUM CHLORIDE, PRESERVATIVE FREE 10 ML: 5 INJECTION INTRAVENOUS at 09:07

## 2024-04-05 RX ADMIN — SODIUM CHLORIDE, POTASSIUM CHLORIDE, SODIUM LACTATE AND CALCIUM CHLORIDE: 600; 310; 30; 20 INJECTION, SOLUTION INTRAVENOUS at 19:00

## 2024-04-05 RX ADMIN — METOPROLOL TARTRATE 25 MG: 25 TABLET, FILM COATED ORAL at 09:07

## 2024-04-05 RX ADMIN — Medication 100 MG: at 09:07

## 2024-04-05 RX ADMIN — SODIUM CHLORIDE, POTASSIUM CHLORIDE, SODIUM LACTATE AND CALCIUM CHLORIDE: 600; 310; 30; 20 INJECTION, SOLUTION INTRAVENOUS at 09:02

## 2024-04-05 NOTE — H&P
Critical Care - History and Physical Examination    Patient's name:  Angela Ghotra  Medical Record Number: [id number]  Patient's account/billing number: [id number]  Patient's YOB: 1970  Age: 54 y.o.  Date of Admission: 4/4/2024  5:11 PM  Reason of ICU admission:   Date of History and Physical Examination: 4/4/2024      Primary Care Physician: No primary care provider on file.  Attending Physician:    Code Status: Prior    Chief complaint: GI bleed, weakness, dizziness    Reason for ICU admission: Hemoglobin 4.9, for overnight monitoring of hemoglobin in the MICU and GI evaluation      History Of Present Illness:   History was obtained from chart review and the patient.      Angela Ghotra is a 54 y.o. with past medical history significant for recent positive stress test in February 2023 presented himself to the emergency room with a chief complaint of weakness, dizziness, GI bleed and black melanotic stool for last 1 month.  Patient is homeless and recently went to a free outpatient clinic and was diagnosed with URI and was given some cough drops.  Subsequently yesterday patient felt increased weakness and dizziness and presented herself to the emergency room where hemoglobin was found to be critically low at 4.9.  Troponins were uptrending.  Troponins 128.  EKG showed inferolateral ST depression consistent with NSTEMI.  During the ER evaluation patient had left-sided weakness and dysarthria.  Stroke alert was called.  All the stroke workup including MRI neck, MRI, CT head was unremarkable.  Patient required MICU admission for overnight monitoring of the hemoglobin.          Past Medical History:        Diagnosis Date    Hypertension        Past Surgical History:        Procedure Laterality Date    BACK SURGERY         Allergies:    Allergies   Allergen Reactions    Pork-Derived Products Other (See Comments)         Home Medications:   Prior to Admission medications    Medication Sig Start Date End Date  Taking? Authorizing Provider   vitamin D (ERGOCALCIFEROL) 1.25 MG (06987 UT) CAPS capsule  11/2/23   Wilfrido Tineo MD   naproxen sodium (ANAPROX) 550 MG tablet Take 1 tablet by mouth 2 times daily (with meals) 9/20/20   Wilfrido Tineo MD   rosuvastatin (CRESTOR) 5 MG tablet  11/2/23   Wilfrido Tineo MD   Cholecalciferol (VITAMIN D3) 50 MCG (2000 UT) CAPS  11/2/23   Wilfrido Tineo MD   tadalafil (CIALIS) 5 MG tablet Take 1 tablet by mouth daily 1/15/24   Luis M Romo MD   amLODIPine (NORVASC) 5 MG tablet Take 1 tablet by mouth daily 8/4/23   Selvin Ruby MD   carvedilol (COREG) 12.5 MG tablet Take 1 tablet by mouth 2 times daily (with meals) 8/4/23   Selvin Ruby MD       Social History:   TOBACCO:   reports that he has quit smoking. He has never used smokeless tobacco.  ETOH:   reports current alcohol use.  DRUGS:  reports no history of drug use.  OCCUPATION:      Family History:   No family history on file.        REVIEW OF SYSTEMS (ROS):  Review of Systems - Negative except mentioned in HPI   General ROS: negative  Psychological ROS: negative  Ophthalmic ROS: negative  ENT: negative  Hematological and Lymphatic ROS: negative  Endocrine ROS: negative  Breast ROS: negative  Respiratory ROS: no cough, shortness of breath, or wheezing  Cardiovascular ROS: no chest pain or dyspnea on exertion  Gastrointestinal ROS:negative  Genito-Urinary ROS: negative  Musculoskeletal ROS: negative  Neurological ROS: negative  Dermatological ROS: negative      Physical Exam:    Vitals: BP (!) 121/56   Pulse 88   Temp 99.2 °F (37.3 °C)   Resp 24   Wt 51.7 kg (113 lb 15.7 oz)   SpO2 98%   BMI 17.85 kg/m²     Body weight:   Wt Readings from Last 3 Encounters:   04/04/24 51.7 kg (113 lb 15.7 oz)   02/11/24 60.8 kg (134 lb 0.6 oz)   02/11/24 65.8 kg (145 lb)       Body Mass Index : Body mass index is 17.85 kg/m².          PHYSICAL EXAMINATION :  Constitutional: Appears well, in no

## 2024-04-05 NOTE — ED NOTES
ED to inpatient nurses report      Chief Complaint:  Chief Complaint   Patient presents with    Shortness of Breath    Chest Pain     Present to ED from: Home    MOA:     LOC: alert and orientated to name, place, date  Mobility: Independent  Oxygen Baseline: RA    Current needs required: 2L O2, for low hgb   Pending ED orders: Transfuse  Present condition: Stable    Why did the patient come to the ED? SOB, Chest pain  What is the plan? Blood transfusion   Any procedures or intervention occur? RBC  Any safety concerns??Fall Risk    Mental Status:  Level of Consciousness: Alert (0)    Psych Assessment:   Psychosocial  Psychosocial (WDL): Within Defined Limits  Vital signs   Vitals:    04/04/24 1746 04/04/24 1747 04/04/24 1752 04/04/24 1922   BP:  112/72     Pulse: 86 86 93 86   Resp: 14 19 20 15   Temp:       TempSrc:       SpO2: 100% 100% 100% 100%   Weight:            Vitals:  Patient Vitals for the past 24 hrs:   BP Temp Temp src Pulse Resp SpO2 Weight   04/04/24 1922 -- -- -- 86 15 100 % --   04/04/24 1752 -- -- -- 93 20 100 % --   04/04/24 1747 112/72 -- -- 86 19 100 % --   04/04/24 1746 -- -- -- 86 14 100 % --   04/04/24 1735 -- -- -- 88 22 100 % --   04/04/24 1733 (!) 112/59 -- -- 79 21 100 % --   04/04/24 1732 -- -- -- 80 25 100 % --   04/04/24 1710 (!) 107/57 97.2 °F (36.2 °C) Oral 95 16 93 % --   04/04/24 1709 -- -- -- -- -- -- 51.7 kg (113 lb 15.7 oz)      Visit Vitals  /72   Pulse 86   Temp 97.2 °F (36.2 °C) (Oral)   Resp 15   Wt 51.7 kg (113 lb 15.7 oz)   SpO2 100%   BMI 17.85 kg/m²        LDAs:   Peripheral IV 04/04/24 Right Forearm (Active)       Ambulatory Status:  Presents to emergency department  because of falls (Syncope, seizure, or loss of consciousness): No, Age > 70: No, Altered Mental Status, Intoxication with alcohol or substance confusion (Disorientation, impaired judgment, poor safety awaremess, or inability to follow instructions): No, Impaired Mobility: Ambulates or transfers with  FREE   POC BLOOD GAS AND CHEMISTRY   TYPE AND SCREEN   PREPARE RBC (CROSSMATCH)       Electronically signed by Madisyn Cordoba RN on 4/4/2024 at 8:11 PM

## 2024-04-05 NOTE — CONSULTS
Department of Neurology/Telestroke/Stroke  Resident Consult Note  Stroke Alert @5:31 PM  Arrival at bedside @5:34 PM    Reason for Consult:  Inpatient Stroke alert  Requesting Physician:  Dr. Akosua Rubalcava  Endovascular Neurosurgeon:   Kyler Reinoso MD    Stroke Team:  Aleksey Lindo MD      History Obtained From:  patient    CHIEF COMPLAINT:       Blurry vision, chest pain    HISTORY OF PRESENT ILLNESS:       The patient is a 54 y.o. male who presents with chest pain and blurry vision.  Patient has a past medical history of hypertension, CKD and hyperlipidemia.  Notably has had chronic blurry vision after an injury several decades ago.  Last known well reported as 1430 although varying between the story he was telling different providers.  He did note symptoms may have began a day ago and told 1 provider a month ago.  Exact last known well is unclear..  Stroke alert was called due to patient having difficulty with speech.  On evaluation patient seemed to be having difficulty getting words out.  Difficulty with naming and repetition.  Appears confused.  Initial NIH of 2, /57, blood glucose 122.  CT head was obtained which showed no acute intracranial abnormality.  Patient had a creatinine of 2.2 so CTA was deferred.  Patient deemed not a candidate for TNK due to unclear last known well as well as an initial hemoglobin reading of 4.7 concerning for underlying hemorrhage.    MRI brain and MRA head and neck were obtained which were unremarkable and ruled out ischemic process.      LKW: Unclear  NIHSS: 2  Modified Burlingham Scale: 1  SBP: 107  Glucose: 122  CT head without contrast: No acute intracranial abnormality  TNK: Not a candidate due to unclear last known well and concern for hemorrhage  Endovascular: No LVO or critical stenosis       PAST MEDICAL HISTORY :       Past Medical History:        Diagnosis Date    Hypertension        Past Surgical History:        Procedure Laterality Date    BACK SURGERY    bodily needs without assistance   [] 5:Severe disability; bedridden, incontinent and requiring constant nursing care and attention        LABS AND IMAGING:   CBC with Differential:    Lab Results   Component Value Date/Time    WBC 12.2 04/04/2024 05:41 PM    RBC 2.17 04/04/2024 05:41 PM    HGB 5.4 04/05/2024 12:27 AM    HCT 17.1 04/05/2024 12:27 AM     04/04/2024 05:41 PM    MCV 76.0 04/04/2024 05:41 PM    MCH 22.6 04/04/2024 05:41 PM    MCHC 29.7 04/04/2024 05:41 PM    RDW 22.3 04/04/2024 05:41 PM    NRBC 4 04/04/2024 05:41 PM    LYMPHOPCT 24 04/04/2024 05:41 PM    MONOPCT 8 04/04/2024 05:41 PM    BASOPCT 2 04/04/2024 05:41 PM    MONOSABS 0.98 04/04/2024 05:41 PM    LYMPHSABS 2.93 04/04/2024 05:41 PM    EOSABS 0.00 04/04/2024 05:41 PM    BASOSABS 0.24 04/04/2024 05:41 PM     BMP:    Lab Results   Component Value Date/Time     04/04/2024 05:41 PM    K 3.7 04/04/2024 05:41 PM     04/04/2024 05:41 PM    CO2 20 04/04/2024 05:41 PM    BUN 36 04/04/2024 05:41 PM    LABALBU 3.7 04/04/2024 08:12 PM    CREATININE 2.2 04/04/2024 05:41 PM    CREATININE 2.2 04/04/2024 05:35 PM    CALCIUM 9.5 04/04/2024 05:41 PM    LABGLOM 35 04/04/2024 05:41 PM    GLUCOSE 122 04/04/2024 05:41 PM     No results found for: \"LABA1C\"  No results found for: \"LDLCALC\", \"LDLCHOLESTEROL\", \"LDLDIRECT\"    Radiology Review:    1.) CT brain without contrast: (Reviewed at 17: 53)  No acute intracranial abnormality    2.)  MRA head/Neck: (Reviewed at 1947)  Unremarkable    3.) Brain MRI W/O: (Reviewed at 1947)  No acute ischemia    Assessment:       Angela Ghotra is a 54 y.o. right handed male with a history of hypertension, CKD and hyperlipidemia who presents with vision change and chest pain.  Found to have a critically low hemoglobin of 4.7, 4.9 on repeat.  MRI negative for ischemic process.      Severe anemia  GI bleed  Encephalopathy      Last Known Well (date and time)   Unclear     Candidate for IV Tenecteplase therapy    Yes []

## 2024-04-05 NOTE — CONSULTS
[facility]  Consultation Note     .  Chief Complaint:  Weakness, dizziness, and black melenic stools in the last month    Reason for consult:    Chronic GI bleed    History of present illness: This is a 54 y.o. male with PMH of hypertension, chronic alcohol dependence, tobacco abuse presented with dizziness and weakness.  Patient states that he could only walk roughly 100 feet without getting dizzy.  This was different because he used to be very active for many years.  Patient has been having emesis but has not had hematemesis.  He does state that he has had dark to black stools for well over a month.  He denies megan blood in his stools.    Patient initially presented to the emergency department staff was concerned due to his aphasia.  Patient was a stroke alert and CT head was conducted which was without abnormalities.  MRI and MRA of the head and neck were conducted which did not show any acute abnormalities.    Initial hemoglobin in the emergency department was 4.9 patient was given PRBC at that time.  At this time patient has received total of 3 units of PRBC due to resistant anemia.    Patient does state that he has had colonoscopy in the 1990s at some point due to GI bleeding.  Patient was found to have polyps at that time.  Patient denies extensive family history of polyps or colon cancer.    Past Medical/Social/Family History:  Past Medical History:   Diagnosis Date    Hypertension      Past Surgical History:   Procedure Laterality Date    BACK SURGERY       No family history on file.  Previous records/history/ and notes were reviewed    Allergies:  Allergies   Allergen Reactions    Pork-Derived Products Other (See Comments)       Home medications:  Prior to Admission medications    Medication Sig Start Date End Date Taking? Authorizing Provider   vitamin D (ERGOCALCIFEROL) 1.25 MG (59919 UT) CAPS capsule  11/2/23   Provider, MD Wilfrido   naproxen sodium (ANAPROX) 550 MG tablet  Take 1 tablet by mouth 2 times daily (with meals) 9/20/20   Wilfrido Tineo MD   rosuvastatin (CRESTOR) 5 MG tablet  11/2/23   Wilfrido Tineo MD   Cholecalciferol (VITAMIN D3) 50 MCG (2000 UT) CAPS  11/2/23   Wilfrido Tineo MD   tadalafil (CIALIS) 5 MG tablet Take 1 tablet by mouth daily 1/15/24   Luis M Romo MD   amLODIPine (NORVASC) 5 MG tablet Take 1 tablet by mouth daily 8/4/23   Selvin Ruby MD   carvedilol (COREG) 12.5 MG tablet Take 1 tablet by mouth 2 times daily (with meals) 8/4/23   Selvin Ruby MD     .  Current Medications:  Scheduled Meds:   sodium chloride flush  5-40 mL IntraVENous 2 times per day    thiamine  100 mg Oral Daily    atorvastatin  40 mg Oral Nightly    metoprolol tartrate  25 mg Oral BID    [START ON 4/7/2024] pantoprazole (PROTONIX) 40 mg in sodium chloride (PF) 0.9 % 10 mL injection  40 mg IntraVENous Daily    sodium chloride flush  5-40 mL IntraVENous 2 times per day     .  Continuous Infusions:   sodium chloride      sodium chloride      sodium chloride      sodium chloride      pantoprazole 8 mg/hr (04/05/24 0901)    sodium chloride      sodium chloride      lactated ringers IV soln 125 mL/hr at 04/05/24 0902     .  PRN Meds:sodium chloride, sodium chloride, sodium chloride flush, sodium chloride, sodium chloride, sodium chloride, sodium chloride flush, sodium chloride, ondansetron **OR** ondansetron, polyethylene glycol, acetaminophen **OR** acetaminophen    Review of Systems   Constitutional:  Positive for activity change and fatigue. Negative for chills and fever.   Respiratory:  Negative for chest tightness and shortness of breath.    Cardiovascular:  Negative for chest pain and palpitations.   Gastrointestinal:  Positive for nausea and vomiting. Negative for abdominal distention, abdominal pain, constipation and diarrhea.   Musculoskeletal:  Negative for back pain.   Skin:  Negative for rash and wound.   Neurological:  Positive for dizziness,    INR 1.1       BMP:  Recent Labs     04/04/24  1735 04/04/24  1741   NA  --  144   K  --  3.7   CL  --  110*   CO2  --  20   BUN  --  36*   CREATININE 2.2* 2.2*   GLUCOSE  --  122*   CALCIUM  --  9.5       Liver work up:  Hepatitis Functional Panel:  Recent Labs     04/04/24 2012   ALKPHOS 26*   ALT 11   AST 30   PROT 5.4*   BILITOT 0.3   BILIDIR <0.2   LABALBU 3.7       Amylase/Lipase/Ammonia:  Recent Labs     04/04/24  1806   AMMONIA 15*       Acute Hepatitis Panel:  No results found for: \"HEPBSAG\", \"HEPCAB\", \"HEPBIGM\", \"HEPAIGM\"         Principal Problem:    GI bleed  Resolved Problems:    * No resolved hospital problems. *       GI Impression:      Acute anemia likely secondary to GI.  Melena for over 1 month.  Concern for upper GI bleed  NSTEMI  Lightheadedness  history of alcohol abuse.  Stopped roughly 1 week ago  Strokelike symptoms with aphasia  CASPER on CKD    Recommendations and plan:    Full plan to be discussed with attending- Dr. Rubio   2. Will need EGD . Awaiting cardiology clearance  3. Continue to trend H + H q4h  4. Continue protonix drip   5. No history of cirrhosis known. No need for octreotide at this time       [facility]  Carlos Tello MD   [phone]  4/5/2024    10:07 AM     Estimated time of  mins reviewing chart, assessing patient and formulating plan of care    This note was created with the assistance of a speech-recognition program.  Although the intention is to generate a document that actually reflects the content of the visit, no guarantees can be provided that every mistake has been identified and corrected by editing.      Attested:    I have discussed the care of Angela Ghotra and I have examined the patient myselft and taken ros and hpi , including pertinent history and exam findings,  with the resident physician. I have reviewed the key elements of all parts of the encounter with the resident.  I agree with the assessment, plan

## 2024-04-05 NOTE — PLAN OF CARE
Problem: Discharge Planning  Goal: Discharge to home or other facility with appropriate resources  4/5/2024 1109 by Mahad Barron, RN  Outcome: Progressing  Flowsheets (Taken 4/5/2024 0800)  Discharge to home or other facility with appropriate resources: Arrange for needed discharge resources and transportation as appropriate  4/5/2024 0601 by Darvin Hayes, RN  Outcome: Progressing     Problem: Safety - Adult  Goal: Free from fall injury  Outcome: Progressing

## 2024-04-05 NOTE — ED NOTES
ED to inpatient nurses report      Chief Complaint:  Chief Complaint   Patient presents with    Shortness of Breath    Chest Pain     Present to ED from: Home    MOA:     LOC: alert and orientated to name, place, date  Mobility: Independent  Oxygen Baseline: RA    Current needs required: 2L O2   Pending ED orders: NA  Present condition: Stable    Why did the patient come to the ED? Shortness of breath for about a week, Black stools.  What is the plan? Pt hgb is low, on 2nd unit RBC now, orders for 3rd are in not released at this time.   Any procedures or intervention occur? Transfusion, GI consult, ECHO  Any safety concerns??Fall Risk    Holding 3rd PRBC at this time, waiting for H&H to see if 3rd is needed.     Mental Status:  Level of Consciousness: Alert (0)    Psych Assessment:   Psychosocial  Psychosocial (WDL): Within Defined Limits  Vital signs   Vitals:    04/04/24 2150 04/04/24 2156 04/05/24 0123 04/05/24 0133   BP: (!) 112/56  (!) 111/58    Pulse:  86 82 78   Resp:  14 19 19   Temp:   99.2 °F (37.3 °C)    TempSrc:       SpO2: 100% 90% 100% 100%   Weight:            Vitals:  Patient Vitals for the past 24 hrs:   BP Temp Temp src Pulse Resp SpO2 Weight   04/05/24 0133 -- -- -- 78 19 100 % --   04/05/24 0123 (!) 111/58 99.2 °F (37.3 °C) -- 82 19 100 % --   04/04/24 2156 -- -- -- 86 14 90 % --   04/04/24 2150 (!) 112/56 -- -- -- -- 100 % --   04/04/24 2105 (!) 121/56 -- -- 88 24 98 % --   04/04/24 2055 (!) 115/59 -- -- 80 24 100 % --   04/04/24 2051 -- 99.2 °F (37.3 °C) -- -- -- -- --   04/04/24 2050 109/62 -- -- 84 23 100 % --   04/04/24 2048 -- -- -- 83 18 100 % --   04/04/24 2045 112/60 99.4 °F (37.4 °C) Oral 86 20 100 % --   04/04/24 2042 -- -- -- 81 22 100 % --   04/04/24 2040 116/65 -- -- 80 17 100 % --   04/04/24 2036 129/70 -- -- 81 20 100 % --   04/04/24 2033 -- 98.9 °F (37.2 °C) -- -- -- -- --   04/04/24 1922 -- -- -- 86 15 100 % --   04/04/24 1752 -- -- -- 93 20 100 % --   04/04/24 1747 112/72 -- --  Hematocrit 14 (*)     All other components within normal limits   TOX SCR, BLD, ED - Abnormal; Notable for the following components:    Acetaminophen Level <5 (*)     All other components within normal limits   TSH WITH REFLEX - Abnormal; Notable for the following components:    TSH 6.06 (*)     All other components within normal limits   AMMONIA - Abnormal; Notable for the following components:    Ammonia 15 (*)     All other components within normal limits   URINE DRUG SCREEN - Abnormal; Notable for the following components:    Cannabinoid Scrn, Ur POSITIVE (*)     All other components within normal limits   URINALYSIS WITH REFLEX TO CULTURE - Abnormal; Notable for the following components:    Ketones, Urine TRACE (*)     All other components within normal limits   TROPONIN - Abnormal; Notable for the following components:    Troponin, High Sensitivity 128 (*)     All other components within normal limits   HEPATIC FUNCTION PANEL - Abnormal; Notable for the following components:    Alkaline Phosphatase 26 (*)     Total Protein 5.4 (*)     All other components within normal limits   TROPONIN - Abnormal; Notable for the following components:    Troponin, High Sensitivity 115 (*)     All other components within normal limits   TROPONIN - Abnormal; Notable for the following components:    Troponin, High Sensitivity 123 (*)     All other components within normal limits   RETICULOCYTES - Abnormal; Notable for the following components:    Retic % 13.8 (*)     Absolute Retic # 0.310 (*)     Immature Retic Fract 49.1 (*)     Retic Hemoglobin 22.7 (*)     All other components within normal limits   HEMOGLOBIN AND HEMATOCRIT - Abnormal; Notable for the following components:    Hemoglobin 5.4 (*)     Hematocrit 17.1 (*)     All other components within normal limits   VENOUS BLOOD GAS, POINT OF CARE - Abnormal; Notable for the following components:    pH, David 7.469 (*)     pCO2, David 28.7 (*)     pO2, David 17.8 (*)     HCO3,  Venous 20.8 (*)     Negative Base Excess, David 2.8 (*)     O2 Sat, David 30.4 (*)     All other components within normal limits   CREATININE W/GFR POINT OF CARE - Abnormal; Notable for the following components:    POC Creatinine 2.2 (*)     All other components within normal limits   UREA N (POC) - Abnormal; Notable for the following components:    POC BUN 37 (*)     All other components within normal limits   LACTIC ACID,POINT OF CARE - Abnormal; Notable for the following components:    POC Lactic Acid 2.2 (*)     All other components within normal limits   POCT GLUCOSE - Abnormal; Notable for the following components:    POC Glucose 118 (*)     All other components within normal limits   CULTURE, BLOOD 1   CULTURE, BLOOD 2   CALCIUM, IONIC (POC)   LACTATE, SEPSIS   LACTATE, SEPSIS   T4, FREE   IRON AND TIBC   LACTATE DEHYDROGENASE   FIBRINOGEN   HEMOGLOBIN AND HEMATOCRIT   HEMOGLOBIN AND HEMATOCRIT   HEMOGLOBIN AND HEMATOCRIT   CBC WITH AUTO DIFFERENTIAL   TROPONIN   HEMOGLOBIN AND HEMATOCRIT   HEMOGLOBIN AND HEMATOCRIT   TYPE AND SCREEN   PREPARE RBC (CROSSMATCH)   PREPARE RBC (CROSSMATCH)       Electronically signed by Madisyn Cordoba RN on 4/5/2024 at 1:36 AM

## 2024-04-05 NOTE — CONSULTS
Attestation signed by      Attending Physician Statement:    I have discussed the care of  Angela Ghotra , including pertinent history and exam findings, with the Cardiology fellow/resident.     I have seen and examined the patient and the key elements of all parts of the encounter have been performed by me. I agree with the assessment, plan and orders as documented by the fellow/resident, after I modified exam findings and plan of treatments, and the final version is my approved version of the assessment.     Additional Comments:   Chest tightness and SOB. Mildly elevated HS troponin. Type 2. Nonspecific ST changes. Severe Anemia. Type 2 MI. Blood transfusion and GI consult. Echo. Patient has alcohol and drug abuse history. Will consider medical therapy.           [facility]    Consult / H&P               Today's Date: 4/5/2024  Patient Name: Angela Ghotra  Date of admission: 4/4/2024  5:11 PM  Patient's age: 54 y.o., 1970  Admission Dx: Unstable angina pectoris (HCC) [I20.0]  GI bleed [K92.2]  NSTEMI (non-ST elevated myocardial infarction) (HCC) [I21.4]  Gastrointestinal hemorrhage, unspecified gastrointestinal hemorrhage type [K92.2]  Anemia, unspecified type [D64.9]    Reason for Consult:  Cardiac evaluation    Requesting Physician: Fly Anne MD    CHIEF COMPLAINT:  Chest pain, SOB, dizziness, hematochezia    Reason for consult - Elevated troponin    History Obtained From:  patient, electronic medical record    HISTORY OF PRESENT ILLNESS:      The patient is a 54 y.o.  male who is admitted to the hospital for chest pain, shortness of breath, dizziness and hematochezia.  The patient reports that he has been having progressively worsening dyspnea since last few days/weeks associated with dizziness.  His symptoms got worse in the last week associated with nausea and vomiting.  He also noticed dark stools and epigastric pain in the last 1 week.  Prior to arrival to the ER for  358  --   --      BMP:   Recent Labs     04/04/24  1735 04/04/24  1741   NA  --  144   K  --  3.7   CO2  --  20   BUN  --  36*   CREATININE 2.2* 2.2*   LABGLOM  --  35*   GLUCOSE  --  122*     BNP: No results for input(s): \"BNP\" in the last 72 hours.  PT/INR:   Recent Labs     04/04/24 1741   PROTIME 13.7   INR 1.1     APTT:  Recent Labs     04/04/24 1741   APTT 24.5     CARDIAC ENZYMES:  Recent Labs     04/04/24 1741   CKTOTAL 200     FASTING LIPID PANEL:No results found for: \"HDL\", \"LDLDIRECT\", \"LDLCALC\", \"TRIG\"  LIVER PROFILE:  Recent Labs     04/04/24 2012   AST 30   ALT 11   LABALBU 3.7       IMPRESSION:    Patient Active Problem List   Diagnosis    Chest pain    GI bleed     Elevated troponin, likely type II in the setting of severe anemia   Chest pain and dyspnea  GI bleed   Acute blood loss anemia with Hb 4.9, requiring BT   Drug abuse   Alcohol use    RECOMMENDATIONS:  Elevated troponin is likely type II in the setting of severe anemia requiring blood transfusion, will hold off ischemia workup with CAD/stress at this time.  However, we will obtain 2D echocardiogram for evaluation of EF/WMA's and for structural/valvular heart disease.  Follow-up GI recommendations regarding workup of GI bleed.  We will continue to follow.      Nely Jacques MD  Fellow, Cardiovascular Diseases    [facility]

## 2024-04-05 NOTE — PROGRESS NOTES
Critical care team - Resident sign-out to medicine service      Date and time: 4/5/2024 1:01 AM  Patient's name:  Angela Ghotra  Medical Record Number: [id number]  Patient's account/billing number: [id number]  Patient's YOB: 1970  Age: 54 y.o.  Date of Admission: 4/4/2024  5:11 PM  Length of stay during current admission: 1    Primary Care Physician: No primary care provider on file.    Code Status: Full Code    Mode of physician to physician communication:        [x] Via telephone   [] In person     Date and time of sign-out: 4/5/2024 1:01 AM    Accepting Internal Medicine resident: Dr. Beavers    Accepting Medicine team: IM Team intermed    Accepting team's attending: Dr. Shaw        Patient's assigned bed on floor:  Stepdown unit        [] Med-Surg Monitored [x] Step-down       [] Psychiatry ICU       [] Psych floor     Reason for ICU admission:   GI bleed    ICU course summary:   Angela Ghotra is a 54 y.o. with past medical history significant for recent positive stress test in February 2023 presented himself to the emergency room with a chief complaint of weakness, dizziness, GI bleed and black melanotic stool for last 1 month.  Patient is homeless and recently went to a free outpatient clinic and was diagnosed with URI and was given some cough drops.  Subsequently yesterday patient felt increased weakness and dizziness and presented herself to the emergency room where hemoglobin was found to be critically low at 4.9.  Troponins were uptrending.  Troponins 128.  EKG showed inferolateral ST depression consistent with NSTEMI.  During the ER evaluation patient had left-sided weakness and dysarthria.  Stroke alert was called.  All the stroke workup including MRI neck, MRI, CT head was unremarkable.  Patient required MICU admission for overnight monitoring of the hemoglobin.    After 1 unit of packed RBC transfusion hemoglobin 5.4.  Patient is ready to be transferred from ER 22 to stepdown unit.

## 2024-04-05 NOTE — PROGRESS NOTES
[facility]     Emergency/Trauma Note    PATIENT NAME: Angela Ghotra    Shift date: 4/4/2024  Shift day: Wednesday   Shift # 2    Room # 22/22   Name: Angela Ghotra            Age: 54 y.o.  Gender: male          Faith: None   Place of Tenriism:     Trauma/Incident type: Stroke Alert  Admit Date & Time: 4/4/2024  5:11 PM  TRAUMA NAME: N/A    ADVANCE DIRECTIVES IN CHART?  No    NAME OF DECISION MAKER: N/A    RELATIONSHIP OF DECISION MAKER TO PATIENT: N/A    PATIENT/EVENT DESCRIPTION:  Angela Ghotra is a 54 y.o. male who arrived at [facility].  responded to perfect serve for trauma consult. Pt to be admitted to 22/22.         SPIRITUAL ASSESSMENT-INTERVENTION-OUTCOME:   was a ministry of presence to patient and medical staff. Upon returning, writer introduced self as . Patient did not appear to mind  presence and engaged in conversation. Patient appeared to have a difficult time forming words. With RN in room, patient wanted to have his sister Angela Ghotra contacted. Sister was in patient's emergency contact list.  provided a supportive presence to patient allowing space for feelings and emotions. Sister's contact information was given to resident doctor.     PATIENT BELONGINGS:  Writer witnessed patient belongings (book bag, coat) in patient's room.    ANY BELONGINGS OF SIGNIFICANT VALUE NOTED:  N/A    REGISTRATION STAFF NOTIFIED?  Yes      WHAT IS YOUR SPIRITUAL CARE PLAN FOR THIS PATIENT?:   Chaplains will remain available to offer spiritual and emotional support as needed.    Electronically signed by Chaplain Sen, on 4/4/2024 at 9:43 PM.  [facility]  [phone]

## 2024-04-05 NOTE — CONSENT
Informed Consent for Blood Component Transfusion Note    I have discussed with the patient the rationale for blood component transfusion; its benefits in treating or preventing fatigue, organ damage, or death; and its risk which includes mild transfusion reactions, rare risk of blood borne infection, or more serious but rare reactions. I have discussed the alternatives to transfusion, including the risk and consequences of not receiving transfusion. The patient had an opportunity to ask questions and had agreed to proceed with transfusion of blood components.    Electronically signed by Viky Osborne MD on 4/5/24 at 7:28 AM EDT

## 2024-04-05 NOTE — CARE COORDINATION
Case Management Assessment  Initial Evaluation    Date/Time of Evaluation: 4/5/2024 5:15 PM  Assessment Completed by: Leeanna Restrepo RN    If patient is discharged prior to next notation, then this note serves as note for discharge by case management.    Patient Name: Angela Ghotra                   YOB: 1970  Diagnosis: Unstable angina pectoris (HCC) [I20.0]  GI bleed [K92.2]  NSTEMI (non-ST elevated myocardial infarction) (HCC) [I21.4]  Gastrointestinal hemorrhage, unspecified gastrointestinal hemorrhage type [K92.2]  Anemia, unspecified type [D64.9]                   Date / Time: 4/4/2024  5:11 PM    Patient Admission Status: Inpatient   Readmission Risk (Low < 19, Mod (19-27), High > 27): Readmission Risk Score: 16.8    Current PCP: No primary care provider on file.  PCP verified by CM? (P) No    Chart Reviewed: Yes      History Provided by: Patient  Patient Orientation: Alert and Oriented    Patient Cognition: Alert    Hospitalization in the last 30 days (Readmission):  No    If yes, Readmission Assessment in CM Navigator will be completed.    Advance Directives:      Code Status: Full Code   Patient's Primary Decision Maker is: Legal Next of Kin      Discharge Planning:    Patient lives with: (P) Other (Comment) Type of Home: (P) Shelter  Primary Care Giver: Self  Patient Support Systems include: Children   Current Financial resources:    Current community resources:    Current services prior to admission: (P) None            Current DME:              Type of Home Care services:  (P) None    ADLS  Prior functional level: (P) Independent in ADLs/IADLs  Current functional level: (P) Independent in ADLs/IADLs    PT AM-PAC:   /24  OT AM-PAC:   /24    Family can provide assistance at DC: (P) No  Would you like Case Management to discuss the discharge plan with any other family members/significant others, and if so, who? (P) No  Plans to Return to Present Housing: (P) Yes  Other Identified  Issues/Barriers to RETURNING to current housing: none  Potential Assistance needed at discharge: (P) Transportation            Potential DME:    Patient expects to discharge to: (P) Shelter  Plan for transportation at discharge:      Financial    Payor: /     Does insurance require precert for SNF:     Potential assistance Purchasing Medications:    Meds-to-Beds request: Yes      [facility] [id number] - [place], [place] - [address] - P [phone] - F [phone]  [address]  [place] [address]  Phone: [phone] Fax: [phone]    [facility] - [facility] [place] - [address] - P [phone] - F [phone]  [address] [facility] [place] [address]  Phone: [phone] Fax: [phone]      Notes:    Factors facilitating achievement of predicted outcomes: Family support, Cooperative, and Pleasant    Barriers to discharge: Medical complications    Additional Case Management Notes: patient returning to [facility]. He will need transportation. He denies other needs    The Plan for Transition of Care is related to the following treatment goals of Unstable angina pectoris (HCC) [I20.0]  GI bleed [K92.2]  NSTEMI (non-ST elevated myocardial infarction) (HCC) [I21.4]  Gastrointestinal hemorrhage, unspecified gastrointestinal hemorrhage type [K92.2]  Anemia, unspecified type [D64.9]    The Patient and/or Patient Representative Agree with the Discharge Plan?  yes    Leeanna Restrepo RN  Case Management Department  Ph: [phone] Fax: [phone]

## 2024-04-05 NOTE — CARE COORDINATION
Consult received for consideration of rehab.  Met with pt who stated he has been staying at [facility] since Nov and plans to return at discharge. Stated he is from [place]. Ins thru [facility].  Pt stated he has been drinking alcohol daily, two 24oz beers. Stated his last drink was Sat or Sun. He reports daily marijuana use. He denies any other drug use.  Pt admits he has been concerned about his drinking. He reports he went to tx in 2009 at [facility]. Stated he attended at few AA meetings but  did not have any sobriety.  Pt reports he wants to quit drinking. He stated he is currently linked with [facility] for tx and was supposed to start group this week but has been sick. Stated he has already contacted them that he is here and he will return to [facility] once discharged. He stated they were also helping him with housing. Pt declined the need for any further resources.   Advised pt that SW will contact [facility] to confirm he is here - pt agreeable. Called and spoke with Nicole. She stated pt will need to bring his discharge paperwork with him when he returns. She did not anticipate any issues with him returning.

## 2024-04-06 LAB
ABO/RH: NORMAL
ANION GAP SERPL CALCULATED.3IONS-SCNC: 10 MMOL/L (ref 9–16)
ANTIBODY SCREEN: NEGATIVE
ARM BAND NUMBER: NORMAL
BASOPHILS # BLD: 0.03 K/UL (ref 0–0.2)
BASOPHILS NFR BLD: 0 % (ref 0–2)
BLOOD BANK BLOOD PRODUCT EXPIRATION DATE: NORMAL
BLOOD BANK DISPENSE STATUS: NORMAL
BLOOD BANK ISBT PRODUCT BLOOD TYPE: 600
BLOOD BANK ISBT PRODUCT BLOOD TYPE: 6200
BLOOD BANK PRODUCT CODE: NORMAL
BLOOD BANK SAMPLE EXPIRATION: NORMAL
BLOOD BANK UNIT TYPE AND RH: NORMAL
BPU ID: NORMAL
BUN SERPL-MCNC: 16 MG/DL (ref 6–20)
CALCIUM SERPL-MCNC: 8.7 MG/DL (ref 8.6–10.4)
CHLORIDE SERPL-SCNC: 108 MMOL/L (ref 98–107)
CO2 SERPL-SCNC: 21 MMOL/L (ref 20–31)
COMPONENT: NORMAL
CREAT SERPL-MCNC: 1.9 MG/DL (ref 0.7–1.2)
CROSSMATCH RESULT: NORMAL
EOSINOPHIL # BLD: 0.18 K/UL (ref 0–0.44)
EOSINOPHILS RELATIVE PERCENT: 2 % (ref 1–4)
ERYTHROCYTE [DISTWIDTH] IN BLOOD BY AUTOMATED COUNT: 18.6 % (ref 11.8–14.4)
GFR SERPL CREATININE-BSD FRML MDRD: 41 ML/MIN/1.73M2
GLUCOSE SERPL-MCNC: 89 MG/DL (ref 74–99)
HCT VFR BLD AUTO: 32.3 % (ref 40.7–50.3)
HCT VFR BLD AUTO: 32.9 % (ref 40.7–50.3)
HCT VFR BLD AUTO: 33.1 % (ref 40.7–50.3)
HCT VFR BLD AUTO: 35.9 % (ref 40.7–50.3)
HGB BLD-MCNC: 10.3 G/DL (ref 13–17)
HGB BLD-MCNC: 10.6 G/DL (ref 13–17)
HGB BLD-MCNC: 10.7 G/DL (ref 13–17)
HGB BLD-MCNC: 11.7 G/DL (ref 13–17)
IMM GRANULOCYTES # BLD AUTO: 0.05 K/UL (ref 0–0.3)
IMM GRANULOCYTES NFR BLD: 1 %
LYMPHOCYTES NFR BLD: 1.39 K/UL (ref 1.1–3.7)
LYMPHOCYTES RELATIVE PERCENT: 17 % (ref 24–43)
MCH RBC QN AUTO: 27.2 PG (ref 25.2–33.5)
MCHC RBC AUTO-ENTMCNC: 32.8 G/DL (ref 28.4–34.8)
MCV RBC AUTO: 82.8 FL (ref 82.6–102.9)
MONOCYTES NFR BLD: 0.86 K/UL (ref 0.1–1.2)
MONOCYTES NFR BLD: 10 % (ref 3–12)
NEUTROPHILS NFR BLD: 70 % (ref 36–65)
NEUTS SEG NFR BLD: 5.83 K/UL (ref 1.5–8.1)
NRBC BLD-RTO: 1.7 PER 100 WBC
PLATELET # BLD AUTO: 156 K/UL (ref 138–453)
PMV BLD AUTO: 10.2 FL (ref 8.1–13.5)
POTASSIUM SERPL-SCNC: 4.2 MMOL/L (ref 3.7–5.3)
RBC # BLD AUTO: 3.9 M/UL (ref 4.21–5.77)
RBC # BLD: ABNORMAL 10*6/UL
SODIUM SERPL-SCNC: 139 MMOL/L (ref 136–145)
TRANSFUSION STATUS: NORMAL
UNIT DIVISION: 0
UNIT ISSUE DATE/TIME: NORMAL
WBC OTHER # BLD: 8.3 K/UL (ref 3.5–11.3)

## 2024-04-06 PROCEDURE — 2060000000 HC ICU INTERMEDIATE R&B

## 2024-04-06 PROCEDURE — 6370000000 HC RX 637 (ALT 250 FOR IP): Performed by: INTERNAL MEDICINE

## 2024-04-06 PROCEDURE — 99233 SBSQ HOSP IP/OBS HIGH 50: CPT | Performed by: SURGERY

## 2024-04-06 PROCEDURE — 85014 HEMATOCRIT: CPT

## 2024-04-06 PROCEDURE — C9113 INJ PANTOPRAZOLE SODIUM, VIA: HCPCS

## 2024-04-06 PROCEDURE — 6360000002 HC RX W HCPCS

## 2024-04-06 PROCEDURE — 99231 SBSQ HOSP IP/OBS SF/LOW 25: CPT | Performed by: INTERNAL MEDICINE

## 2024-04-06 PROCEDURE — 2580000003 HC RX 258: Performed by: INTERNAL MEDICINE

## 2024-04-06 PROCEDURE — 2580000003 HC RX 258

## 2024-04-06 PROCEDURE — 85025 COMPLETE CBC W/AUTO DIFF WBC: CPT

## 2024-04-06 PROCEDURE — 36415 COLL VENOUS BLD VENIPUNCTURE: CPT

## 2024-04-06 PROCEDURE — 85018 HEMOGLOBIN: CPT

## 2024-04-06 PROCEDURE — 80048 BASIC METABOLIC PNL TOTAL CA: CPT

## 2024-04-06 PROCEDURE — 99232 SBSQ HOSP IP/OBS MODERATE 35: CPT | Performed by: HOSPITALIST

## 2024-04-06 RX ADMIN — Medication 100 MG: at 08:39

## 2024-04-06 RX ADMIN — SODIUM CHLORIDE, POTASSIUM CHLORIDE, SODIUM LACTATE AND CALCIUM CHLORIDE: 600; 310; 30; 20 INJECTION, SOLUTION INTRAVENOUS at 20:04

## 2024-04-06 RX ADMIN — METOPROLOL TARTRATE 25 MG: 25 TABLET, FILM COATED ORAL at 20:04

## 2024-04-06 RX ADMIN — METOPROLOL TARTRATE 25 MG: 25 TABLET, FILM COATED ORAL at 08:39

## 2024-04-06 RX ADMIN — SODIUM CHLORIDE, PRESERVATIVE FREE 10 ML: 5 INJECTION INTRAVENOUS at 08:39

## 2024-04-06 RX ADMIN — ATORVASTATIN CALCIUM 40 MG: 40 TABLET, FILM COATED ORAL at 20:04

## 2024-04-06 RX ADMIN — SODIUM CHLORIDE, POTASSIUM CHLORIDE, SODIUM LACTATE AND CALCIUM CHLORIDE: 600; 310; 30; 20 INJECTION, SOLUTION INTRAVENOUS at 04:17

## 2024-04-06 RX ADMIN — PANTOPRAZOLE SODIUM 8 MG/HR: 40 INJECTION, POWDER, FOR SOLUTION INTRAVENOUS at 04:16

## 2024-04-06 NOTE — PROGRESS NOTES
[facility]  Progress Note                   Date:   4/6/2024  Patient name: Angela Ghotra  Date of admission:  4/4/2024  5:11 PM  MRN:   [id number]  YOB: 1970  PCP: No primary care provider on file.    Reason for Admission: Unstable angina pectoris (HCC) [I20.0]  GI bleed [K92.2]  NSTEMI (non-ST elevated myocardial infarction) (HCC) [I21.4]  Gastrointestinal hemorrhage, unspecified gastrointestinal hemorrhage type [K92.2]  Anemia, unspecified type [D64.9]    Subjective:       Clinical Changes /Abnormalities:Patient seen and examined. Denies chest pain or shortness of breath. Tele/vitals/labs reviewed     Review of Systems    Medications:   Scheduled Meds:   sodium chloride flush  5-40 mL IntraVENous 2 times per day    thiamine  100 mg Oral Daily    atorvastatin  40 mg Oral Nightly    metoprolol tartrate  25 mg Oral BID    [START ON 4/7/2024] pantoprazole (PROTONIX) 40 mg in sodium chloride (PF) 0.9 % 10 mL injection  40 mg IntraVENous Daily    sodium chloride flush  5-40 mL IntraVENous 2 times per day     Continuous Infusions:   sodium chloride      sodium chloride      sodium chloride      sodium chloride      pantoprazole 8 mg/hr (04/06/24 0416)    sodium chloride      sodium chloride      lactated ringers IV soln 125 mL/hr at 04/06/24 0417     CBC:   Recent Labs     04/04/24  1741 04/05/24  0027 04/05/24  0330 04/05/24  1854 04/06/24  0100   WBC 12.2*  --   --   --   --    HGB 4.9*   < > 6.4* 10.4* 10.3*     --   --   --   --     < > = values in this interval not displayed.     BMP:    Recent Labs     04/04/24  1735 04/04/24  1741   NA  --  144   K  --  3.7   CL  --  110*   CO2  --  20   BUN  --  36*   CREATININE 2.2* 2.2*   GLUCOSE  --  122*     Hepatic:  Recent Labs     04/04/24 2012   AST 30   ALT 11   BILITOT 0.3   ALKPHOS 26*     Troponin:   Recent Labs     04/04/24  2255 04/05/24  0025 04/05/24  0257   TROPHS 115* 123* 139*     BNP: No results for input(s): \"BNP\" in

## 2024-04-06 NOTE — PROGRESS NOTES
[facility]  Office: [phone]  Keny Nolan DO, Eliseo Corey DO, Jordan Love DO, Mina Pérez DO, Lela Pritchett MD, Muna Jaquez MD, Maisha Altamirano MD, Nicole Saunders MD,  Raheem Shaw MD, Sam Kern MD, Marilee Bautista MD,  Jacob Dinero DO, Corina Jaime MD, Armani Lee MD, Camden Nolan DO, Viky Osborne MD,  Carlos Macario DO, Eunice Cohn MD, Jyotsna Anne MD, Glenis Yip MD, Robert Lu MD,  Roshan Baker MD, Britta Stock MD, Nikos Ordaz MD, Jeffery Cox MD, Nathanael Cardoso MD, Joanie Newsome MD, Earl Chicas DO, David Causey DO, Maynor Kwok MD,  Addison Chand MD, Shirley Waterhouse, CNP,  Chelle Chan CNP, Mikel Wyatt, CNP,  Liliana Mojica, JAVIER, Karli Calle, CNP, Caitie Donahue, CNP, Phyllis Meade CNP, Dorothea Kevin, CNP, Lela Huitron, PA-C, Amy Jones PA-C, Latoya Gasca, CNP, Eri Fenton, CNP, Ofelia Whitley, CNP, Francheska Castro, CNP, Ebony Scott, CNP, Louise Terrazas, CNS, Jacqueline Owens, CNP, Lesvia Copeland CNP, Tracy Schwab, CNP         [facility]   IN-PATIENT SERVICE   [facility]    Progress Note    4/6/2024    10:43 AM    Name:   Angela Ghotra  MRN:     [id number]     Acct:      [id number]   Room:   2018/2018-01  IP Day:  2  Admit Date:  4/4/2024  5:11 PM    PCP:   No primary care provider on file.  Code Status:  Full Code    Subjective:     Patient seen in follow-up for symptomatic anemia, patient states \"I feel better\"    Patient is feeling well overall.  His biggest complaint is that of his clear liquid diet.  I do long discussion with him and explained to him that given his presenting symptomology of symptomatic anemia and melena he likely has peptic ulcer disease and will ultimately require an EGD.  At this point in time it is nonemergent and I anticipate EGD Monday.  The patient did have a minimally elevated troponin on admission however his echocardiogram demonstrates preserved  LIMITED BRAIN    Result Date: 4/4/2024  1. No acute intracranial abnormality. 2. Unremarkable MRA of the head and neck.     MRA HEAD WO CONTRAST    Result Date: 4/4/2024  1. No acute intracranial abnormality. 2. Unremarkable MRA of the head and neck.     MRA NECK WO CONTRAST    Result Date: 4/4/2024  1. No acute intracranial abnormality. 2. Unremarkable MRA of the head and neck.     CT HEAD WO CONTRAST    Addendum Date: 4/4/2024    ADDENDUM: Findings were discussed with Dr. Aleksey Lindo at 5:03 pm on 4/4/2024.     Result Date: 4/4/2024  No acute intracranial abnormality.       Physical Examination:     General appearance:  alert, cooperative and no distress  Mental Status:  oriented to person, place and time and normal affect  Lungs:  clear to auscultation bilaterally, normal effort  Heart:  regular rate and rhythm, no murmur  Abdomen:  soft, nontender, nondistended, normal bowel sounds, no masses, hepatomegaly, splenomegaly  Extremities:  no edema, redness, tenderness in the calves  Skin:  no gross lesions, rashes, induration    Assessment:     Hospital Problems             Last Modified POA    * (Principal) GI bleed 4/4/2024 Yes    NSTEMI (non-ST elevated myocardial infarction) (HCC) 4/5/2024 Yes       Plan:     Symptomatic anemia/melena/clinical peptic ulcer disease  Status post transfusion  Continue PPI  EGD at the discretion of gastroenterology  Continue transfusional support  Elevated troponin, clinically demand ischemia secondary to symptomatic anemia  Echocardiogram demonstrates preserved ejection fraction  Likely type II myocardial infarction secondary to symptomatic anemia  Cardiology input noted, no ischemic workup at this point in time  Alcoholism  Monitor for withdrawal phenomenon  Likely etiology of presumed peptic ulcer disease  Continue thiamine  Acute on chronic kidney disease/stage IIIb chronic kidney disease  Renal function essentially at baseline, avoid nephrotoxic agents    Await timing of EGD  per GI    Medical Decision Making: Duncan Nolan DO  4/6/2024  10:43 AM

## 2024-04-06 NOTE — PLAN OF CARE
Problem: Discharge Planning  Goal: Discharge to home or other facility with appropriate resources  4/6/2024 1900 by Sunita Mccray RN  Outcome: Progressing  4/6/2024 0909 by Art Rojo RN  Outcome: Progressing  4/6/2024 0515 by Heidi Galeas RN  Outcome: Progressing     Problem: Safety - Adult  Goal: Free from fall injury  4/6/2024 1900 by Sunita Mccray RN  Outcome: Progressing  4/6/2024 0909 by Art Rojo RN  Outcome: Progressing  4/6/2024 0515 by Heidi Galeas RN  Outcome: Progressing     Problem: ABCDS Injury Assessment  Goal: Absence of physical injury  4/6/2024 1900 by Sunita Mccray RN  Outcome: Progressing  4/6/2024 0909 by Art Rojo RN  Outcome: Progressing  4/6/2024 0515 by Heidi Galeas RN  Outcome: Progressing

## 2024-04-06 NOTE — PROGRESS NOTES
[facility] Gastroenterology Progress Note    Angela Ghotra is a 54 y.o. male patient.  Hospitalization Day:2      Chief consult reason:   Melena     Subjective:  No new complaints. States his stool was dark brown overnight. No active abdominal pain. No episodes of hematemesis/coffee ground emesis.     Planning for EGD for Monday. Discussed this with patient.     Objective:   VITALS:  /82   Pulse 53   Temp 97.7 °F (36.5 °C) (Oral)   Resp 14   Ht 1.7 m (5' 6.93\")   Wt 51.3 kg (113 lb)   SpO2 98%   BMI 17.74 kg/m²   TEMPERATURE:  Current - Temp: 97.7 °F (36.5 °C); Max - Temp  Av.3 °F (36.8 °C)  Min: 97.7 °F (36.5 °C)  Max: 98.8 °F (37.1 °C)    Physical Exam  Constitutional:       General: He is not in acute distress.     Appearance: Normal appearance. He is not ill-appearing.   HENT:      Head: Normocephalic and atraumatic.   Eyes:      General: No scleral icterus.        Right eye: No discharge.         Left eye: No discharge.   Cardiovascular:      Rate and Rhythm: Normal rate and regular rhythm.      Pulses: Normal pulses.      Heart sounds: No murmur heard.  Pulmonary:      Effort: Pulmonary effort is normal. No respiratory distress.      Breath sounds: Normal breath sounds. No wheezing or rales.   Abdominal:      General: Bowel sounds are normal. There is no distension.      Tenderness: There is no abdominal tenderness. There is no guarding.   Musculoskeletal:      Right lower leg: No edema.      Left lower leg: No edema.   Skin:     Capillary Refill: Capillary refill takes less than 2 seconds.      Coloration: Skin is not jaundiced.      Findings: No lesion.   Neurological:      General: No focal deficit present.      Mental Status: He is alert and oriented to person, place, and time.      Cranial Nerves: No cranial nerve deficit.      Sensory: No sensory deficit.      Motor: No weakness.      CURRENT MEDICATIONS:  Scheduled Meds:   sodium chloride flush  5-40 mL IntraVENous 2 times per    ALT 11   AST 30   BILITOT 0.3   BILIDIR <0.2   LABALBU 3.7       AMYLASE/LIPASE/AMMONIA  Recent Labs     04/04/24  1806   AMMONIA 15*       Acute Hepatitis Panel   No results found for: \"HEPBSAG\", \"HEPCAB\", \"HEPBIGM\", \"HEPAIGM\"    HCV Genotype   No components found for: \"HEPATITISCGENOTYPE\"    HCV Quantitative   No results found for: \"HCVQNT\"    LIVER WORK UP:    AFP  No results found for: \"AFP\"    Alpha 1 antitrypsin   No results found for: \"A1A\"    Anti - Liver/Kidney Ab  No results found for: \"LIVER-KIDNEYMICROSOMALAB\"    CONTRERAS  No results found for: \"CONTRERAS\"    AMA  No results found for: \"MITOAB\"    ASMA  No results found for: \"SMOOTHMUSCAB\"    Ceruloplasmin  No results found for: \"CERULOPLSM\"    Celiac panel  No results found for: \"TISSTRNTIIGG\", \"TTGIGA\", \"IGA\"    IgG  No results found for: \"IGG\"    IgM  No results found for: \"IGM\"    GGT   No results found for: \"LABGGT\"    PT/INR  Recent Labs     04/04/24  1741   PROTIME 13.7   INR 1.1       Cancer Markers:  CEA:  No results found for: \"CEA\"  Ca 125:  No results found for: \"\"  Ca 19-9:   No results found for: \"\"  AFP: No results found for: \"AFP\"    Lactic acid:No results for input(s): \"LACTACIDWB\" in the last 72 hours.      Radiology Review:      MRI LIMITED BRAIN    Result Date: 4/4/2024  1. No acute intracranial abnormality. 2. Unremarkable MRA of the head and neck.     MRA HEAD WO CONTRAST    Result Date: 4/4/2024  1. No acute intracranial abnormality. 2. Unremarkable MRA of the head and neck.     MRA NECK WO CONTRAST    Result Date: 4/4/2024  1. No acute intracranial abnormality. 2. Unremarkable MRA of the head and neck.     CT HEAD WO CONTRAST    Addendum Date: 4/4/2024    ADDENDUM: Findings were discussed with Dr. Aleksey Lindo at 5:03 pm on 4/4/2024.     Result Date: 4/4/2024  No acute intracranial abnormality.         ENDOSCOPY     Principal Problem:    GI bleed  Active Problems:    NSTEMI (non-ST elevated myocardial infarction) (HCC)  Resolved

## 2024-04-06 NOTE — PLAN OF CARE
Problem: Safety - Adult  Goal: Free from fall injury  4/6/2024 0909 by Art Rojo RN  Outcome: Progressing  4/6/2024 0515 by Heidi Galeas RN  Outcome: Progressing     Problem: ABCDS Injury Assessment  Goal: Absence of physical injury  4/6/2024 0909 by Art Rojo RN  Outcome: Progressing  4/6/2024 0515 by Heidi Galeas RN  Outcome: Progressing     Problem: Discharge Planning  Goal: Discharge to home or other facility with appropriate resources  4/6/2024 0909 by Art Rojo RN  Outcome: Progressing  4/6/2024 0515 by Heidi Galeas RN  Outcome: Progressing

## 2024-04-07 ENCOUNTER — ANESTHESIA EVENT (OUTPATIENT)
Dept: OPERATING ROOM | Age: 54
DRG: 811 | End: 2024-04-07
Payer: COMMERCIAL

## 2024-04-07 LAB
BASOPHILS # BLD: <0.03 K/UL (ref 0–0.2)
BASOPHILS NFR BLD: 0 % (ref 0–2)
EOSINOPHIL # BLD: 0.17 K/UL (ref 0–0.44)
EOSINOPHILS RELATIVE PERCENT: 3 % (ref 1–4)
ERYTHROCYTE [DISTWIDTH] IN BLOOD BY AUTOMATED COUNT: 18.7 % (ref 11.8–14.4)
HCT VFR BLD AUTO: 30.2 % (ref 40.7–50.3)
HCT VFR BLD AUTO: 32.9 % (ref 40.7–50.3)
HCT VFR BLD AUTO: 36 % (ref 40.7–50.3)
HCT VFR BLD AUTO: 36.3 % (ref 40.7–50.3)
HGB BLD-MCNC: 10 G/DL (ref 13–17)
HGB BLD-MCNC: 10.3 G/DL (ref 13–17)
HGB BLD-MCNC: 11.5 G/DL (ref 13–17)
HGB BLD-MCNC: 11.6 G/DL (ref 13–17)
IMM GRANULOCYTES # BLD AUTO: <0.03 K/UL (ref 0–0.3)
IMM GRANULOCYTES NFR BLD: 0 %
LYMPHOCYTES NFR BLD: 0.91 K/UL (ref 1.1–3.7)
LYMPHOCYTES RELATIVE PERCENT: 15 % (ref 24–43)
MCH RBC QN AUTO: 26.9 PG (ref 25.2–33.5)
MCHC RBC AUTO-ENTMCNC: 31.3 G/DL (ref 28.4–34.8)
MCV RBC AUTO: 85.9 FL (ref 82.6–102.9)
MONOCYTES NFR BLD: 0.67 K/UL (ref 0.1–1.2)
MONOCYTES NFR BLD: 11 % (ref 3–12)
NEUTROPHILS NFR BLD: 71 % (ref 36–65)
NEUTS SEG NFR BLD: 4.5 K/UL (ref 1.5–8.1)
NRBC BLD-RTO: 0.6 PER 100 WBC
PLATELET # BLD AUTO: 139 K/UL (ref 138–453)
PMV BLD AUTO: 10 FL (ref 8.1–13.5)
RBC # BLD AUTO: 3.83 M/UL (ref 4.21–5.77)
RBC # BLD: ABNORMAL 10*6/UL
WBC OTHER # BLD: 6.3 K/UL (ref 3.5–11.3)

## 2024-04-07 PROCEDURE — 6360000002 HC RX W HCPCS

## 2024-04-07 PROCEDURE — 99231 SBSQ HOSP IP/OBS SF/LOW 25: CPT | Performed by: INTERNAL MEDICINE

## 2024-04-07 PROCEDURE — C9113 INJ PANTOPRAZOLE SODIUM, VIA: HCPCS

## 2024-04-07 PROCEDURE — 6370000000 HC RX 637 (ALT 250 FOR IP): Performed by: INTERNAL MEDICINE

## 2024-04-07 PROCEDURE — C9113 INJ PANTOPRAZOLE SODIUM, VIA: HCPCS | Performed by: NURSE PRACTITIONER

## 2024-04-07 PROCEDURE — 36415 COLL VENOUS BLD VENIPUNCTURE: CPT

## 2024-04-07 PROCEDURE — 85014 HEMATOCRIT: CPT

## 2024-04-07 PROCEDURE — 2580000003 HC RX 258

## 2024-04-07 PROCEDURE — 2580000003 HC RX 258: Performed by: NURSE PRACTITIONER

## 2024-04-07 PROCEDURE — 85025 COMPLETE CBC W/AUTO DIFF WBC: CPT

## 2024-04-07 PROCEDURE — 99232 SBSQ HOSP IP/OBS MODERATE 35: CPT | Performed by: HOSPITALIST

## 2024-04-07 PROCEDURE — 2060000000 HC ICU INTERMEDIATE R&B

## 2024-04-07 PROCEDURE — APPNB30 APP NON BILLABLE TIME 0-30 MINS: Performed by: NURSE PRACTITIONER

## 2024-04-07 PROCEDURE — 85018 HEMOGLOBIN: CPT

## 2024-04-07 PROCEDURE — 2580000003 HC RX 258: Performed by: INTERNAL MEDICINE

## 2024-04-07 PROCEDURE — 6360000002 HC RX W HCPCS: Performed by: NURSE PRACTITIONER

## 2024-04-07 RX ADMIN — PANTOPRAZOLE SODIUM 8 MG/HR: 40 INJECTION, POWDER, FOR SOLUTION INTRAVENOUS at 01:28

## 2024-04-07 RX ADMIN — PANTOPRAZOLE SODIUM 8 MG/HR: 40 INJECTION, POWDER, FOR SOLUTION INTRAVENOUS at 13:49

## 2024-04-07 RX ADMIN — SODIUM CHLORIDE, POTASSIUM CHLORIDE, SODIUM LACTATE AND CALCIUM CHLORIDE: 600; 310; 30; 20 INJECTION, SOLUTION INTRAVENOUS at 04:09

## 2024-04-07 RX ADMIN — ATORVASTATIN CALCIUM 40 MG: 40 TABLET, FILM COATED ORAL at 20:21

## 2024-04-07 RX ADMIN — SODIUM CHLORIDE, PRESERVATIVE FREE 10 ML: 5 INJECTION INTRAVENOUS at 09:58

## 2024-04-07 RX ADMIN — Medication 100 MG: at 09:57

## 2024-04-07 RX ADMIN — METOPROLOL TARTRATE 25 MG: 25 TABLET, FILM COATED ORAL at 20:21

## 2024-04-07 RX ADMIN — METOPROLOL TARTRATE 25 MG: 25 TABLET, FILM COATED ORAL at 09:57

## 2024-04-07 NOTE — PROGRESS NOTES
[facility]  Office: [phone]  Keny Nolan DO, Eliseo Corey DO, Jordan Love DO, Mina Pérez DO, Lela Pritchett MD, Muna Jaquez MD, Maisha Altamirano MD, Nicole Saunders MD,  Raheem Shaw MD, Sam Kern MD, Marilee Bautista MD,  Jacob Dinero DO, Corina Jaime MD, Armani Lee MD, Camden Nolan DO, Viky Osborne MD,  Carlos Macario DO, Eunice Cohn MD, Jyotsna Anne MD, Glenis Yip MD, Robert Lu MD,  Roshan Baker MD, Britta Stock MD, Nikos Ordaz MD, Jeffery Cox MD, Nathanael Cardoso MD, Joanie Newsome MD, Earl Chicas DO, David Causey DO, Maynor Kwok MD,  Addison Chand MD, Shirley Waterhouse, CNP,  Chelle Chan CNP, Mikel Wyatt, CNP,  Liliana Mojica, JAVIER, Karli Calle, CNP, Caitie Donahue, CNP, Phyllis Meade CNP, Dorothea Kevin, CNP, Lela Huitron, PA-C, Amy Jones PA-C, Latoya Gasca, CNP, Eri Fenton, CNP, Ofelia Whitley CNP, Francheska Castro, CNP, Ebony Scott CNP, Louise Terrazas, CNS, Jacqueline Owens, CNP, Lesvia Copeland CNP, Tracy Schwab, CNP         [facility]   IN-PATIENT SERVICE   [facility]    Progress Note    4/7/2024    11:36 AM    Name:   Angela Ghotra  MRN:     [id number]     Acct:      [id number]   Room:   2018/2018-01  IP Day:  3  Admit Date:  4/4/2024  5:11 PM    PCP:   No primary care provider on file.  Code Status:  Full Code    Subjective:     Patient seen in follow-up for symptomatic anemia due to presumed peptic ulcer disease, patient states \"I am feeling fine\"    Gastroenterology note reviewed.  Patient is scheduled for EGD tomorrow.  Overall he is feeling fine.  He is doing well with his diet and at this point in time it is okay to discontinue IV fluids he is maintained on PPI drip.  Hemoglobin has remained stable following blood transfusion.  At this point in time will await EGD with further decision making to follow.    Medications:     Allergies:    Allergies   Allergen Reactions

## 2024-04-07 NOTE — PROGRESS NOTES
[facility] Gastroenterology Progress Note    Angela Ghotra is a 54 y.o. male patient.  Hospitalization Day:3      Chief consult reason:   Melena     Subjective:  Pt seen and examined. No acute events overnight.  Pt denies any rectal bleeding, abdominal pain or nausea  Tolerated diet    Objective:   VITALS:  /74   Pulse 67   Temp 97.9 °F (36.6 °C) (Oral)   Resp 20   Ht 1.7 m (5' 6.93\")   Wt 51.3 kg (113 lb)   SpO2 96%   BMI 17.74 kg/m²   TEMPERATURE:  Current - Temp: 97.9 °F (36.6 °C); Max - Temp  Av.1 °F (36.7 °C)  Min: 97.7 °F (36.5 °C)  Max: 98.7 °F (37.1 °C)    Physical Exam  Constitutional:       General: He is not in acute distress.     Appearance: Normal appearance. He is not ill-appearing.   HENT:      Head: Normocephalic and atraumatic.   Eyes:      General: No scleral icterus.        Right eye: No discharge.         Left eye: No discharge.   Cardiovascular:      Rate and Rhythm: Normal rate and regular rhythm.      Pulses: Normal pulses.      Heart sounds: No murmur heard.  Pulmonary:      Effort: Pulmonary effort is normal. No respiratory distress.      Breath sounds: Normal breath sounds. No wheezing or rales.   Abdominal:      General: Bowel sounds are normal. There is no distension.      Tenderness: There is no abdominal tenderness. There is no guarding.   Musculoskeletal:      Right lower leg: No edema.      Left lower leg: No edema.   Skin:     Capillary Refill: Capillary refill takes less than 2 seconds.      Coloration: Skin is not jaundiced.      Findings: No lesion.   Neurological:      General: No focal deficit present.      Mental Status: He is alert and oriented to person, place, and time.      Cranial Nerves: No cranial nerve deficit.      Sensory: No sensory deficit.      Motor: No weakness.      CURRENT MEDICATIONS:  Scheduled Meds:   sodium chloride flush  5-40 mL IntraVENous 2 times per day    thiamine  100 mg Oral Daily    atorvastatin  40 mg Oral Nightly     metoprolol tartrate  25 mg Oral BID    pantoprazole (PROTONIX) 40 mg in sodium chloride (PF) 0.9 % 10 mL injection  40 mg IntraVENous Daily    sodium chloride flush  5-40 mL IntraVENous 2 times per day     Continuous Infusions:   sodium chloride      sodium chloride      sodium chloride      sodium chloride      pantoprazole 8 mg/hr (04/07/24 0400)    sodium chloride      sodium chloride      lactated ringers IV soln 125 mL/hr at 04/07/24 0409     PRN Meds:sodium chloride, sodium chloride, sodium chloride flush, sodium chloride, sodium chloride, sodium chloride, sodium chloride flush, sodium chloride, ondansetron **OR** ondansetron, polyethylene glycol, acetaminophen **OR** acetaminophen      Data Review:  LABS and IMAGING:     CBC  Recent Labs     04/04/24  1741 04/05/24  0027 04/06/24  0730 04/06/24  0951 04/06/24  1816 04/07/24  0134 04/07/24  0719   WBC 12.2*  --  8.3  --   --   --  6.3   HGB 4.9*   < > 10.6* 10.7* 11.7* 10.0* 10.3*   HCT 16.5*   < > 32.3* 32.9* 35.9* 30.2* 32.9*   MCV 76.0*  --  82.8  --   --   --  85.9   MCHC 29.7  --  32.8  --   --   --  31.3   RDW 22.3*  --  18.6*  --   --   --  18.7*     --  156  --   --   --  139    < > = values in this interval not displayed.         Immature PLTs   No results found for: \"PLTFLUORE\"    PT/INR  Recent Labs     04/04/24 1741   PROTIME 13.7   INR 1.1         ANEMIA STUDIES  Recent Labs     04/04/24  1806 04/04/24 2012 04/05/24  0025   IRONPERSAT  --  25  --    TIBC  --  303  --    IRON  --  77  --    FERRITIN  --   --  77   [id number] 736  --   --    FOLATE >20.0  --   --          BMP  Recent Labs     04/04/24  1735 04/04/24  1741 04/06/24  0730   NA  --  144 139   K  --  3.7 4.2   CL  --  110* 108*   CO2  --  20 21   BUN  --  36* 16   CREATININE 2.2* 2.2* 1.9*   GLUCOSE  --  122* 89   CALCIUM  --  9.5 8.7         LFTS  Recent Labs     04/04/24 2012   ALKPHOS 26*   ALT 11   AST 30   BILITOT 0.3   BILIDIR <0.2   LABALBU 3.7

## 2024-04-07 NOTE — PLAN OF CARE
Problem: Discharge Planning  Goal: Discharge to home or other facility with appropriate resources  4/6/2024 2120 by Maureen Keith RN  Outcome: Progressing  4/6/2024 1900 by Sunita Mccray RN  Outcome: Progressing  4/6/2024 0909 by Art Rojo RN  Outcome: Progressing     Problem: Safety - Adult  Goal: Free from fall injury  4/6/2024 2120 by Maureen Keith RN  Outcome: Progressing  4/6/2024 1900 by Sunita Mccray RN  Outcome: Progressing  4/6/2024 0909 by Art Rojo RN  Outcome: Progressing     Problem: ABCDS Injury Assessment  Goal: Absence of physical injury  4/6/2024 2120 by Maureen Keith RN  Outcome: Progressing  4/6/2024 1900 by Sunita Mccray RN  Outcome: Progressing  4/6/2024 0909 by Art Rojo RN  Outcome: Progressing

## 2024-04-08 ENCOUNTER — ANESTHESIA (OUTPATIENT)
Dept: OPERATING ROOM | Age: 54
DRG: 811 | End: 2024-04-08
Payer: COMMERCIAL

## 2024-04-08 VITALS
WEIGHT: 113 LBS | OXYGEN SATURATION: 100 % | BODY MASS INDEX: 17.74 KG/M2 | HEART RATE: 64 BPM | DIASTOLIC BLOOD PRESSURE: 99 MMHG | HEIGHT: 67 IN | RESPIRATION RATE: 18 BRPM | TEMPERATURE: 96.9 F | SYSTOLIC BLOOD PRESSURE: 187 MMHG

## 2024-04-08 PROBLEM — K22.2 SCHATZKI'S RING: Status: ACTIVE | Noted: 2024-04-08

## 2024-04-08 PROBLEM — F10.90 ALCOHOL USE: Status: ACTIVE | Noted: 2024-04-08

## 2024-04-08 PROBLEM — Z78.9 ALCOHOL USE: Status: ACTIVE | Noted: 2024-04-08

## 2024-04-08 LAB
ANION GAP SERPL CALCULATED.3IONS-SCNC: 10 MMOL/L (ref 9–16)
BASOPHILS # BLD: <0.03 K/UL (ref 0–0.2)
BASOPHILS NFR BLD: 0 % (ref 0–2)
BUN SERPL-MCNC: 9 MG/DL (ref 6–20)
CALCIUM SERPL-MCNC: 9.1 MG/DL (ref 8.6–10.4)
CHLORIDE SERPL-SCNC: 107 MMOL/L (ref 98–107)
CO2 SERPL-SCNC: 24 MMOL/L (ref 20–31)
CREAT SERPL-MCNC: 1.8 MG/DL (ref 0.7–1.2)
EOSINOPHIL # BLD: 0.22 K/UL (ref 0–0.44)
EOSINOPHILS RELATIVE PERCENT: 3 % (ref 1–4)
ERYTHROCYTE [DISTWIDTH] IN BLOOD BY AUTOMATED COUNT: 18.2 % (ref 11.8–14.4)
GFR SERPL CREATININE-BSD FRML MDRD: 43 ML/MIN/1.73M2
GLUCOSE SERPL-MCNC: 85 MG/DL (ref 74–99)
HCT VFR BLD AUTO: 31.9 % (ref 40.7–50.3)
HCT VFR BLD AUTO: 34.7 % (ref 40.7–50.3)
HCT VFR BLD AUTO: 37.3 % (ref 40.7–50.3)
HGB BLD-MCNC: 10.2 G/DL (ref 13–17)
HGB BLD-MCNC: 10.9 G/DL (ref 13–17)
HGB BLD-MCNC: 12 G/DL (ref 13–17)
IMM GRANULOCYTES # BLD AUTO: <0.03 K/UL (ref 0–0.3)
IMM GRANULOCYTES NFR BLD: 0 %
LYMPHOCYTES NFR BLD: 1.24 K/UL (ref 1.1–3.7)
LYMPHOCYTES RELATIVE PERCENT: 19 % (ref 24–43)
MCH RBC QN AUTO: 26.5 PG (ref 25.2–33.5)
MCHC RBC AUTO-ENTMCNC: 31.4 G/DL (ref 28.4–34.8)
MCV RBC AUTO: 84.4 FL (ref 82.6–102.9)
MONOCYTES NFR BLD: 0.82 K/UL (ref 0.1–1.2)
MONOCYTES NFR BLD: 12 % (ref 3–12)
NEUTROPHILS NFR BLD: 66 % (ref 36–65)
NEUTS SEG NFR BLD: 4.33 K/UL (ref 1.5–8.1)
NRBC BLD-RTO: 0.3 PER 100 WBC
PLATELET # BLD AUTO: 161 K/UL (ref 138–453)
PMV BLD AUTO: 10.6 FL (ref 8.1–13.5)
POTASSIUM SERPL-SCNC: 4.1 MMOL/L (ref 3.7–5.3)
RBC # BLD AUTO: 4.11 M/UL (ref 4.21–5.77)
RBC # BLD: ABNORMAL 10*6/UL
SODIUM SERPL-SCNC: 141 MMOL/L (ref 136–145)
WBC OTHER # BLD: 6.7 K/UL (ref 3.5–11.3)

## 2024-04-08 PROCEDURE — 6360000002 HC RX W HCPCS

## 2024-04-08 PROCEDURE — 0DB78ZX EXCISION OF STOMACH, PYLORUS, VIA NATURAL OR ARTIFICIAL OPENING ENDOSCOPIC, DIAGNOSTIC: ICD-10-PCS | Performed by: INTERNAL MEDICINE

## 2024-04-08 PROCEDURE — 88305 TISSUE EXAM BY PATHOLOGIST: CPT

## 2024-04-08 PROCEDURE — 6370000000 HC RX 637 (ALT 250 FOR IP): Performed by: INTERNAL MEDICINE

## 2024-04-08 PROCEDURE — 99232 SBSQ HOSP IP/OBS MODERATE 35: CPT | Performed by: STUDENT IN AN ORGANIZED HEALTH CARE EDUCATION/TRAINING PROGRAM

## 2024-04-08 PROCEDURE — 2500000003 HC RX 250 WO HCPCS

## 2024-04-08 PROCEDURE — 3700000001 HC ADD 15 MINUTES (ANESTHESIA): Performed by: INTERNAL MEDICINE

## 2024-04-08 PROCEDURE — 85014 HEMATOCRIT: CPT

## 2024-04-08 PROCEDURE — 2709999900 HC NON-CHARGEABLE SUPPLY: Performed by: INTERNAL MEDICINE

## 2024-04-08 PROCEDURE — 36415 COLL VENOUS BLD VENIPUNCTURE: CPT

## 2024-04-08 PROCEDURE — 0DB98ZX EXCISION OF DUODENUM, VIA NATURAL OR ARTIFICIAL OPENING ENDOSCOPIC, DIAGNOSTIC: ICD-10-PCS | Performed by: INTERNAL MEDICINE

## 2024-04-08 PROCEDURE — 3609012400 HC EGD TRANSORAL BIOPSY SINGLE/MULTIPLE: Performed by: INTERNAL MEDICINE

## 2024-04-08 PROCEDURE — 43239 EGD BIOPSY SINGLE/MULTIPLE: CPT | Performed by: INTERNAL MEDICINE

## 2024-04-08 PROCEDURE — 80048 BASIC METABOLIC PNL TOTAL CA: CPT

## 2024-04-08 PROCEDURE — 7100000001 HC PACU RECOVERY - ADDTL 15 MIN: Performed by: INTERNAL MEDICINE

## 2024-04-08 PROCEDURE — 85025 COMPLETE CBC W/AUTO DIFF WBC: CPT

## 2024-04-08 PROCEDURE — 7100000000 HC PACU RECOVERY - FIRST 15 MIN: Performed by: INTERNAL MEDICINE

## 2024-04-08 PROCEDURE — 85018 HEMOGLOBIN: CPT

## 2024-04-08 PROCEDURE — 3700000000 HC ANESTHESIA ATTENDED CARE: Performed by: INTERNAL MEDICINE

## 2024-04-08 PROCEDURE — 2580000003 HC RX 258: Performed by: STUDENT IN AN ORGANIZED HEALTH CARE EDUCATION/TRAINING PROGRAM

## 2024-04-08 PROCEDURE — 1800000000 HC LEAVE OF ABSENCE

## 2024-04-08 RX ORDER — PROPOFOL 10 MG/ML
INJECTION, EMULSION INTRAVENOUS PRN
Status: DISCONTINUED | OUTPATIENT
Start: 2024-04-08 | End: 2024-04-08 | Stop reason: SDUPTHER

## 2024-04-08 RX ORDER — ATORVASTATIN CALCIUM 40 MG/1
40 TABLET, FILM COATED ORAL NIGHTLY
Qty: 30 TABLET | Refills: 3 | Status: SHIPPED | OUTPATIENT
Start: 2024-04-08

## 2024-04-08 RX ORDER — METOPROLOL SUCCINATE 25 MG/1
25 TABLET, EXTENDED RELEASE ORAL DAILY
Qty: 30 TABLET | Refills: 3 | Status: SHIPPED | OUTPATIENT
Start: 2024-04-08

## 2024-04-08 RX ORDER — SODIUM CHLORIDE 0.9 % (FLUSH) 0.9 %
5-40 SYRINGE (ML) INJECTION EVERY 12 HOURS SCHEDULED
Status: DISCONTINUED | OUTPATIENT
Start: 2024-04-08 | End: 2024-04-08

## 2024-04-08 RX ORDER — SODIUM CHLORIDE, SODIUM LACTATE, POTASSIUM CHLORIDE, CALCIUM CHLORIDE 600; 310; 30; 20 MG/100ML; MG/100ML; MG/100ML; MG/100ML
INJECTION, SOLUTION INTRAVENOUS CONTINUOUS
Status: DISCONTINUED | OUTPATIENT
Start: 2024-04-08 | End: 2024-04-08 | Stop reason: HOSPADM

## 2024-04-08 RX ORDER — NALOXONE HYDROCHLORIDE 0.4 MG/ML
INJECTION, SOLUTION INTRAMUSCULAR; INTRAVENOUS; SUBCUTANEOUS PRN
Status: DISCONTINUED | OUTPATIENT
Start: 2024-04-08 | End: 2024-04-08

## 2024-04-08 RX ORDER — PANTOPRAZOLE SODIUM 40 MG/1
40 TABLET, DELAYED RELEASE ORAL
Status: DISCONTINUED | OUTPATIENT
Start: 2024-04-09 | End: 2024-04-08 | Stop reason: HOSPADM

## 2024-04-08 RX ORDER — FOLIC ACID 1 MG/1
1 TABLET ORAL DAILY
Qty: 30 TABLET | Refills: 0 | Status: SHIPPED | OUTPATIENT
Start: 2024-04-08

## 2024-04-08 RX ORDER — SODIUM CHLORIDE 0.9 % (FLUSH) 0.9 %
5-40 SYRINGE (ML) INJECTION PRN
Status: DISCONTINUED | OUTPATIENT
Start: 2024-04-08 | End: 2024-04-08

## 2024-04-08 RX ORDER — MULTIVIT-MIN/IRON FUM/FOLIC AC 7.5 MG-4
1 TABLET ORAL DAILY
Qty: 30 TABLET | Refills: 3 | Status: SHIPPED | OUTPATIENT
Start: 2024-04-08

## 2024-04-08 RX ORDER — PANTOPRAZOLE SODIUM 40 MG/1
40 TABLET, DELAYED RELEASE ORAL
Qty: 60 TABLET | Refills: 0 | Status: SHIPPED | OUTPATIENT
Start: 2024-04-08

## 2024-04-08 RX ORDER — GLYCOPYRROLATE 1 MG/5 ML
SYRINGE (ML) INTRAVENOUS PRN
Status: DISCONTINUED | OUTPATIENT
Start: 2024-04-08 | End: 2024-04-08 | Stop reason: SDUPTHER

## 2024-04-08 RX ORDER — LANOLIN ALCOHOL/MO/W.PET/CERES
100 CREAM (GRAM) TOPICAL DAILY
Qty: 30 TABLET | Refills: 3 | Status: SHIPPED | OUTPATIENT
Start: 2024-04-09

## 2024-04-08 RX ORDER — SODIUM CHLORIDE 9 MG/ML
INJECTION, SOLUTION INTRAVENOUS PRN
Status: DISCONTINUED | OUTPATIENT
Start: 2024-04-08 | End: 2024-04-08

## 2024-04-08 RX ORDER — LIDOCAINE HYDROCHLORIDE 10 MG/ML
INJECTION, SOLUTION EPIDURAL; INFILTRATION; INTRACAUDAL; PERINEURAL PRN
Status: DISCONTINUED | OUTPATIENT
Start: 2024-04-08 | End: 2024-04-08 | Stop reason: SDUPTHER

## 2024-04-08 RX ORDER — SUCRALFATE 1 G/1
1 TABLET ORAL 4 TIMES DAILY
Qty: 120 TABLET | Refills: 3 | Status: SHIPPED | OUTPATIENT
Start: 2024-04-08

## 2024-04-08 RX ADMIN — METOPROLOL TARTRATE 25 MG: 25 TABLET, FILM COATED ORAL at 10:32

## 2024-04-08 RX ADMIN — LIDOCAINE HYDROCHLORIDE 100 MG: 10 INJECTION, SOLUTION EPIDURAL; INFILTRATION; INTRACAUDAL; PERINEURAL at 09:15

## 2024-04-08 RX ADMIN — PROPOFOL 150 MG: 10 INJECTION, EMULSION INTRAVENOUS at 09:17

## 2024-04-08 RX ADMIN — Medication 100 MG: at 10:32

## 2024-04-08 RX ADMIN — SODIUM CHLORIDE, POTASSIUM CHLORIDE, SODIUM LACTATE AND CALCIUM CHLORIDE: 600; 310; 30; 20 INJECTION, SOLUTION INTRAVENOUS at 08:10

## 2024-04-08 RX ADMIN — Medication 0.1 MG: at 09:15

## 2024-04-08 ASSESSMENT — ENCOUNTER SYMPTOMS
ABDOMINAL DISTENTION: 0
ABDOMINAL PAIN: 0
EYE REDNESS: 0
EYE PAIN: 0
APNEA: 0
CHEST TIGHTNESS: 0

## 2024-04-08 ASSESSMENT — PAIN - FUNCTIONAL ASSESSMENT: PAIN_FUNCTIONAL_ASSESSMENT: NONE - DENIES PAIN

## 2024-04-08 NOTE — CARE COORDINATION
Advised pt to be discharged. Pt from [facility] and plans to return.  Called [facility] and spoke with Shivam to advise pt being discharged today.  Pt able to return. CM updated and will arrange a cab.

## 2024-04-08 NOTE — ANESTHESIA PRE PROCEDURE
Vitals:    04/07/24 1945 04/07/24 2345 04/08/24 0515 04/08/24 0751   BP: (!) 145/82 (!) 144/98 131/89 (!) 157/86   Pulse: 72 64 54 62   Resp:   20 18   Temp:  98.9 °F (37.2 °C) 98.4 °F (36.9 °C) 98.1 °F (36.7 °C)   TempSrc:  Oral Oral Temporal   SpO2:   99% 99%   Weight:    51.3 kg (113 lb)   Height:    1.702 m (5' 7\")                                              BP Readings from Last 3 Encounters:   04/08/24 (!) 157/86   02/12/24 138/89   02/11/24 (!) 159/94       NPO Status:                                                                                 BMI:   Wt Readings from Last 3 Encounters:   04/08/24 51.3 kg (113 lb)   02/11/24 60.8 kg (134 lb 0.6 oz)   02/11/24 65.8 kg (145 lb)     Body mass index is 17.7 kg/m².    CBC:   Lab Results   Component Value Date/Time    WBC 6.7 04/08/2024 06:31 AM    RBC 4.11 04/08/2024 06:31 AM    HGB 10.9 04/08/2024 06:31 AM    HCT 34.7 04/08/2024 06:31 AM    MCV 84.4 04/08/2024 06:31 AM    RDW 18.2 04/08/2024 06:31 AM     04/08/2024 06:31 AM       CMP:   Lab Results   Component Value Date/Time     04/08/2024 06:31 AM    K 4.1 04/08/2024 06:31 AM     04/08/2024 06:31 AM    CO2 24 04/08/2024 06:31 AM    BUN 9 04/08/2024 06:31 AM    CREATININE 1.8 04/08/2024 06:31 AM    AGRATIO 2.0 04/04/2024 08:12 PM    LABGLOM 43 04/08/2024 06:31 AM    GLUCOSE 85 04/08/2024 06:31 AM    PROT 5.4 04/04/2024 08:12 PM    CALCIUM 9.1 04/08/2024 06:31 AM    BILITOT 0.3 04/04/2024 08:12 PM    ALKPHOS 26 04/04/2024 08:12 PM    AST 30 04/04/2024 08:12 PM    ALT 11 04/04/2024 08:12 PM       POC Tests: No results for input(s): \"POCGLU\", \"POCNA\", \"POCK\", \"POCCL\", \"POCBUN\", \"POCHEMO\", \"POCHCT\" in the last 72 hours.    Coags:   Lab Results   Component Value Date/Time    PROTIME 13.7 04/04/2024 05:41 PM    INR 1.1 04/04/2024 05:41 PM    APTT 24.5 04/04/2024 05:41 PM       HCG (If Applicable): No results found for: \"PREGTESTUR\", \"PREGSERUM\", \"HCG\", \"HCGQUANT\"     ABGs: No results found

## 2024-04-08 NOTE — CARE COORDINATION
Met with patient to discuss transitional planning. He is returning to [facility]. He will need cab transportation. He denies other needs    1800 patient requesting to go to [address] [place], [place] [address] He has transportation to go to [facility]. Cab scheduled with [employer]. Confirmation #[id number]     Discharge Report    [facility]  Clinical Case Management Department  Written by: Leeanna Restrepo RN    Patient Name: Angela Ghotra  Attending Provider: Marilee Bautista MD  Admit Date: 2024  5:11 PM  MRN: [id number]  Account: [id number]                     : 1970  Discharge Date: 2024      Disposition: shelter    Leeanna Restrepo RN

## 2024-04-08 NOTE — OP NOTE
PROCEDURE NOTE    DATE OF PROCEDURE: 4/8/2024     SURGEON: Jose Raul Cuevas MD  Facility: [facility]  ASSISTANT: None  Anesthesia:   PREOPERATIVE DIAGNOSIS:   Severe anemia  Melena  Alcohol use    Diagnosis:  As below        POSTOPERATIVE DIAGNOSIS: As described below    OPERATION: Upper GI endoscopy with Biopsy    ANESTHESIA: Moderate Sedation     ESTIMATED BLOOD LOSS: Less than 50 ml    COMPLICATIONS: None.     SPECIMENS:  Was Obtained: As below    HISTORY: The patient is a 54 y.o. year old male with history of above preop diagnosis.  I recommended esophagogastroduodenoscopy with possible biopsy and I explained the risk, benefits, expected outcome, and alternatives to the procedure.  Risks included but are not limited to bleeding, infection, respiratory distress, hypotension, and perforation of the esophagus, stomach, or duodenum.  Patient understands and is in agreement.      The patient was counseled at length about the risks of susy Covid-19 during their perioperative period and any recovery window from their procedure.  The patient was made aware that susy Covid-19  may worsen their prognosis for recovering from their procedure  and lend to a higher morbidity and/or mortality risk.  All material risks, benefits, and reasonable alternatives including postponing the procedure were discussed. The patient does wish to proceed with the procedure at this time.         PROCEDURE: The patient was given IV conscious sedation.  The patient's SPO2 remained above 90% throughout the procedure.The gastroscope was inserted orally and advanced under direct vision through the esophagus, through the stomach, through the pylorus, and into the descending duodenum.      Post sedation note :The patient's SPO2 remained above 90% throughout the procedure.the vital signs remained stable , and no immediate complication form the procedure noted, patient will be ready for d/c when criteria is met  .      Findings:    Retropharyngeal area was grossly normal appearing  No esophageal varices seen  Schatzki's ring      1 gastric varix with edema and swelling in the area in the fundus of the stomach with scaly skin appearance and edema consistent with portal hypertension gastropathy      Mild gastritis in the antrum and the body of the stomach gentle biopsies were taken      Random small bowel biopsies were taken    The scope was removed and the patient tolerated the procedure well.     Recommendations/Plan:   F/U Biopsies  Nonselective beta-blocker, will leave the decision to our cardiology colleagues if they can switch him from metoprolol to nadolol or propranolol  Follow-up exam in few months  Colonoscopy patient  Patient need to quit drinking and he might need rehab if he is not can to be able to do it    Electronically signed by Jose Raul Cuevas MD  on 4/8/2024 at 9:25 AM

## 2024-04-08 NOTE — ANESTHESIA POSTPROCEDURE EVALUATION
Department of Anesthesiology  Postprocedure Note    Patient: Angela Ghotra  MRN: [id number]  YOB: 1970  Date of evaluation: 4/8/2024    Procedure Summary       Date: 04/08/24 Room / Location: [address]    Anesthesia Start: 0912 Anesthesia Stop: 0933    Procedure: ESOPHAGOGASTRODUODENOSCOPY BIOPSY Diagnosis:       Melena      (Melena [K92.1])    Surgeons: Jose Raul Cuevas MD Responsible Provider: Dov Acevedo MD    Anesthesia Type: MAC ASA Status: 3            Anesthesia Type: No value filed.    Brennon Phase I: Brennon Score: 10    Brennon Phase II:      Anesthesia Post Evaluation    Patient location during evaluation: bedside  Patient participation: complete - patient participated  Level of consciousness: awake  Airway patency: patent  Nausea & Vomiting: no nausea and no vomiting  Cardiovascular status: hemodynamically stable  Respiratory status: acceptable  Hydration status: stable  Comments: BP (!) 157/86   Pulse 62   Temp 98.1 °F (36.7 °C) (Temporal)   Resp 18   Ht 1.702 m (5' 7\")   Wt 51.3 kg (113 lb)   SpO2 99%   BMI 17.70 kg/m²       No notable events documented.

## 2024-04-08 NOTE — PROGRESS NOTES
Discharge instructions given to patient, all questions answered.  Explained to patient follow up appointments and that per his request meds were transferred to his pharmacy.  Pt dischared via cab

## 2024-04-08 NOTE — PLAN OF CARE
Problem: Discharge Planning  Goal: Discharge to home or other facility with appropriate resources  Outcome: Progressing  Flowsheets (Taken 4/7/2024 0800)  Discharge to home or other facility with appropriate resources: Arrange for needed discharge resources and transportation as appropriate     Problem: Safety - Adult  Goal: Free from fall injury  Outcome: Progressing     Problem: ABCDS Injury Assessment  Goal: Absence of physical injury  Outcome: Progressing

## 2024-04-08 NOTE — PROGRESS NOTES
[facility]  Office: [phone]  Keny Nolan DO, Eliseo Corey DO, Jordan Love DO, Mina Pérez DO, Lela Pritchett MD, Muna Jaquez MD, Maisha Altamirano MD, Nicole Saunders MD,  Raheem Shaw MD, Sam Kern MD, Marilee Bautista MD,  Jacob Dinero DO, Corina Jaime MD, Armani Lee MD, Camden Nolan DO, Viky Osborne MD,  Carlos Macario DO, Eunice Cohn MD, Jyotsna Anne MD, Glenis Yip MD, Robert Lu MD,  Roshan Baker MD, Britta Stock MD, Nikos Ordaz MD, Jeffery Cox MD, Nathanael Cardoso MD, Joanie Newsome MD, Earl Chicas DO, David Causey DO, Maynor Kwok MD,  Addison Chand MD, Shirley Waterhouse, CNP,  Chelle Chan CNP, Mikel Wyatt, CNP,  Liliana Mojica, DNP, Karli Calle, CNP, Caitie Donahue, CNP, Phyllis Meade CNP, Dorothea Kevin, CNP, Lela Huitron, PA-C, Amy Jones PA-C, Latoya Gasca, CNP, Eri Fenton, CNP, Ofelia Whitley, CNP, Francheska Castro, CNP, Ebony Scott, CNP, Louise Terrazas, CNS, Jacqueline Owens, CNP, Lesvia Copeland CNP, Tracy Schwab, CNP         [facility]   IN-PATIENT SERVICE   [facility]    Progress Note    4/8/2024    6:01 PM    Name:   Angela Ghotra  MRN:     [id number]     Acct:      [id number]   Room:   2018/2018-01   Day:  4  Admit Date:  4/4/2024  5:11 PM    PCP:   No primary care provider on file.  Code Status:  Full Code    Subjective:     C/C:   Chief Complaint   Patient presents with    Shortness of Breath    Chest Pain     Interval History Status: not changed.     Patient seen examined at bedside.  Status post EGD.  Overall feeling well. Wants to go home.  No complaints.    Brief History:     54-year-old male past medical history of alcohol abuse, smoker presents with fatigue and shortness of breath concern for GI bleed.  Patient evaluated by cardiology and GI.  Underwent EGD found to have Schatzki's ring.  Patient to follow-up with GI as outpatient and encouraged to stop  displayed.     Chemistry:  Recent Labs     04/06/24  0730 04/08/24  0631    141   K 4.2 4.1   * 107   CO2 21 24   GLUCOSE 89 85   BUN 16 9   CREATININE 1.9* 1.8*   ANIONGAP 10 10   LABGLOM 41* 43*   CALCIUM 8.7 9.1   No results for input(s): \"PROT\", \"LABALBU\", \"LABA1C\", \"S3GXEBG\", \"K9FWLQW\", \"FT4\", \"TSH\", \"AST\", \"ALT\", \"LDH\", \"GGT\", \"ALKPHOS\", \"LABGGT\", \"BILITOT\", \"BILIDIR\", \"AMMONIA\", \"AMYLASE\", \"LIPASE\", \"LACTATE\", \"CHOL\", \"HDL\", \"LDLCHOLESTEROL\", \"CHOLHDLRATIO\", \"TRIG\", \"VLDL\", \"JUI45RO\", \"PHENYTOIN\", \"PHENYF\", \"URICACID\", \"POCGLU\" in the last 72 hours.  ABG:No results found for: \"POCPH\", \"PHART\", \"PH\", \"POCPCO2\", \"CLZ4BJF\", \"PCO2\", \"POCPO2\", \"PO2ART\", \"PO2\", \"POCHCO3\", \"LQI2XMU\", \"HCO3\", \"NBEA\", \"PBEA\", \"BEART\", \"BE\", \"THGBART\", \"THB\", \"CKA5PPD\", \"MVWR0YTP\", \"F4NOXRZT\", \"O2SAT\", \"FIO2\"  Lab Results   Component Value Date/Time    SPECIAL LEFT FOREARM 1ML 04/04/2024 10:56 PM     Lab Results   Component Value Date/Time    CULTURE NO GROWTH 3 DAYS 04/04/2024 10:56 PM       Radiology:  MRI LIMITED BRAIN    Result Date: 4/4/2024  1. No acute intracranial abnormality. 2. Unremarkable MRA of the head and neck.     MRA HEAD WO CONTRAST    Result Date: 4/4/2024  1. No acute intracranial abnormality. 2. Unremarkable MRA of the head and neck.     MRA NECK WO CONTRAST    Result Date: 4/4/2024  1. No acute intracranial abnormality. 2. Unremarkable MRA of the head and neck.     CT HEAD WO CONTRAST    Addendum Date: 4/4/2024    ADDENDUM: Findings were discussed with Dr. Aleksey Lindo at 5:03 pm on 4/4/2024.     Result Date: 4/4/2024  No acute intracranial abnormality.       Physical Examination:        Physical Exam  Constitutional:       Appearance: He is ill-appearing. He is not diaphoretic.   HENT:      Head: Normocephalic.      Right Ear: External ear normal.      Left Ear: External ear normal.      Nose: Nose normal.      Mouth/Throat:      Mouth: Mucous membranes are moist.   Eyes:      Pupils: Pupils are

## 2024-04-08 NOTE — DISCHARGE SUMMARY
[facility]  Office: [phone]  Keny Nolan DO, Eliseo Corey DO, Jordan Love DO, Mina Pérez DO, Lela Pritchett MD, Muna Jaquez MD, Maisha Altamirano MD, Nicole Saunders MD,  Raheem Shaw MD, Sam Kern MD, Marilee Bautista MD,  Jacob Dinero DO, Corina Jaime MD, Armani Lee MD, Camden Nolan DO, Viky Osborne MD,  Carlos Macario DO, Eunice Cohn MD, Jyotsna Anne MD, Glenis Yip MD, Robert Lu MD,  Roshan Baker MD, Britta Stock MD, Nikos Ordaz MD, Jeffery Cox MD, Nathanael Cardoso MD, Joanie Newsome MD, Earl Chicas DO, David Causey DO, Maynor Kwok MD,  Addison Chand MD, Shirley Waterhouse, CNP,  Chelle Chan CNP, Mikel Wyatt, CNP,  Liliana Mojica, JAVIER, Karli Calle, CNP, Caitie Donahue, CNP, Phyllis Meade CNP, Dorothea Kevin CNP, Lela Huitron, PA-C, Amy Jones PA-C, Latoya Gasca, CNP, Eri Fenton, CNP, Ofelia Whitley CNP, Francheska Castro, CNP, Ebony Scott CNP, Louise Terrazas, CNS, Jacqueline Owens, CNP, Lesvia Copeland CNP, Tracy Schwab, CNP         [facility]   IN-PATIENT SERVICE   [facility]    Discharge Summary     Patient ID: Angela Ghotra  :  1970   MRN: [id number]     ACCOUNT:  [id number]   Patient's PCP: No primary care provider on file.  Admit Date: 2024   Discharge Date: 2024     Length of Stay: 4  Code Status:  Full Code  Admitting Physician: No admitting provider for patient encounter.  Discharge Physician: Marilee Bautista MD     Active Discharge Diagnoses:     Hospital Problem Lists:  Principal Problem:    GI bleed  Active Problems:    NSTEMI (non-ST elevated myocardial infarction) (HCC)    Alcohol use    Schatzki's ring  Resolved Problems:    * No resolved hospital problems. *      Admission Condition:  stable     Discharged Condition: stable    Hospital Stay:     Hospital Course:  Angela Ghotra is a 54 y.o. male who was admitted for the management of   GI bleed ,  04/08/2024 06:31 AM    CALCIUM 9.1 04/08/2024 06:31 AM    LABGLOM 43 04/08/2024 06:31 AM     HFP:    Lab Results   Component Value Date/Time    PROT 5.4 04/04/2024 08:12 PM        Radiology:  MRI LIMITED BRAIN    Result Date: 4/4/2024  1. No acute intracranial abnormality. 2. Unremarkable MRA of the head and neck.     MRA HEAD WO CONTRAST    Result Date: 4/4/2024  1. No acute intracranial abnormality. 2. Unremarkable MRA of the head and neck.     MRA NECK WO CONTRAST    Result Date: 4/4/2024  1. No acute intracranial abnormality. 2. Unremarkable MRA of the head and neck.     CT HEAD WO CONTRAST    Addendum Date: 4/4/2024    ADDENDUM: Findings were discussed with Dr. Aleksey Lindo at 5:03 pm on 4/4/2024.     Result Date: 4/4/2024  No acute intracranial abnormality.       Consultations:    Consults:     Final Specialist Recommendations/Findings:   IP CONSULT TO CARDIOLOGY  IP CONSULT TO CRITICAL CARE  IP CONSULT TO GI  IP CONSULT TO SOCIAL WORK      The patient was seen and examined on day of discharge and this discharge summary is in conjunction with any daily progress note from day of discharge.    Discharge plan:     Disposition: Home    Physician Follow Up:     Jose Raul Cuevas MD  [address] [facility] [address] [phone]    Call in 1 week(s)  biopsy results    Soy Miller MD  [address] [address]  [phone]    Call in 1 week(s)  hospital follow up       Requiring Further Evaluation/Follow Up POST HOSPITALIZATION/Incidental Findings: Follow PCP, follow-up GI, follow cardiology    Diet: cardiac diet    Activity: As tolerated    Instructions to Patient: Follow PCP, follow cardiac neurology, follow-up GI    Discharge Medications:      Medication List        START taking these medications      atorvastatin 40 MG tablet  Commonly known as: LIPITOR  Take 1 tablet by mouth nightly     folic acid 1 MG tablet  Commonly known as: FOLVITE  Take 1 tablet by mouth  daily     metoprolol succinate 25 MG extended release tablet  Commonly known as: Toprol XL  Take 1 tablet by mouth daily     multivitamin with minerals tablet  Take 1 tablet by mouth daily     pantoprazole 40 MG tablet  Commonly known as: PROTONIX  Take 1 tablet by mouth 2 times daily (before meals)     sucralfate 1 GM tablet  Commonly known as: Carafate  Take 1 tablet by mouth 4 times daily     thiamine 100 MG tablet  Take 1 tablet by mouth daily  Start taking on: April 9, 2024            STOP taking these medications      amLODIPine 5 MG tablet  Commonly known as: NORVASC     carvedilol 12.5 MG tablet  Commonly known as: COREG     naproxen sodium 550 MG tablet  Commonly known as: ANAPROX     rosuvastatin 5 MG tablet  Commonly known as: CRESTOR     tadalafil 5 MG tablet  Commonly known as: CIALIS     vitamin D 1.25 MG (25053 UT) Caps capsule  Commonly known as: ERGOCALCIFEROL     Vitamin D3 50 MCG (2000 UT) Caps capsule               Where to Get Your Medications        These medications were sent to [address] -  [phone] - F [phone]  [address] [place] [address]      Phone: [phone]   atorvastatin 40 MG tablet  folic acid 1 MG tablet  metoprolol succinate 25 MG extended release tablet  multivitamin with minerals tablet  pantoprazole 40 MG tablet  sucralfate 1 GM tablet  thiamine 100 MG tablet         Discharge Procedure Orders   Jose Raul Christina MD, Gastroenterology, [facility]   Referral Priority: Routine Referral Type: Eval and Treat   Referral Reason: Specialty Services Required   Referred to Provider: JOSE RAUL VARGAS Requested Specialty: Gastroenterology   Number of Visits Requested: 1     [facility]   Referral Priority: Routine Referral Type: Eval and Treat   Referral Reason: Specialty Services Required   Referred to Provider: YAAKOV MILLER Requested Specialty: Internal Medicine Cardiovascular  Disease   Number of Visits Requested: 1       Time Spent on discharge is  31 mins in patient examination, evaluation, counseling as well as medication reconciliation, prescriptions for required medications, discharge plan and follow up.    Electronically signed by   Marilee Bautista MD  4/8/2024  6:14 PM      Thank you Dr. Guerrero primary care provider on file. for the opportunity to be involved in this patient's care.

## 2024-04-09 LAB
MICROORGANISM SPEC CULT: NORMAL
MICROORGANISM SPEC CULT: NORMAL
SERVICE CMNT-IMP: NORMAL
SERVICE CMNT-IMP: NORMAL
SPECIMEN DESCRIPTION: NORMAL
SPECIMEN DESCRIPTION: NORMAL
SURGICAL PATHOLOGY REPORT: NORMAL

## 2024-04-09 PROCEDURE — 1800000000 HC LEAVE OF ABSENCE

## 2024-04-10 PROCEDURE — 1800000000 HC LEAVE OF ABSENCE

## 2024-04-11 PROCEDURE — 1800000000 HC LEAVE OF ABSENCE

## 2024-04-12 PROCEDURE — 1800000000 HC LEAVE OF ABSENCE

## 2024-04-12 NOTE — PROGRESS NOTES
Physician Progress Note      PATIENT:               ALVARO TORO  CSN #:                  [id number]  :                       1970  ADMIT DATE:       2024 5:11 PM  DISCH DATE:        2024 6:25 PM  RESPONDING  PROVIDER #:        Marilee PEREZ MD          QUERY TEXT:    Patient admitted with GIB. Noted documentation of both NSTEMI and Type 2 MI.  If possible, please document in progress notes and discharge summary if you   are evaluating and /or treating any of the following:    The medical record reflects the following:  Risk Factors: GIB,CASPER,HTN  Clinical Indicators: Presented to ED with c/o SOB and chest pain. Found to   have GIB with Acute anemia.Per Cardiology progress notes-' Elevated troponin   is likely type II in the setting of severe anemia.' Per IM progress note -'   Clinically type II myocardial infarction secondary to symptomatic anemia.'   Per H&P and DC summary ' NSTEMI' documented on active problem list. Per H&P -   '  EKG showed inferolateral ST depression consistent with NSTEMI..' Per ED   notes-' Patient with myocardial ischemia on EKG, depression in the lateral and   inferior leads with T wave inversions.' Tro  Treatment: Cardiac workup, GI workup, PRBC infusions    Thank you,  [email or web address]  office hours  m-f 7-3  Options provided:  -- NSTEMI confirmed and Type 2 MI ruled out  -- Type 2 MI confirmed and NSTEMI ruled out  -- Other - I will add my own diagnosis  -- Disagree - Not applicable / Not valid  -- Disagree - Clinically unable to determine / Unknown  -- Refer to Clinical Documentation Reviewer    PROVIDER RESPONSE TEXT:    After study, NSTEMI confirmed and Type 2 MI ruled out.    Query created by: Enriqueta Madrid on 2024 7:00 AM      Electronically signed by:  Marilee PEREZ MD 2024 7:29 AM

## 2024-04-13 PROCEDURE — 1800000000 HC LEAVE OF ABSENCE

## 2024-04-14 PROCEDURE — 1800000000 HC LEAVE OF ABSENCE

## 2024-04-15 PROCEDURE — 1800000000 HC LEAVE OF ABSENCE

## 2024-04-16 PROCEDURE — 1800000000 HC LEAVE OF ABSENCE

## 2024-04-17 PROCEDURE — 1800000000 HC LEAVE OF ABSENCE

## 2024-04-18 PROCEDURE — 1800000000 HC LEAVE OF ABSENCE

## 2024-04-19 PROCEDURE — 1800000000 HC LEAVE OF ABSENCE

## 2024-04-20 PROCEDURE — 1800000000 HC LEAVE OF ABSENCE

## 2024-04-21 PROCEDURE — 1800000000 HC LEAVE OF ABSENCE

## 2024-04-22 PROCEDURE — 1800000000 HC LEAVE OF ABSENCE

## 2024-04-23 PROCEDURE — 1800000000 HC LEAVE OF ABSENCE

## 2024-04-24 PROCEDURE — 1800000000 HC LEAVE OF ABSENCE

## 2024-04-25 PROCEDURE — 1800000000 HC LEAVE OF ABSENCE

## 2024-04-26 PROCEDURE — 1800000000 HC LEAVE OF ABSENCE

## 2024-04-27 ENCOUNTER — APPOINTMENT (OUTPATIENT)
Dept: CT IMAGING | Age: 54
DRG: 377 | End: 2024-04-27
Payer: COMMERCIAL

## 2024-04-27 ENCOUNTER — APPOINTMENT (OUTPATIENT)
Dept: GENERAL RADIOLOGY | Age: 54
DRG: 377 | End: 2024-04-27
Payer: COMMERCIAL

## 2024-04-27 ENCOUNTER — HOSPITAL ENCOUNTER (INPATIENT)
Age: 54
LOS: 13 days | Discharge: HOME | DRG: 377 | End: 2024-05-10
Attending: EMERGENCY MEDICINE
Payer: COMMERCIAL

## 2024-04-27 DIAGNOSIS — K92.2 GASTROINTESTINAL HEMORRHAGE, UNSPECIFIED GASTROINTESTINAL HEMORRHAGE TYPE: Primary | ICD-10-CM

## 2024-04-27 DIAGNOSIS — K86.89 PANCREATIC MASS: ICD-10-CM

## 2024-04-27 DIAGNOSIS — I86.4 GASTRIC VARICES: ICD-10-CM

## 2024-04-27 LAB
ALBUMIN SERPL-MCNC: 3.7 G/DL (ref 3.5–5.2)
ALBUMIN/GLOB SERPL: 2 {RATIO} (ref 1–2.5)
ALP SERPL-CCNC: 29 U/L (ref 40–129)
ALT SERPL-CCNC: 11 U/L (ref 10–50)
ANION GAP SERPL CALCULATED.3IONS-SCNC: 15 MMOL/L (ref 9–16)
AST SERPL-CCNC: 20 U/L (ref 10–50)
BILIRUB SERPL-MCNC: 0.3 MG/DL (ref 0–1.2)
BUN SERPL-MCNC: 43 MG/DL (ref 6–20)
CALCIUM SERPL-MCNC: 9 MG/DL (ref 8.6–10.4)
CHLORIDE SERPL-SCNC: 110 MMOL/L (ref 98–107)
CO2 SERPL-SCNC: 19 MMOL/L (ref 20–31)
CREAT SERPL-MCNC: 1.9 MG/DL (ref 0.7–1.2)
ERYTHROCYTE [DISTWIDTH] IN BLOOD BY AUTOMATED COUNT: 19.2 % (ref 11.8–14.4)
GFR SERPL CREATININE-BSD FRML MDRD: 41 ML/MIN/1.73M2
GLUCOSE SERPL-MCNC: 170 MG/DL (ref 74–99)
HCT VFR BLD AUTO: 19.6 % (ref 40.7–50.3)
HCT VFR BLD AUTO: 21.2 % (ref 40.7–50.3)
HGB BLD-MCNC: 6 G/DL (ref 13–17)
HGB BLD-MCNC: 6.7 G/DL (ref 13–17)
LACTIC ACID, WHOLE BLOOD: 4.3 MMOL/L (ref 0.7–2.1)
LIPASE SERPL-CCNC: 19 U/L (ref 13–60)
MCH RBC QN AUTO: 24.9 PG (ref 25.2–33.5)
MCHC RBC AUTO-ENTMCNC: 30.6 G/DL (ref 28.4–34.8)
MCV RBC AUTO: 81.3 FL (ref 82.6–102.9)
NRBC BLD-RTO: 0.5 PER 100 WBC
PLATELET # BLD AUTO: 276 K/UL (ref 138–453)
PMV BLD AUTO: 10.6 FL (ref 8.1–13.5)
POTASSIUM SERPL-SCNC: 3.3 MMOL/L (ref 3.7–5.3)
PROT SERPL-MCNC: 5.2 G/DL (ref 6.6–8.7)
RBC # BLD AUTO: 2.41 M/UL (ref 4.21–5.77)
SODIUM SERPL-SCNC: 144 MMOL/L (ref 136–145)
TROPONIN I SERPL HS-MCNC: 43 NG/L (ref 0–22)
WBC OTHER # BLD: 16.9 K/UL (ref 3.5–11.3)

## 2024-04-27 PROCEDURE — 99285 EMERGENCY DEPT VISIT HI MDM: CPT

## 2024-04-27 PROCEDURE — 6360000002 HC RX W HCPCS: Performed by: STUDENT IN AN ORGANIZED HEALTH CARE EDUCATION/TRAINING PROGRAM

## 2024-04-27 PROCEDURE — P9016 RBC LEUKOCYTES REDUCED: HCPCS

## 2024-04-27 PROCEDURE — 80053 COMPREHEN METABOLIC PANEL: CPT

## 2024-04-27 PROCEDURE — 86850 RBC ANTIBODY SCREEN: CPT

## 2024-04-27 PROCEDURE — C9113 INJ PANTOPRAZOLE SODIUM, VIA: HCPCS | Performed by: STUDENT IN AN ORGANIZED HEALTH CARE EDUCATION/TRAINING PROGRAM

## 2024-04-27 PROCEDURE — 6360000004 HC RX CONTRAST MEDICATION: Performed by: STUDENT IN AN ORGANIZED HEALTH CARE EDUCATION/TRAINING PROGRAM

## 2024-04-27 PROCEDURE — 74174 CTA ABD&PLVS W/CONTRAST: CPT

## 2024-04-27 PROCEDURE — 2580000003 HC RX 258: Performed by: STUDENT IN AN ORGANIZED HEALTH CARE EDUCATION/TRAINING PROGRAM

## 2024-04-27 PROCEDURE — 85014 HEMATOCRIT: CPT

## 2024-04-27 PROCEDURE — 86920 COMPATIBILITY TEST SPIN: CPT

## 2024-04-27 PROCEDURE — 83605 ASSAY OF LACTIC ACID: CPT

## 2024-04-27 PROCEDURE — 85018 HEMOGLOBIN: CPT

## 2024-04-27 PROCEDURE — 71045 X-RAY EXAM CHEST 1 VIEW: CPT

## 2024-04-27 PROCEDURE — 2060000000 HC ICU INTERMEDIATE R&B

## 2024-04-27 PROCEDURE — 30233N1 TRANSFUSION OF NONAUTOLOGOUS RED BLOOD CELLS INTO PERIPHERAL VEIN, PERCUTANEOUS APPROACH: ICD-10-PCS | Performed by: INTERNAL MEDICINE

## 2024-04-27 PROCEDURE — 83690 ASSAY OF LIPASE: CPT

## 2024-04-27 PROCEDURE — 86901 BLOOD TYPING SEROLOGIC RH(D): CPT

## 2024-04-27 PROCEDURE — A4216 STERILE WATER/SALINE, 10 ML: HCPCS | Performed by: STUDENT IN AN ORGANIZED HEALTH CARE EDUCATION/TRAINING PROGRAM

## 2024-04-27 PROCEDURE — 86900 BLOOD TYPING SEROLOGIC ABO: CPT

## 2024-04-27 PROCEDURE — 84484 ASSAY OF TROPONIN QUANT: CPT

## 2024-04-27 PROCEDURE — 85027 COMPLETE CBC AUTOMATED: CPT

## 2024-04-27 PROCEDURE — 93005 ELECTROCARDIOGRAM TRACING: CPT | Performed by: STUDENT IN AN ORGANIZED HEALTH CARE EDUCATION/TRAINING PROGRAM

## 2024-04-27 PROCEDURE — 96365 THER/PROPH/DIAG IV INF INIT: CPT

## 2024-04-27 PROCEDURE — 96375 TX/PRO/DX INJ NEW DRUG ADDON: CPT

## 2024-04-27 RX ORDER — 0.9 % SODIUM CHLORIDE 0.9 %
1000 INTRAVENOUS SOLUTION INTRAVENOUS ONCE
Status: COMPLETED | OUTPATIENT
Start: 2024-04-27 | End: 2024-04-27

## 2024-04-27 RX ORDER — SODIUM CHLORIDE 9 MG/ML
INJECTION, SOLUTION INTRAVENOUS PRN
Status: DISCONTINUED | OUTPATIENT
Start: 2024-04-27 | End: 2024-04-29

## 2024-04-27 RX ORDER — SODIUM CHLORIDE 9 MG/ML
INJECTION, SOLUTION INTRAVENOUS PRN
Status: CANCELLED | OUTPATIENT
Start: 2024-04-27

## 2024-04-27 RX ORDER — ONDANSETRON 2 MG/ML
4 INJECTION INTRAMUSCULAR; INTRAVENOUS ONCE
Status: COMPLETED | OUTPATIENT
Start: 2024-04-27 | End: 2024-04-27

## 2024-04-27 RX ADMIN — SODIUM CHLORIDE 1000 ML: 9 INJECTION, SOLUTION INTRAVENOUS at 20:13

## 2024-04-27 RX ADMIN — ONDANSETRON 4 MG: 2 INJECTION INTRAMUSCULAR; INTRAVENOUS at 20:08

## 2024-04-27 RX ADMIN — PANTOPRAZOLE SODIUM 8 MG/HR: 40 INJECTION, POWDER, FOR SOLUTION INTRAVENOUS at 23:47

## 2024-04-27 RX ADMIN — PANTOPRAZOLE SODIUM 40 MG: 40 INJECTION, POWDER, FOR SOLUTION INTRAVENOUS at 20:08

## 2024-04-27 RX ADMIN — IOPAMIDOL 100 ML: 755 INJECTION, SOLUTION INTRAVENOUS at 22:10

## 2024-04-27 RX ADMIN — CEFTRIAXONE SODIUM 1000 MG: 1 INJECTION, POWDER, FOR SOLUTION INTRAMUSCULAR; INTRAVENOUS at 20:39

## 2024-04-27 ASSESSMENT — PAIN - FUNCTIONAL ASSESSMENT: PAIN_FUNCTIONAL_ASSESSMENT: NONE - DENIES PAIN

## 2024-04-27 NOTE — ED NOTES
The following labs were labeled with appropriate pt sticker and tubed to lab:     [x] Blue     [x] Lavender   [] on ice  [x] Green/yellow  [x] Green/black [] on ice  [] Calixto  [] on ice  [x] Yellow  [] Red  [] Pink  [] Type/ Screen  [] ABG  [] VBG    [] COVID-19 swab    [] Rapid  [] PCR  [] Flu swab  [] Peds Viral Panel     [] Urine Sample  [] Fecal Sample  [] Pelvic Cultures  [] Blood Cultures  [] X 2  [] STREP Cultures

## 2024-04-27 NOTE — ED PROVIDER NOTES
[facility] ED  Emergency Department Encounter  Emergency Medicine Resident     Pt Name:Angela Ghotra  MRN: [id number]  Birthdate 1970  Date of evaluation: 4/27/24  PCP:  No primary care provider on file.  Note Started: 7:53 PM EDT      CHIEF COMPLAINT       Chief Complaint   Patient presents with    Hematemesis    Rectal Bleeding       HISTORY OF PRESENT ILLNESS  (Location/Symptom, Timing/Onset, Context/Setting, Quality, Duration, Modifying Factors, Severity.)      Angela Ghotra is a 54 y.o. male who presents with concern for hematemesis.  Patient has a history of cirrhosis, history of alcohol use.  Patient was admitted earlier this month for GI bleed.  Patient has scope that did not do any evidence of variceal bleeding.  Patient has had blood requirement in the past for GI bleeding.  Patient symptoms today started abruptly, had 1 episode of bloody emesis prior to coming to the emergency department.  Additionally had 1 episode of bloody emesis while in the emergency department.  He is complaining of some mild nausea.  He is not complaining of any chest pain, no shortness of breath no fevers chills, he does have some mild abdominal pain as well.    PAST MEDICAL / SURGICAL / SOCIAL / FAMILY HISTORY      has a past medical history of Hypertension.       has a past surgical history that includes back surgery; Esophagogastroduodenoscopy (04/08/2024); and Upper gastrointestinal endoscopy (N/A, 4/8/2024).      Social History     Socioeconomic History    Marital status: Single     Spouse name: Not on file    Number of children: Not on file    Years of education: Not on file    Highest education level: Not on file   Occupational History    Not on file   Tobacco Use    Smoking status: Former    Smokeless tobacco: Never   Substance and Sexual Activity    Alcohol use: Yes     Comment: daily    Drug use: No    Sexual activity: Not on file   Other Topics Concern    Not on file   Social History Narrative    Not on  time.    Amount and/or Complexity of Data Reviewed  Labs: ordered. Decision-making details documented in ED Course.  Radiology: ordered.  ECG/medicine tests: ordered.    Risk  Prescription drug management.  Decision regarding hospitalization.        CTA ABDOMEN PELVIS W WO CONTRAST    Result Date: 4/27/2024  EXAMINATION: CTA OF THE ABDOMEN AND PELVIS WITH AND WITHOUT CONTRAST 4/27/2024 9:09 pm: TECHNIQUE: CTA of the abdomen and pelvis was performed without and with the administration of intravenous contrast. Multiplanar reformatted images are provided for review.  MIP images are provided for review. Automated exposure control, iterative reconstruction, and/or weight based adjustment of the mA/kV was utilized to reduce the radiation dose to as low as reasonably achievable. COMPARISON: None. HISTORY: ORDERING SYSTEM PROVIDED HISTORY: eval GI bleed TECHNOLOGIST PROVIDED HISTORY: eval GI bleed Additional Contrast?->1 Reason for Exam: eval GI bleed VASCULAR: The abdominal aorta is normal in caliber. No dissection. The visceral branches are patent without evidence for stenosis. The iliac vessels are normal in caliber and well opacified.  The visualized femoral arterial system is well opacified. Nonvascular: The visualized lung bases reveal no acute findings. The liver, gallbladder, kidneys, and adrenals reveal no acute findings. Fluid is noted around the distal body and tail the pancreas extending to the splenic hilum and nearby posterior gastric wall.  Mild edematous appearance of the nearby splenic flexure.  There is an area of probable subcapsular versus parenchymal low-density within the posterolateral spleen (series 7, image 42) that may be secondary to the pancreatic finding. Diverticulosis.  Other than the area of mild apparent edema in the splenic flexure near the pancreatic inflammatory change, no bowel wall thickening appreciated.  No intraluminal blood products or evidence for intraluminal contrast  cells demonstrate no acute abnormality. BONES/SOFT TISSUES: The bone marrow signal intensity appears normal. The soft tissues demonstrate no acute abnormality. MRA NECK: AORTIC ARCH/ARCH VESSELS: No dissection or arterial injury.  No significant stenosis of the brachiocephalic or subclavian arteries. CAROTID ARTERIES: No dissection, arterial injury, or hemodynamically significant stenosis by NASCET criteria. VERTEBRAL ARTERIES: No dissection, arterial injury, or significant stenosis. MRA HEAD: ANTERIOR CIRCULATION: No significant stenosis of the intracranial internal carotid, anterior cerebral, or middle cerebral arteries. POSTERIOR CIRCULATION: No significant stenosis of the vertebral, basilar, or posterior cerebral arteries.     1. No acute intracranial abnormality. 2. Unremarkable MRA of the head and neck.     CT HEAD WO CONTRAST    Addendum Date: 4/4/2024    ADDENDUM: Findings were discussed with Dr. Aleksey Lindo at 5:03 pm on 4/4/2024.     Result Date: 4/4/2024  EXAMINATION: CT OF THE HEAD WITHOUT CONTRAST  4/4/2024 4:40 pm TECHNIQUE: CT of the head was performed without the administration of intravenous contrast. Automated exposure control, iterative reconstruction, and/or weight based adjustment of the mA/kV was utilized to reduce the radiation dose to as low as reasonably achievable. COMPARISON: None. HISTORY: ORDERING SYSTEM PROVIDED HISTORY: vision loss, dilated nonreactive right pupil TECHNOLOGIST PROVIDED HISTORY: vision loss, dilated nonreactive right pupil Decision Support Exception - unselect if not a suspected or confirmed emergency medical condition->Emergency Medical Condition (MA) Reason for Exam: vision loss, dilated non-reactive right pupil. stroke alert/ FINDINGS: BRAIN/VENTRICLES: There is no acute intracranial hemorrhage, mass effect or midline shift. No abnormal extra-axial fluid collection.  Left basal ganglionic encephalomalacia changes are seen.  The gray-white differentiation is  maintained without evidence of an acute infarct. There is prominence of the ventricles and sulci due to global parenchymal volume loss.  There are nonspecific areas of hypoattenuation within the periventricular and subcortical white matter, which likely represent chronic microvascular ischemic change. ORBITS: The visualized portion of the orbits demonstrate no acute abnormality. SINUSES: The visualized paranasal sinuses and mastoid air cells demonstrate no acute abnormality. SOFT TISSUES/SKULL: No acute abnormality of the visualized skull or soft tissues.     No acute intracranial abnormality.        EMERGENCY DEPARTMENT COURSE:      ED Course as of 04/27/24 2356   Sat Apr 27, 2024 1951 Hematemesis started today, hx of gi bleed admitted on the 4th of April, HDS today tachy, epigastric pain today, will order labs check hb, zofran protonix fluids, consider blood if unstable or Hb low.     Low nico for pulm etiology for bleed given history  Cardiac workup [KK]   1953 Pt nontoxic at bedside [KK]   2020 Patient has a hemoglobin of 6, elevated white blood cell count, SIRS positive, will start on Rocephin, fluid resuscitation, admit to medicine. [KK]   2125 GI protocol CT a obtained. [KK]   2258 Will start patient on Protonix drip, admit to medicine. [KK]   2329 WBC(!): 16.9 [KK]   [facility] [KK]      ED Course User Index  [KK] Eduardo Lin,        CONSULTS:  IP CONSULT TO HOSPITALIST  IP CONSULT TO GI    CRITICAL CARE:  There was significant risk of life threatening deterioration of patient's condition requiring my direct management. Critical care time 35 minutes, excluding any documented procedures.    FINAL IMPRESSION      1. Gastrointestinal hemorrhage, unspecified gastrointestinal hemorrhage type          DISPOSITION / PLAN     DISPOSITION Admitted 04/27/2024 11:32:22 PM      PATIENT REFERRED TO:  No follow-up provider specified.    DISCHARGE MEDICATIONS:  New

## 2024-04-27 NOTE — ED PROVIDER NOTES
[facility] ED     Emergency Department     Faculty Attestation    I performed a history and physical examination of the patient and discussed management with the resident. I reviewed the resident’s note and agree with the documented findings and plan of care. Any areas of disagreement are noted on the chart. I was personally present for the key portions of any procedures. I have documented in the chart those procedures where I was not present during the key portions. I have reviewed the emergency nurses triage note. I agree with the chief complaint, past medical history, past surgical history, allergies, medications, social and family history as documented unless otherwise noted below. For Physician Assistant/ Nurse Practitioner cases/documentation I have personally evaluated this patient and have completed at least one if not all key elements of the E/M (history, physical exam, and MDM). Additional findings are as noted.    Note Started: 7:44 PM EDT    Patient with concerns for vomiting blood and blood in stools.  Recent visit for same although he is not able to provide any details.  Did complain of some shortness of breath to EMS but that has resolved no chest pain.  Does complain of mild abdominal pain.  Denies anticoagulation.  History of alcohol abuse.  On exam resting comfortably watching TV.  Lungs clear heart regular equal radial pedal pulses.  Abdomen soft no focal tenderness rebound guarding or pulsatile abdominal mass.  Will check labs EKG review recent workup, probable admit    EKG interpretation: Sinus rhythm 99.  Normal intervals normal axis.  There are 2 PVC.  No acute ST or T changes.  No acute findings    Critical Care     CRITICAL CARE: There was a high probability of clinically significant/life threatening deterioration in this patient's condition which required my urgent intervention.  Total critical care time was 15 minutes.  This excludes any time for separately reportable  procedures.       Camden Murillo MD, FACEP, FAAEM  Attending Emergency  Physician           Camden Murillo MD  04/27/24 2034

## 2024-04-28 ENCOUNTER — ANESTHESIA EVENT (OUTPATIENT)
Dept: OPERATING ROOM | Age: 54
End: 2024-04-28
Payer: COMMERCIAL

## 2024-04-28 ENCOUNTER — ANESTHESIA (OUTPATIENT)
Dept: OPERATING ROOM | Age: 54
End: 2024-04-28
Payer: COMMERCIAL

## 2024-04-28 PROBLEM — K92.0 HEMATEMESIS: Status: ACTIVE | Noted: 2024-04-28

## 2024-04-28 PROBLEM — R79.89 ELEVATED TROPONIN: Status: ACTIVE | Noted: 2024-04-28

## 2024-04-28 PROBLEM — N18.9 CKD (CHRONIC KIDNEY DISEASE): Status: ACTIVE | Noted: 2024-04-28

## 2024-04-28 PROBLEM — E87.6 HYPOKALEMIA: Status: ACTIVE | Noted: 2024-04-28

## 2024-04-28 PROBLEM — D50.0 BLOOD LOSS ANEMIA: Status: ACTIVE | Noted: 2024-04-28

## 2024-04-28 PROBLEM — K92.1 MELENA: Status: ACTIVE | Noted: 2024-04-28

## 2024-04-28 PROBLEM — K29.70 GASTRITIS: Status: ACTIVE | Noted: 2024-04-28

## 2024-04-28 PROBLEM — R10.13 EPIGASTRIC PAIN: Status: ACTIVE | Noted: 2024-04-28

## 2024-04-28 LAB
ANION GAP SERPL CALCULATED.3IONS-SCNC: 8 MMOL/L (ref 9–16)
BASOPHILS # BLD: 0.03 K/UL (ref 0–0.2)
BASOPHILS NFR BLD: 0 % (ref 0–2)
BUN SERPL-MCNC: 36 MG/DL (ref 6–20)
CALCIUM SERPL-MCNC: 7.7 MG/DL (ref 8.6–10.4)
CHLORIDE SERPL-SCNC: 114 MMOL/L (ref 98–107)
CO2 SERPL-SCNC: 18 MMOL/L (ref 20–31)
CREAT SERPL-MCNC: 1.6 MG/DL (ref 0.7–1.2)
EOSINOPHIL # BLD: 0.1 K/UL (ref 0–0.44)
EOSINOPHILS RELATIVE PERCENT: 1 % (ref 1–4)
ERYTHROCYTE [DISTWIDTH] IN BLOOD BY AUTOMATED COUNT: 18 % (ref 11.8–14.4)
GFR SERPL CREATININE-BSD FRML MDRD: 50 ML/MIN/1.73M2
GLUCOSE SERPL-MCNC: 120 MG/DL (ref 74–99)
HCT VFR BLD AUTO: 17.6 % (ref 40.7–50.3)
HCT VFR BLD AUTO: 20.7 % (ref 40.7–50.3)
HCT VFR BLD AUTO: 23.6 % (ref 40.7–50.3)
HCT VFR BLD AUTO: 24.1 % (ref 40.7–50.3)
HCT VFR BLD AUTO: 24.9 % (ref 40.7–50.3)
HGB BLD-MCNC: 5.6 G/DL (ref 13–17)
HGB BLD-MCNC: 6.3 G/DL (ref 13–17)
HGB BLD-MCNC: 7.1 G/DL (ref 13–17)
HGB BLD-MCNC: 7.6 G/DL (ref 13–17)
HGB BLD-MCNC: 7.7 G/DL (ref 13–17)
IMM GRANULOCYTES # BLD AUTO: 0.07 K/UL (ref 0–0.3)
IMM GRANULOCYTES NFR BLD: 1 %
INR PPP: 1.3
IRON SATN MFR SERPL: 85 % (ref 20–55)
IRON SERPL-MCNC: 184 UG/DL (ref 61–157)
LACTIC ACID, WHOLE BLOOD: 0.9 MMOL/L (ref 0.7–2.1)
LACTIC ACID, WHOLE BLOOD: 1.3 MMOL/L (ref 0.7–2.1)
LACTIC ACID, WHOLE BLOOD: 2.9 MMOL/L (ref 0.7–2.1)
LYMPHOCYTES NFR BLD: 1.2 K/UL (ref 1.1–3.7)
LYMPHOCYTES RELATIVE PERCENT: 8 % (ref 24–43)
MCH RBC QN AUTO: 26.5 PG (ref 25.2–33.5)
MCHC RBC AUTO-ENTMCNC: 30.1 G/DL (ref 28.4–34.8)
MCV RBC AUTO: 88.1 FL (ref 82.6–102.9)
MONOCYTES NFR BLD: 0.89 K/UL (ref 0.1–1.2)
MONOCYTES NFR BLD: 6 % (ref 3–12)
MYOGLOBIN SERPL-MCNC: 59 NG/ML (ref 28–72)
MYOGLOBIN SERPL-MCNC: 67 NG/ML (ref 28–72)
MYOGLOBIN SERPL-MCNC: 97 NG/ML (ref 28–72)
NEUTROPHILS NFR BLD: 84 % (ref 36–65)
NEUTS SEG NFR BLD: 12.11 K/UL (ref 1.5–8.1)
NRBC BLD-RTO: 0.3 PER 100 WBC
PARTIAL THROMBOPLASTIN TIME: 28.5 SEC (ref 23–36.5)
PLATELET # BLD AUTO: 117 K/UL (ref 138–453)
PMV BLD AUTO: 10.2 FL (ref 8.1–13.5)
POTASSIUM SERPL-SCNC: 4.1 MMOL/L (ref 3.7–5.3)
PROTHROMBIN TIME: 15.7 SEC (ref 11.7–14.9)
RBC # BLD AUTO: 2.68 M/UL (ref 4.21–5.77)
RBC # BLD: ABNORMAL 10*6/UL
SODIUM SERPL-SCNC: 140 MMOL/L (ref 136–145)
TIBC SERPL-MCNC: 217 UG/DL (ref 250–450)
TROPONIN I SERPL HS-MCNC: 51 NG/L (ref 0–22)
TROPONIN I SERPL HS-MCNC: 64 NG/L (ref 0–22)
TROPONIN I SERPL HS-MCNC: 67 NG/L (ref 0–22)
UNSATURATED IRON BINDING CAPACITY: 33 UG/DL (ref 112–347)
WBC OTHER # BLD: 14.4 K/UL (ref 3.5–11.3)

## 2024-04-28 PROCEDURE — 87641 MR-STAPH DNA AMP PROBE: CPT

## 2024-04-28 PROCEDURE — 99254 IP/OBS CNSLTJ NEW/EST MOD 60: CPT | Performed by: INTERNAL MEDICINE

## 2024-04-28 PROCEDURE — 80048 BASIC METABOLIC PNL TOTAL CA: CPT

## 2024-04-28 PROCEDURE — C9113 INJ PANTOPRAZOLE SODIUM, VIA: HCPCS | Performed by: NURSE PRACTITIONER

## 2024-04-28 PROCEDURE — 36415 COLL VENOUS BLD VENIPUNCTURE: CPT

## 2024-04-28 PROCEDURE — 99291 CRITICAL CARE FIRST HOUR: CPT | Performed by: INTERNAL MEDICINE

## 2024-04-28 PROCEDURE — 6370000000 HC RX 637 (ALT 250 FOR IP): Performed by: NURSE PRACTITIONER

## 2024-04-28 PROCEDURE — 2580000003 HC RX 258: Performed by: NURSE PRACTITIONER

## 2024-04-28 PROCEDURE — 2500000003 HC RX 250 WO HCPCS: Performed by: NURSE ANESTHETIST, CERTIFIED REGISTERED

## 2024-04-28 PROCEDURE — 0DJD8ZZ INSPECTION OF LOWER INTESTINAL TRACT, VIA NATURAL OR ARTIFICIAL OPENING ENDOSCOPIC: ICD-10-PCS | Performed by: INTERNAL MEDICINE

## 2024-04-28 PROCEDURE — P9016 RBC LEUKOCYTES REDUCED: HCPCS

## 2024-04-28 PROCEDURE — 3700000000 HC ANESTHESIA ATTENDED CARE: Performed by: INTERNAL MEDICINE

## 2024-04-28 PROCEDURE — 6360000002 HC RX W HCPCS: Performed by: INTERNAL MEDICINE

## 2024-04-28 PROCEDURE — 85610 PROTHROMBIN TIME: CPT

## 2024-04-28 PROCEDURE — 2580000003 HC RX 258: Performed by: INTERNAL MEDICINE

## 2024-04-28 PROCEDURE — 83550 IRON BINDING TEST: CPT

## 2024-04-28 PROCEDURE — 0DJ08ZZ INSPECTION OF UPPER INTESTINAL TRACT, VIA NATURAL OR ARTIFICIAL OPENING ENDOSCOPIC: ICD-10-PCS | Performed by: INTERNAL MEDICINE

## 2024-04-28 PROCEDURE — 2T015 HOSPITALIST 2ND TOUCH: CPT | Performed by: STUDENT IN AN ORGANIZED HEALTH CARE EDUCATION/TRAINING PROGRAM

## 2024-04-28 PROCEDURE — 85025 COMPLETE CBC W/AUTO DIFF WBC: CPT

## 2024-04-28 PROCEDURE — 6360000002 HC RX W HCPCS: Performed by: NURSE ANESTHETIST, CERTIFIED REGISTERED

## 2024-04-28 PROCEDURE — 3609017100 HC EGD: Performed by: INTERNAL MEDICINE

## 2024-04-28 PROCEDURE — 94761 N-INVAS EAR/PLS OXIMETRY MLT: CPT

## 2024-04-28 PROCEDURE — 85730 THROMBOPLASTIN TIME PARTIAL: CPT

## 2024-04-28 PROCEDURE — 85018 HEMOGLOBIN: CPT

## 2024-04-28 PROCEDURE — 83874 ASSAY OF MYOGLOBIN: CPT

## 2024-04-28 PROCEDURE — 3700000001 HC ADD 15 MINUTES (ANESTHESIA): Performed by: INTERNAL MEDICINE

## 2024-04-28 PROCEDURE — 7100000001 HC PACU RECOVERY - ADDTL 15 MIN: Performed by: INTERNAL MEDICINE

## 2024-04-28 PROCEDURE — 2580000003 HC RX 258: Performed by: ANESTHESIOLOGY

## 2024-04-28 PROCEDURE — 85014 HEMATOCRIT: CPT

## 2024-04-28 PROCEDURE — 2500000003 HC RX 250 WO HCPCS: Performed by: NURSE PRACTITIONER

## 2024-04-28 PROCEDURE — 83540 ASSAY OF IRON: CPT

## 2024-04-28 PROCEDURE — 99222 1ST HOSP IP/OBS MODERATE 55: CPT | Performed by: STUDENT IN AN ORGANIZED HEALTH CARE EDUCATION/TRAINING PROGRAM

## 2024-04-28 PROCEDURE — 2000000000 HC ICU R&B

## 2024-04-28 PROCEDURE — 6360000002 HC RX W HCPCS: Performed by: NURSE PRACTITIONER

## 2024-04-28 PROCEDURE — 7100000000 HC PACU RECOVERY - FIRST 15 MIN: Performed by: INTERNAL MEDICINE

## 2024-04-28 PROCEDURE — 6360000002 HC RX W HCPCS: Performed by: STUDENT IN AN ORGANIZED HEALTH CARE EDUCATION/TRAINING PROGRAM

## 2024-04-28 PROCEDURE — 2580000003 HC RX 258: Performed by: NURSE ANESTHETIST, CERTIFIED REGISTERED

## 2024-04-28 PROCEDURE — 36430 TRANSFUSION BLD/BLD COMPNT: CPT

## 2024-04-28 PROCEDURE — 2580000003 HC RX 258: Performed by: STUDENT IN AN ORGANIZED HEALTH CARE EDUCATION/TRAINING PROGRAM

## 2024-04-28 PROCEDURE — 6370000000 HC RX 637 (ALT 250 FOR IP): Performed by: INTERNAL MEDICINE

## 2024-04-28 PROCEDURE — 83605 ASSAY OF LACTIC ACID: CPT

## 2024-04-28 PROCEDURE — 84484 ASSAY OF TROPONIN QUANT: CPT

## 2024-04-28 RX ORDER — SODIUM CHLORIDE 9 MG/ML
INJECTION, SOLUTION INTRAVENOUS CONTINUOUS
Status: DISCONTINUED | OUTPATIENT
Start: 2024-04-28 | End: 2024-04-30

## 2024-04-28 RX ORDER — SODIUM CHLORIDE 9 MG/ML
INJECTION, SOLUTION INTRAVENOUS PRN
Status: DISCONTINUED | OUTPATIENT
Start: 2024-04-28 | End: 2024-04-29

## 2024-04-28 RX ORDER — CALCIUM GLUCONATE 20 MG/ML
1000 INJECTION, SOLUTION INTRAVENOUS ONCE
Status: COMPLETED | OUTPATIENT
Start: 2024-04-28 | End: 2024-04-28

## 2024-04-28 RX ORDER — ONDANSETRON 4 MG/1
4 TABLET, ORALLY DISINTEGRATING ORAL EVERY 8 HOURS PRN
Status: DISCONTINUED | OUTPATIENT
Start: 2024-04-28 | End: 2024-05-10 | Stop reason: HOSPADM

## 2024-04-28 RX ORDER — LANOLIN ALCOHOL/MO/W.PET/CERES
100 CREAM (GRAM) TOPICAL DAILY
Status: DISCONTINUED | OUTPATIENT
Start: 2024-04-28 | End: 2024-05-10 | Stop reason: HOSPADM

## 2024-04-28 RX ORDER — SODIUM CHLORIDE 0.9 % (FLUSH) 0.9 %
5-40 SYRINGE (ML) INJECTION EVERY 12 HOURS SCHEDULED
Status: DISCONTINUED | OUTPATIENT
Start: 2024-04-28 | End: 2024-04-30 | Stop reason: HOSPADM

## 2024-04-28 RX ORDER — ONDANSETRON 2 MG/ML
4 INJECTION INTRAMUSCULAR; INTRAVENOUS EVERY 6 HOURS PRN
Status: DISCONTINUED | OUTPATIENT
Start: 2024-04-28 | End: 2024-05-10 | Stop reason: HOSPADM

## 2024-04-28 RX ORDER — ACETAMINOPHEN 650 MG/1
650 SUPPOSITORY RECTAL EVERY 6 HOURS PRN
Status: DISCONTINUED | OUTPATIENT
Start: 2024-04-28 | End: 2024-05-10 | Stop reason: HOSPADM

## 2024-04-28 RX ORDER — SODIUM CHLORIDE 0.9 % (FLUSH) 0.9 %
5-40 SYRINGE (ML) INJECTION PRN
Status: DISCONTINUED | OUTPATIENT
Start: 2024-04-28 | End: 2024-05-10 | Stop reason: HOSPADM

## 2024-04-28 RX ORDER — SUCRALFATE 1 G/1
1 TABLET ORAL 4 TIMES DAILY
Status: DISCONTINUED | OUTPATIENT
Start: 2024-04-28 | End: 2024-05-10 | Stop reason: HOSPADM

## 2024-04-28 RX ORDER — FENTANYL CITRATE 50 UG/ML
INJECTION, SOLUTION INTRAMUSCULAR; INTRAVENOUS PRN
Status: DISCONTINUED | OUTPATIENT
Start: 2024-04-28 | End: 2024-04-28 | Stop reason: SDUPTHER

## 2024-04-28 RX ORDER — METOPROLOL SUCCINATE 25 MG/1
25 TABLET, EXTENDED RELEASE ORAL DAILY
Status: DISCONTINUED | OUTPATIENT
Start: 2024-04-28 | End: 2024-05-10 | Stop reason: HOSPADM

## 2024-04-28 RX ORDER — NALOXONE HYDROCHLORIDE 0.4 MG/ML
INJECTION, SOLUTION INTRAMUSCULAR; INTRAVENOUS; SUBCUTANEOUS PRN
Status: DISCONTINUED | OUTPATIENT
Start: 2024-04-28 | End: 2024-04-30 | Stop reason: HOSPADM

## 2024-04-28 RX ORDER — SODIUM CHLORIDE, SODIUM LACTATE, POTASSIUM CHLORIDE, CALCIUM CHLORIDE 600; 310; 30; 20 MG/100ML; MG/100ML; MG/100ML; MG/100ML
INJECTION, SOLUTION INTRAVENOUS CONTINUOUS PRN
Status: DISCONTINUED | OUTPATIENT
Start: 2024-04-28 | End: 2024-04-28 | Stop reason: SDUPTHER

## 2024-04-28 RX ORDER — FOLIC ACID 1 MG/1
1 TABLET ORAL DAILY
Status: DISCONTINUED | OUTPATIENT
Start: 2024-04-28 | End: 2024-05-10 | Stop reason: HOSPADM

## 2024-04-28 RX ORDER — SODIUM CHLORIDE 9 MG/ML
INJECTION, SOLUTION INTRAVENOUS PRN
Status: DISCONTINUED | OUTPATIENT
Start: 2024-04-28 | End: 2024-05-10 | Stop reason: HOSPADM

## 2024-04-28 RX ORDER — SODIUM CHLORIDE 9 MG/ML
INJECTION, SOLUTION INTRAVENOUS PRN
Status: DISCONTINUED | OUTPATIENT
Start: 2024-04-28 | End: 2024-04-30 | Stop reason: HOSPADM

## 2024-04-28 RX ORDER — M-VIT,TX,IRON,MINS/CALC/FOLIC 27MG-0.4MG
1 TABLET ORAL DAILY
Status: DISCONTINUED | OUTPATIENT
Start: 2024-04-28 | End: 2024-05-10 | Stop reason: HOSPADM

## 2024-04-28 RX ORDER — POTASSIUM CHLORIDE 7.45 MG/ML
10 INJECTION INTRAVENOUS
Status: COMPLETED | OUTPATIENT
Start: 2024-04-28 | End: 2024-04-28

## 2024-04-28 RX ORDER — ATORVASTATIN CALCIUM 40 MG/1
40 TABLET, FILM COATED ORAL NIGHTLY
Status: DISCONTINUED | OUTPATIENT
Start: 2024-04-28 | End: 2024-05-10 | Stop reason: HOSPADM

## 2024-04-28 RX ORDER — SODIUM CHLORIDE 0.9 % (FLUSH) 0.9 %
5-40 SYRINGE (ML) INJECTION EVERY 12 HOURS SCHEDULED
Status: DISCONTINUED | OUTPATIENT
Start: 2024-04-28 | End: 2024-05-10 | Stop reason: HOSPADM

## 2024-04-28 RX ORDER — ACETAMINOPHEN 325 MG/1
650 TABLET ORAL EVERY 6 HOURS PRN
Status: DISCONTINUED | OUTPATIENT
Start: 2024-04-28 | End: 2024-05-10 | Stop reason: HOSPADM

## 2024-04-28 RX ORDER — LIDOCAINE HYDROCHLORIDE 10 MG/ML
INJECTION, SOLUTION EPIDURAL; INFILTRATION; INTRACAUDAL; PERINEURAL PRN
Status: DISCONTINUED | OUTPATIENT
Start: 2024-04-28 | End: 2024-04-28 | Stop reason: SDUPTHER

## 2024-04-28 RX ORDER — ONDANSETRON 2 MG/ML
4 INJECTION INTRAMUSCULAR; INTRAVENOUS
Status: DISPENSED | OUTPATIENT
Start: 2024-04-28 | End: 2024-04-29

## 2024-04-28 RX ORDER — PROPOFOL 10 MG/ML
INJECTION, EMULSION INTRAVENOUS PRN
Status: DISCONTINUED | OUTPATIENT
Start: 2024-04-28 | End: 2024-04-28 | Stop reason: SDUPTHER

## 2024-04-28 RX ORDER — SODIUM CHLORIDE 0.9 % (FLUSH) 0.9 %
5-40 SYRINGE (ML) INJECTION PRN
Status: DISCONTINUED | OUTPATIENT
Start: 2024-04-28 | End: 2024-04-30 | Stop reason: HOSPADM

## 2024-04-28 RX ADMIN — SODIUM CHLORIDE: 9 INJECTION, SOLUTION INTRAVENOUS at 00:57

## 2024-04-28 RX ADMIN — PROPOFOL 30 MG: 10 INJECTION, EMULSION INTRAVENOUS at 12:23

## 2024-04-28 RX ADMIN — SODIUM CHLORIDE, PRESERVATIVE FREE 10 ML: 5 INJECTION INTRAVENOUS at 20:46

## 2024-04-28 RX ADMIN — PROPOFOL 80 MG: 10 INJECTION, EMULSION INTRAVENOUS at 12:12

## 2024-04-28 RX ADMIN — FENTANYL CITRATE 25 MCG: 50 INJECTION, SOLUTION INTRAMUSCULAR; INTRAVENOUS at 12:17

## 2024-04-28 RX ADMIN — OCTREOTIDE ACETATE 50 MCG/HR: 500 INJECTION, SOLUTION INTRAVENOUS; SUBCUTANEOUS at 18:43

## 2024-04-28 RX ADMIN — POTASSIUM CHLORIDE 10 MEQ: 10 INJECTION, SOLUTION INTRAVENOUS at 05:13

## 2024-04-28 RX ADMIN — OCTREOTIDE ACETATE 50 MCG/HR: 500 INJECTION, SOLUTION INTRAVENOUS; SUBCUTANEOUS at 11:49

## 2024-04-28 RX ADMIN — PANTOPRAZOLE SODIUM 8 MG/HR: 40 INJECTION, POWDER, FOR SOLUTION INTRAVENOUS at 10:05

## 2024-04-28 RX ADMIN — SUCRALFATE 1 G: 1 TABLET ORAL at 20:46

## 2024-04-28 RX ADMIN — ATORVASTATIN CALCIUM 40 MG: 40 TABLET, FILM COATED ORAL at 01:39

## 2024-04-28 RX ADMIN — SUCRALFATE 1 G: 1 TABLET ORAL at 01:39

## 2024-04-28 RX ADMIN — PROPOFOL 30 MG: 10 INJECTION, EMULSION INTRAVENOUS at 12:14

## 2024-04-28 RX ADMIN — POTASSIUM CHLORIDE 10 MEQ: 10 INJECTION, SOLUTION INTRAVENOUS at 01:10

## 2024-04-28 RX ADMIN — POTASSIUM CHLORIDE 10 MEQ: 10 INJECTION, SOLUTION INTRAVENOUS at 03:08

## 2024-04-28 RX ADMIN — POTASSIUM CHLORIDE 10 MEQ: 10 INJECTION, SOLUTION INTRAVENOUS at 06:49

## 2024-04-28 RX ADMIN — ONDANSETRON 4 MG: 2 INJECTION INTRAMUSCULAR; INTRAVENOUS at 21:16

## 2024-04-28 RX ADMIN — PROPOFOL 50 MG: 10 INJECTION, EMULSION INTRAVENOUS at 12:19

## 2024-04-28 RX ADMIN — LIDOCAINE HYDROCHLORIDE 50 MG: 10 INJECTION, SOLUTION EPIDURAL; INFILTRATION; INTRACAUDAL; PERINEURAL at 12:12

## 2024-04-28 RX ADMIN — ATORVASTATIN CALCIUM 40 MG: 40 TABLET, FILM COATED ORAL at 20:49

## 2024-04-28 RX ADMIN — OCTREOTIDE ACETATE 25 MCG/HR: 500 INJECTION, SOLUTION INTRAVENOUS; SUBCUTANEOUS at 04:27

## 2024-04-28 RX ADMIN — FENTANYL CITRATE 25 MCG: 50 INJECTION, SOLUTION INTRAMUSCULAR; INTRAVENOUS at 12:21

## 2024-04-28 RX ADMIN — SODIUM CHLORIDE, POTASSIUM CHLORIDE, SODIUM LACTATE AND CALCIUM CHLORIDE: 600; 310; 30; 20 INJECTION, SOLUTION INTRAVENOUS at 12:06

## 2024-04-28 RX ADMIN — CALCIUM GLUCONATE 1000 MG: 20 INJECTION, SOLUTION INTRAVENOUS at 10:06

## 2024-04-28 RX ADMIN — FENTANYL CITRATE 50 MCG: 50 INJECTION, SOLUTION INTRAMUSCULAR; INTRAVENOUS at 12:12

## 2024-04-28 RX ADMIN — SODIUM CHLORIDE, PRESERVATIVE FREE 10 ML: 5 INJECTION INTRAVENOUS at 20:47

## 2024-04-28 ASSESSMENT — PAIN - FUNCTIONAL ASSESSMENT: PAIN_FUNCTIONAL_ASSESSMENT: FACE, LEGS, ACTIVITY, CRY, AND CONSOLABILITY (FLACC)

## 2024-04-28 NOTE — CARE COORDINATION
Case Management Assessment  Initial Evaluation    Date/Time of Evaluation: 4/28/2024 7:45 AM  Assessment Completed by: Ml Huitron RN    If patient is discharged prior to next notation, then this note serves as note for discharge by case management.    Patient Name: Angela Ghotra                   YOB: 1970  Diagnosis: GI bleed [K92.2]  Gastrointestinal hemorrhage, unspecified gastrointestinal hemorrhage type [K92.2]                   Date / Time: 4/27/2024  7:41 PM    Patient Admission Status: Inpatient   Readmission Risk (Low < 19, Mod (19-27), High > 27): Readmission Risk Score: 22.6    Current PCP: No primary care provider on file.  PCP verified by CM? No (has no pcp.  clinic list provided)    Chart Reviewed: Yes      History Provided by: Patient  Patient Orientation: Alert and Oriented    Patient Cognition: Alert    Hospitalization in the last 30 days (Readmission):  Yes    If yes, Readmission Assessment in CM Navigator will be completed.    Advance Directives:      Code Status: Full Code   Patient's Primary Decision Maker is: Legal Next of Kin      Discharge Planning:    Patient lives with: Other (Comment) (roomates) Type of Home: Shelter  Primary Care Giver: Self  Patient Support Systems include: Family Members   Current Financial resources:  (commercial)  Current community resources: None  Current services prior to admission: None            Current DME:              Type of Home Care services:  None    ADLS  Prior functional level: Independent in ADLs/IADLs  Current functional level: Independent in ADLs/IADLs    PT AM-PAC:   /24  OT AM-PAC:   /24    Family can provide assistance at DC: No  Would you like Case Management to discuss the discharge plan with any other family members/significant others, and if so, who? No  Plans to Return to Present Housing: Yes  Other Identified Issues/Barriers to RETURNING to current housing: none  Potential Assistance needed at discharge: Transportation             Potential DME:    Patient expects to discharge to: Shelter  Plan for transportation at discharge:      Financial    Payor: [employer] / Plan: [employer] - OPEN ACCESS (HMO) / Product Type: *No Product type* /     Does insurance require precert for SNF: Yes    Potential assistance Purchasing Medications:    Meds-to-Beds request:        [facility] [id number] - [place], [place] - [address] - P [phone] - F [phone]  [address]  [place] [address]  Phone: [phone] Fax: [phone]    [facility] - [place], [place] - [address] - P [phone] - F [phone]  [address] [facility] [place] [address]  Phone: [phone] Fax: [phone]      Notes:    Factors facilitating achievement of predicted outcomes: Cooperative    Barriers to discharge: Limited family support    Additional Case Management Notes: patient is from shelter and plans on returning at discharge    The Plan for Transition of Care is related to the following treatment goals of GI bleed [K92.2]  Gastrointestinal hemorrhage, unspecified gastrointestinal hemorrhage type [K92.2]    IF APPLICABLE: The Patient and/or patient representative Ali and his family were provided with a choice of provider and agrees with the discharge plan. Freedom of choice list with basic dialogue that supports the patient's individualized plan of care/goals and shares the quality data associated with the providers was provided to: Patient   Patient Representative Name:       The Patient and/or Patient Representative Agree with the Discharge Plan? Yes    Ml Huitron RN  Case Management Department  Ph: [phone] Fax:

## 2024-04-28 NOTE — ED NOTES
ED to inpatient nurses report      Chief Complaint:  Chief Complaint   Patient presents with    Hematemesis    Rectal Bleeding     Present to ED from: EMS from [facility]    MOA:     LOC: alert and orientated to name, place, date  Mobility: Independent  Oxygen Baseline: RA    Current needs required: n/a   Pending ED orders: none  Present condition: stable    Why did the patient come to the ED?   Pt presents to the ED via EMS from [facility] with c/o SIOB and hematemesis.   Pt states that he has been having blood in his stool and vomit for the past few days.   Pt also reports a worsening shortness of breath over the past several days.   While walking from ambulance to ED, pt had an episode of vomiting with gross blood and clots seen.   Pt does appear to be mildly tachypneic. No distress noted. Pt denies any abd pain.   Pt changed into gown, placed on cardiac monitor, EKG done, IV established and labs drawn.   Call light within reach.   What is the plan?   Admission  Repeat H&H  Possible additional transfusion  Any procedures or intervention occur?   Possible blood transfusion  Any safety concerns??    Repeat Hbg, 1 hr post transfusion 6.7.     Mental Status:  Level of Consciousness: Alert (0)    Psych Assessment:   Psychosocial  Psychosocial (WDL): Within Defined Limits  Vital signs   Vitals:    04/27/24 2125 04/27/24 2130 04/27/24 2135 04/27/24 2140   BP: (!) 154/83 (!) 142/80 132/77 (!) 148/84   Pulse: 94 94 94 99   Resp: 22 18 19 19   Temp: 98.7 °F (37.1 °C) 99 °F (37.2 °C) 99 °F (37.2 °C) 99 °F (37.2 °C)   TempSrc: Oral Oral Oral Oral   SpO2: 100% 98% 100% 100%        Vitals:  Patient Vitals for the past 24 hrs:   BP Temp Temp src Pulse Resp SpO2   04/27/24 2140 (!) 148/84 99 °F (37.2 °C) Oral 99 19 100 %   04/27/24 2135 132/77 99 °F (37.2 °C) Oral 94 19 100 %   04/27/24 2130 (!) 142/80 99 °F (37.2 °C) Oral 94 18 98 %   04/27/24 2125 (!) 154/83 98.7 °F (37.1 °C) Oral 94 22 100 %   04/27/24 2121 (!) 155/88    Procedure Laterality Date    BACK SURGERY      ESOPHAGOGASTRODUODENOSCOPY  04/08/2024    UPPER GASTROINTESTINAL ENDOSCOPY N/A 4/8/2024    ESOPHAGOGASTRODUODENOSCOPY BIOPSY performed by Jose Raul Cuevas MD at [facility]       PAST MEDICAL HISTORY       Past Medical History:   Diagnosis Date    Hypertension        Labs:  Labs Reviewed   TROPONIN - Abnormal; Notable for the following components:       Result Value    Troponin, High Sensitivity 43 (*)     All other components within normal limits   CBC - Abnormal; Notable for the following components:    WBC 16.9 (*)     RBC 2.41 (*)     Hemoglobin 6.0 (*)     Hematocrit 19.6 (*)     MCV 81.3 (*)     MCH 24.9 (*)     RDW 19.2 (*)     NRBC Automated 0.5 (*)     All other components within normal limits   COMPREHENSIVE METABOLIC PANEL - Abnormal; Notable for the following components:    Potassium 3.3 (*)     Chloride 110 (*)     CO2 19 (*)     Glucose 170 (*)     BUN 43 (*)     Creatinine 1.9 (*)     Est, Glom Filt Rate 41 (*)     Total Protein 5.2 (*)     Alkaline Phosphatase 29 (*)     All other components within normal limits   LACTIC ACID - Abnormal; Notable for the following components:    Lactic Acid, Whole Blood 4.3 (*)     All other components within normal limits   LIPASE   TYPE AND SCREEN   PREPARE RBC (CROSSMATCH)       Electronically signed by Georgette Tim RN on 4/27/2024 at 10:55 PM

## 2024-04-28 NOTE — CARE COORDINATION
04/28/24 0739   Readmission Assessment   Number of Days since last admission? 8-30 days   Previous Disposition Other (comment)  (shelter)   Who is being Interviewed Patient   What was the patient's/caregiver's perception as to why they think they needed to return back to the hospital? Other (Comment)  (rectal bleeding)   Did you visit your Primary Care Physician after you left the hospital, before you returned this time? No   Why weren't you able to visit your PCP? Other (Comment)  (does not have one.  clinic list provided)   Did you see a specialist, such as Cardiac, Pulmonary, Orthopedic Physician, etc. after you left the hospital? No   Who advised the patient to return to the hospital? Self-referral   Does the patient report anything that got in the way of taking their medications? No   In our efforts to provide the best possible care to you and others like you, can you think of anything that we could have done to help you after you left the hospital the first time, so that you might not have needed to return so soon? Other (Comment);Identify patient's health literacy needs  (return for rectal bleed)

## 2024-04-28 NOTE — ED PROVIDER NOTES
Faculty Sign-Out Attestation  Handoff taken on the following patient from prior Attending Physician: Lidia  Note Started: 11:08 PM EDT    I was available and discussed any additional care issues that arose and coordinated the management plans with the resident(s) caring for the patient during my duty period. Any areas of disagreement with resident’s documentation of care or procedures are noted on the chart. I was personally present for the key portions of any/all procedures during my duty period. I have documented in the chart those procedures where I was not present during the key portions.    Hematemesis, hgb 6, rocephin / protonix,   Getting prbc,   Ct pending, >>> needing admit    Ct abd inflammation about pancreas, otherwise stable,   Admit    Evaristo Reynoso DO  Attending Physician       Evaristo Reynoso DO  04/27/24 6372       Evaristo Reynoso DO  04/28/24 7482

## 2024-04-28 NOTE — ED NOTES
Pt presents to the ED via EMS from [facility] with c/o SIOB and hematemesis.   Pt states that he has been having blood in his stool and vomit for the past few days.   Pt also reports a worsening shortness of breath over the past several days.   While walking from ambulance to ED, pt had an episode of vomiting with gross blood and clots seen.   Pt does appear to be mildly tachypneic. No distress noted. Pt denies any abd pain.   Pt changed into gown, placed on cardiac monitor, EKG done, IV established and labs drawn.   Call light within reach.

## 2024-04-28 NOTE — H&P
[facility]  Office: [phone]  Keny Nolan DO, Eliseo Croey DO, Jordan Love DO, Mina Pérez DO, Lela Pritchett MD, Muna Jaquez MD, Maisha Altamirano MD, Nicole Saunders MD,  Raheem Shaw MD, Sam Kern MD, David Causey DO, Marilee Bautista MD,  Jacob Dinero DO, Corina Jaime MD, Armani Lee MD, Camedn Nolan DO, Viky Osborne MD, Addison Chand MD, Carlos Macario DO, Eunice Cohn MD, Jyotsna Anne MD, Glenis Yip MD, Robert Lu MD,  Earl Chicas DO, Maynor Kwok MD,  Shirley Waterhouse, CNP,  Chelle Chan, CNP, Francheska Castro, CNP, Mikel Wyatt, CNP,  Liliana Mojica, [facility], Karli Calle, CNP, Caitie Donahue, CNP, Ebony Scott, CNP, Phyllis Meade, CNP, Dorothea Kevin, CNP, Miguelangel Quintana PA-C, Louise Terrazas, CNS, Jacqueline Owens, CNP, Lesvia Copeland, CNP         [facility]   IN-PATIENT SERVICE   [facility]    HISTORY AND PHYSICAL EXAMINATION            Date:   4/28/2024  Patient name:  Angela Ghotra  Date of admission:  4/27/2024  7:41 PM  MRN:   [id number]  Account:  [id number]  YOB: 1970  PCP:    No primary care provider on file.  Room:   [address] [place] [address]  Code Status:    Full Code    Chief Complaint:     Chief Complaint   Patient presents with    Hematemesis    Rectal Bleeding       History Obtained From:     patient    History of Present Illness:     Angela Ghotra is a 54 y.o. Non- / non  male who presents with Hematemesis and Rectal Bleeding   and is admitted to the hospital for the management of GI bleed.    54-year-old male with past medical history of alcohol abuse , smoking, hypertension, recent admission for GI bleed EGD showed Schatzki's ring, mild gastritis and gastric varices with portal hypertensive gastropathy presented to ED with concerns of vomiting blood dark maroon color and also seen blood in stools for a few days which is also dark in color.  Patient had 1 episode of bloody emesis in ED as  active GI bleed. 2.  Inflammatory change and fluid about the distal body and tail the pancreas with small amount of fluid abutting the stomach and likely associated fluid about the posterolateral spleen.  The nearby colon also appears mildly edematous, favored to be secondary to the pancreatic inflammatory change versus primary colitis/diverticulitis.  Recommend CT follow-up in 3 months to ensure resolution.     XR CHEST PORTABLE    Result Date: 4/27/2024  No acute process.       Assessment :      Hospital Problems             Last Modified POA    * (Principal) GI bleed 4/27/2024 Yes    Alcohol use 4/28/2024 Yes    Schatzki's ring 4/28/2024 Yes    Hematemesis 4/28/2024 Yes    Melena 4/28/2024 Yes    Gastritis 4/28/2024 Yes    Epigastric pain 4/28/2024 Yes    Blood loss anemia 4/28/2024 Yes    Elevated troponin 4/28/2024 Yes    Hypokalemia 4/28/2024 Yes    CKD (chronic kidney disease) 4/28/2024 Yes       Plan:     Patient status inpatient in the Progressive Unit/Step down    Acute blood loss anemia with hematemesis and melena: Upper GI bleed.  SIRS: Patient met SIRS criteria on admission with tachycardia, leukocytosis.     Protonix bolus and placed on Protonix drip  Recently done EGD which showed Schatzki's ring, gastritis and 1 gastric varices  Transfusing PRBC for the goal hemoglobin above 7  N.p.o.  H&H every 6 hours  Placing patient on octreotide drip because patient has portal hypertensive gastropathy and gastric varices on recent EGD    2.  Elevated troponins: Mildly elevated troponin, still uptrending likely in the setting of anemia/demand ischemia.  Cardiology seen the patient on recent admission echo was done no wall motion abnormality was seen and showed preserved EF.  Will continue to trend troponin.  Patient denies any chest pain right now EKG no acute ischemic changes.    3.  Tobacco use disorder/alcohol use disorder:  Encourage discontinuation  Social work consult    4 CKD: Patient baseline creatinine

## 2024-04-28 NOTE — CONSULTS
[facility]    GASTROENTEROLOGY CONSULT    Patient:   Angela Ghotra   :    1970   Facility:   [facility]   Date:    2024  Admission Dx:  GI bleed [K92.2]  Gastrointestinal hemorrhage, unspecified gastrointestinal hemorrhage type [K92.2]  Requesting physician: Corina Jaime MD  Reason for consult:  GI bleed  CC : \"Shortness of breath, vomiting blood. \"    SUBJECTIVE     HISTORY OF PRESENT ILLNESS  This is a 54 y.o. AA  male who was admitted 2024 with GI bleed [K92.2]  Gastrointestinal hemorrhage, unspecified gastrointestinal hemorrhage type [K92.2]. We have been asked to see the patient in consultation by Corina Jaime MD for  GI bleed      54-year-old male with a history of CKD, hypertension, alcohol use disorder, tobacco use disorder, gastric varices and portal hypertensive gastropathy who resented to the ED for evaluation of shortness of breath, vomiting blood and rectal bleeding.  Hemoglobin was 6.6 g/dL.  GI consulted for GI bleed.    Patient reports he had 1 episode of dark red/maroon vomiting on Friday.  Yesterday he became short of breath and presented for further evaluation.  While in the ED he had another episode of bright red/maroon emesis.       Patient was seen by GI earlier this month for melena where he had a hemoglobin of 4.9 g/dL.  EGD completed 2024 showed Schatzki's ring, no EV, 1 gastric varices with edema and swelling in the area of the fundus of the stomach with scaly skin appearance.  Edema consistent with portal hypertensive gastropathy.  Mild gastritis in the antrum and body of the stomach.    patient reports he has been having black stool since recent discharge with only 1 or 2 days of \"normal brown\" colored stool.  Yesterday he did note a maroon-colored stool.  He has been having maroon-colored stools overnight.    Currently patient is hemodynamically stable.  Denies any abdominal pain or epigastric pain.   arteries. POSTERIOR CIRCULATION: No significant stenosis of the vertebral, basilar, or posterior cerebral arteries.     1. No acute intracranial abnormality. 2. Unremarkable MRA of the head and neck.     CT HEAD WO CONTRAST    Addendum Date: 4/4/2024    ADDENDUM: Findings were discussed with Dr. Aleksey Lindo at 5:03 pm on 4/4/2024.     Result Date: 4/4/2024  EXAMINATION: CT OF THE HEAD WITHOUT CONTRAST  4/4/2024 4:40 pm TECHNIQUE: CT of the head was performed without the administration of intravenous contrast. Automated exposure control, iterative reconstruction, and/or weight based adjustment of the mA/kV was utilized to reduce the radiation dose to as low as reasonably achievable. COMPARISON: None. HISTORY: ORDERING SYSTEM PROVIDED HISTORY: vision loss, dilated nonreactive right pupil TECHNOLOGIST PROVIDED HISTORY: vision loss, dilated nonreactive right pupil Decision Support Exception - unselect if not a suspected or confirmed emergency medical condition->Emergency Medical Condition (MA) Reason for Exam: vision loss, dilated non-reactive right pupil. stroke alert/ FINDINGS: BRAIN/VENTRICLES: There is no acute intracranial hemorrhage, mass effect or midline shift. No abnormal extra-axial fluid collection.  Left basal ganglionic encephalomalacia changes are seen.  The gray-white differentiation is maintained without evidence of an acute infarct. There is prominence of the ventricles and sulci due to global parenchymal volume loss.  There are nonspecific areas of hypoattenuation within the periventricular and subcortical white matter, which likely represent chronic microvascular ischemic change. ORBITS: The visualized portion of the orbits demonstrate no acute abnormality. SINUSES: The visualized paranasal sinuses and mastoid air cells demonstrate no acute abnormality. SOFT TISSUES/SKULL: No acute abnormality of the visualized skull or soft tissues.     No acute intracranial abnormality.       IMPRESSION:     1.

## 2024-04-28 NOTE — H&P
Critical Care - History and Physical Examination    Patient's name:  Angela Ghotra  Medical Record Number: [id number]  Patient's account/billing number: [id number]  Patient's YOB: 1970  Age: 54 y.o.  Date of Admission: 4/27/2024  7:41 PM  Reason of ICU admission:   Date of History and Physical Examination: 4/28/2024      Primary Care Physician: No primary care provider on file.  Attending Physician:    Code Status: Full Code    Chief complaint:     Reason for ICU admission:     Gi bleed   History Of Present Illness:   History was obtained from chart review and the patient.      Angela Ghotra is a 54 y.o. PMH of Ckd, HTN, alcohol use disorder, tobacco use disorder, gastric varices, portal hypertension presented to the ED due to episodes of hematemesis and melena. While in the ED patient had additional episode of hematemesis.   Patient had hemoglobin of 6, has received 3u of prbc, last hemoglobin was 7.6. Had WBC of 16.9.   CTA of the abdomen and pelvis showed no active GI bleed.   Critical care was consulted while the patient was in the PACU bu GI.   Patient went for an EGD today. GI stated that the patient had a large amount of blood in the stomach which limited visualization.   Patient is on protonix and ocreotide drip.     Patient admitted to the ICU due to risk for GI hemorrhage.           Past Medical History:        Diagnosis Date    Alcohol use 04/08/2024    Blood loss anemia 04/28/2024    CKD (chronic kidney disease) 04/28/2024    Gastritis 04/28/2024    Hypertension        Past Surgical History:        Procedure Laterality Date    BACK SURGERY      ESOPHAGOGASTRODUODENOSCOPY  04/08/2024    ESOPHAGOGASTRODUODENOSCOPY  04/28/2024    ESOPHAGOGASTRODUODENOSCOPY    UPPER GASTROINTESTINAL ENDOSCOPY N/A 04/08/2024    ESOPHAGOGASTRODUODENOSCOPY BIOPSY performed by Jose Raul Cuevas MD at [facility] OR       Allergies:    Allergies   Allergen Reactions    Pork-Derived Products Other (See Comments)         Home  Positive (Details:  )  Urine Culture:                   [] None drawn      [] Negative             []  Positive (Details:  )  Sputum Culture:               [] None drawn       [] Negative             []  Positive (Details:  )   Endotracheal aspirate:     [] None drawn       [] Negative             []  Positive (Details:  )         -----------------------------------------------------------------  Radiological reports:    CXR:    Electrocardiogram:     Last Echocardiogram findings:          Assessment and Plan     Patient Active Problem List   Diagnosis    Chest pain    GI bleed    NSTEMI (non-ST elevated myocardial infarction) (HCC)    Alcohol use    Schatzki's ring    Hematemesis    Melena    Gastritis    Epigastric pain    Blood loss anemia    Elevated troponin    Hypokalemia    CKD (chronic kidney disease)         Additional assessment:      Plan:  Neuro:  AOX4   Neuro checks per protocol   Hx of alcoholism   CIWA protocol   Monitor for withdrawal   Folic acid and thiamine     Resp:  Good O2 saturation on RA  CXR: wnl     CV:  Normotensive, not requiring vasopressors   Elevated troponin, continue to trend   EKG was non ischemic     GI/Nutrition:  Acute blood loss anemia, hematemesis, melena   GI consulted, performed EGD today.   GI was unable to visualize bleeding, there was a large amount of blood and blood clots in the stomach   Hgb of 6 yesterday, has received a total of 3u prbc   On protonix and ocreotide     /Fluids/Electrolytes:  MIVF: NS @ 75ml/hr   Monitor I/Os   Daily BMPs   Replace electrolytes prn    Heme:  Hgb of 6 on admission   Received 3u prbc   Most recent hgb was 7.6     ID:  WBC of 16   Afebrile   Monitor for infection   Was given one dose of rocephin today          Prophylaxis:  DVT: None as patient has GI bleed   GI: protonix     Dispo:  Admit to ICU         Sascha Kruse MD   Internal Medicine - PGY-3  Intensive Care Unit  4/28/2024, 5:52 PM   Attending Physician Statement  I have  discussed the care of Angela Ghotra, including pertinent history and exam findings,  with the resident. I have seen and examined the patient and the key elements of all parts of the encounter have been performed by me.  I agree with the assessment, plan and orders as documented by the resident with additions .      Total critical care time caring for this patient with life threatening, unstable organ failure, including direct patient contact, management of life support systems, review of data including imaging and labs, discussions with other team members and physicians at least 35   Min so far today, excluding procedures.   Electronically signed by JARRET EARL MD on   4/28/24 at 7:53 PM EDT    Please note that this chart was generated using voice recognition Dragon dictation software.  Although every effort was made to ensure the accuracy of this automated transcription, some errors in transcription may have occurred.

## 2024-04-28 NOTE — PLAN OF CARE
Problem: Discharge Planning  Goal: Discharge to home or other facility with appropriate resources  Outcome: Progressing  Flowsheets (Taken 4/28/2024 0800)  Discharge to home or other facility with appropriate resources: Identify barriers to discharge with patient and caregiver     Problem: Safety - Adult  Goal: Free from fall injury  Outcome: Progressing     Problem: ABCDS Injury Assessment  Goal: Absence of physical injury  Outcome: Progressing

## 2024-04-28 NOTE — ED NOTES
The following labs were labeled with appropriate pt sticker and tubed to lab:     [] Blue     [] Lavender   [] on ice  [] Green/yellow  [] Green/black [] on ice  [] Grey  [] on ice  [] Yellow  [] Red  [x] Pink  [x] Type/ Screen  [] ABG  [] VBG    [] COVID-19 swab    [] Rapid  [] PCR  [] Flu swab  [] Peds Viral Panel     [] Urine Sample  [] Fecal Sample  [] Pelvic Cultures  [] Blood Cultures  [] X 2  [] STREP Cultures  [] Wound Cultures

## 2024-04-28 NOTE — PROGRESS NOTES
Attempted to call house supervisor x4 to get transport for pt - no one is answering   floor [phone] states not able to  pt

## 2024-04-28 NOTE — OP NOTE
PROCEDURE NOTE    DATE OF PROCEDURE: 4/28/2024     SURGEON: Troy Rubio MD  Facility: [facility]  ASSISTANT: None  Anesthesia: Monitored anesthesia care  PREOPERATIVE DIAGNOSIS: Upper GI bleed    Diagnosis:    POSTOPERATIVE DIAGNOSIS: As described below    OPERATION: Upper GI endoscopy with Biopsy    ANESTHESIA: Moderate Sedation     ESTIMATED BLOOD LOSS: Less than 50 ml    COMPLICATIONS: None.     SPECIMENS:  Was Not Obtained    HISTORY: The patient is a 54 y.o. year old male with history of above preop diagnosis.  I recommended esophagogastroduodenoscopy with possible biopsy and I explained the risk, benefits, expected outcome, and alternatives to the procedure.  Risks included but are not limited to bleeding, infection, respiratory distress, hypotension, and perforation of the esophagus, stomach, or duodenum.  Patient understands and is in agreement.      PROCEDURE: The patient was given IV conscious sedation.  The patient's SPO2 remained above 90% throughout the procedure.The gastroscope was inserted orally and advanced under direct vision through the esophagus, through the stomach, through the pylorus, and into the descending duodenum.      Post sedation note :The patient's SPO2 remained above 90% throughout the procedure.the vital signs remained stable , and no immediate complication form the procedure noted, patient will be ready for d/c when criteria is met .      Findings:    Retropharyngeal area was grossly normal appearing    Esophagus: normal;     Esophagogastric markings: Diaphragmatic hiatus- 36 cm; GE junction- 36 cm; Squamo-columnar junction- 36 cm    Stomach:    Fundus: abnormal: small gastric varices    Body: limited exam due to blood clots along greater curvature    Antrum: abnormal: portal hypertensive gastropathy    Duodenum:     Descending: normal    Bulb: normal    The scope was removed and the patient tolerated the procedure well.     After withdrawal of the gastroscope, patient lost his

## 2024-04-28 NOTE — PROGRESS NOTES
Report called from 4B to ICU RN Carl who denies further questions. Belongings (backpack, sandals, clothing, 2 phones) and chart sent via unit clerk to [phone].

## 2024-04-28 NOTE — PROGRESS NOTES
[facility]  Office: [phone]  Keny Nolan DO, Eliseo Corey DO, Jordan Love DO, Mina Pérez DO, Lela Pritchett MD, Muna Jaquez MD, Maisha Altamirano MD, Nicole Saunders MD,  Raheem Shaw MD, Sam Kern MD, Marilee Bautista MD,  Jacob Dinero DO, Corina Jaime MD, Armani Lee MD, Camden Nolan DO, Viky Osborne MD,  Carlos Macario DO, Eunice Cohn MD, Jyotsna Anne MD, Glenis Yip MD, Robert Lu MD,  Roshan Baker MD, Britta Stock MD, Nikos Ordaz MD, Jeffery Cox MD, Nathanael Cardoso MD, Joanie Newsome MD, Earl Chicas DO, David Causey DO, Maynor Kwok MD,  Addison Chand MD, Shirley Waterhouse, CNP,  Chelle Chan CNP, Mikel Wyatt, CNP,  Liliana Mojica, DNP, Karli Calle, CNP, Caitie Donahue, CNP, Phyllis Meade, CNP, Dorothea Kevin, CNP, Lela Huitron, PA-C, Amy Jones PA-C, Latoya Gasca, CNP, Eri Fenton, CNP, Ofelia Whitley, CNP, Francheska Castro, CNP, Ebony Scott, CNP, Louise Terrazas, CNS, Jacqueline Owens, CNP, Lesvia Copeland CNP, Tracy Schwab, CNP         [facility]   IN-PATIENT SERVICE   [facility]    Second Visit Note  For more detailed information please refer to the progress note of the day      4/28/2024    1:15 PM    Name:   Angela Ghotra  MRN:     [id number]     Acct:      [id number]   Room:   [facility] Day:  1  Admit Date:  4/27/2024  7:41 PM    PCP:   No primary care provider on file.  Code Status:  Full Code    54-year-old male with past medical history of alcohol abuse , smoking, hypertension, recent admission for GI bleed EGD showed Schatzki's ring, mild gastritis and gastric varices with portal hypertensive gastropathy presented to ED with concerns of vomiting blood dark maroon color and also seen blood in stools for a few days which is also dark in color.  Patient started having bloody bowel movements.  Patient was taken for EGD today and was noted to have concern for small  gastric varices, limited exam due to blood clots.  The greater curvature, concern for portal hypertensive gastropathy.  Discussed with GI, plan to transfer to ICU. Per egd note, patient lost his pulses was successfully resuscitated by the anesthesia team.    Pt vitals were reviewed   New labs were reviewed       Updated plan :     Will transfer to icu  Continue protonix and octreotide gtt  If patient re-bleeds, consider TIPSS   Continue to monitor hb  Watch creatinine    Corina Jaime MD  4/28/2024  1:15 PM

## 2024-04-28 NOTE — CONSENT
Informed Consent for Blood Component Transfusion Note    I have discussed with the patient the rationale for blood component transfusion; its benefits in treating or preventing fatigue, organ damage, or death; and its risk which includes mild transfusion reactions, rare risk of blood borne infection, or more serious but rare reactions. I have discussed the alternatives to transfusion, including the risk and consequences of not receiving transfusion. The patient had an opportunity to ask questions and had agreed to proceed with transfusion of blood components.    Electronically signed by Eduardo Lin DO on 4/27/24 at 8:26 PM EDT

## 2024-04-28 NOTE — ED NOTES
Pt transported to floor via stretcher by Madeleine Glynn   Ticket to ride printed and signed.  Floor ntfd of pt's departure by ED coordinator.

## 2024-04-29 ENCOUNTER — APPOINTMENT (OUTPATIENT)
Dept: ULTRASOUND IMAGING | Age: 54
DRG: 377 | End: 2024-04-29
Payer: COMMERCIAL

## 2024-04-29 LAB
ANION GAP SERPL CALCULATED.3IONS-SCNC: 7 MMOL/L (ref 9–16)
BASOPHILS # BLD: 0.05 K/UL (ref 0–0.2)
BASOPHILS NFR BLD: 1 % (ref 0–2)
BUN SERPL-MCNC: 21 MG/DL (ref 6–20)
CALCIUM SERPL-MCNC: 7.5 MG/DL (ref 8.6–10.4)
CHLORIDE SERPL-SCNC: 117 MMOL/L (ref 98–107)
CO2 SERPL-SCNC: 18 MMOL/L (ref 20–31)
CREAT SERPL-MCNC: 1.7 MG/DL (ref 0.7–1.2)
EKG ATRIAL RATE: 99 BPM
EKG P AXIS: 45 DEGREES
EKG P-R INTERVAL: 114 MS
EKG Q-T INTERVAL: 356 MS
EKG QRS DURATION: 66 MS
EKG QTC CALCULATION (BAZETT): 456 MS
EKG R AXIS: 62 DEGREES
EKG T AXIS: 73 DEGREES
EKG VENTRICULAR RATE: 99 BPM
EOSINOPHIL # BLD: 0.37 K/UL (ref 0–0.44)
EOSINOPHILS RELATIVE PERCENT: 4 % (ref 1–4)
ERYTHROCYTE [DISTWIDTH] IN BLOOD BY AUTOMATED COUNT: 18.3 % (ref 11.8–14.4)
GFR SERPL CREATININE-BSD FRML MDRD: 47 ML/MIN/1.73M2
GLUCOSE SERPL-MCNC: 122 MG/DL (ref 74–99)
HCT VFR BLD AUTO: 23.7 % (ref 40.7–50.3)
HCT VFR BLD AUTO: 25.8 % (ref 40.7–50.3)
HCT VFR BLD AUTO: 26.8 % (ref 40.7–50.3)
HCT VFR BLD AUTO: 27.1 % (ref 40.7–50.3)
HGB BLD-MCNC: 7.8 G/DL (ref 13–17)
HGB BLD-MCNC: 8.2 G/DL (ref 13–17)
HGB BLD-MCNC: 8.4 G/DL (ref 13–17)
HGB BLD-MCNC: 8.7 G/DL (ref 13–17)
IMM GRANULOCYTES # BLD AUTO: 0.04 K/UL (ref 0–0.3)
IMM GRANULOCYTES NFR BLD: 0 %
LYMPHOCYTES NFR BLD: 1.56 K/UL (ref 1.1–3.7)
LYMPHOCYTES RELATIVE PERCENT: 15 % (ref 24–43)
MCH RBC QN AUTO: 29.2 PG (ref 25.2–33.5)
MCHC RBC AUTO-ENTMCNC: 32.5 G/DL (ref 28.4–34.8)
MCV RBC AUTO: 89.9 FL (ref 82.6–102.9)
MONOCYTES NFR BLD: 0.81 K/UL (ref 0.1–1.2)
MONOCYTES NFR BLD: 8 % (ref 3–12)
MRSA, DNA, NASAL: NEGATIVE
NEUTROPHILS NFR BLD: 73 % (ref 36–65)
NEUTS SEG NFR BLD: 7.47 K/UL (ref 1.5–8.1)
NRBC BLD-RTO: 0.7 PER 100 WBC
PLATELET # BLD AUTO: ABNORMAL K/UL (ref 138–453)
PLATELET, FLUORESCENCE: 110 K/UL (ref 138–453)
PLATELETS.RETICULATED NFR BLD AUTO: 3.1 % (ref 1.1–10.3)
POTASSIUM SERPL-SCNC: 4.2 MMOL/L (ref 3.7–5.3)
RBC # BLD AUTO: 2.98 M/UL (ref 4.21–5.77)
RBC # BLD: ABNORMAL 10*6/UL
SODIUM SERPL-SCNC: 142 MMOL/L (ref 136–145)
SPECIMEN DESCRIPTION: NORMAL
WBC OTHER # BLD: 10.3 K/UL (ref 3.5–11.3)

## 2024-04-29 PROCEDURE — 2580000003 HC RX 258: Performed by: INTERNAL MEDICINE

## 2024-04-29 PROCEDURE — 6370000000 HC RX 637 (ALT 250 FOR IP): Performed by: INTERNAL MEDICINE

## 2024-04-29 PROCEDURE — 85055 RETICULATED PLATELET ASSAY: CPT

## 2024-04-29 PROCEDURE — 6360000002 HC RX W HCPCS: Performed by: INTERNAL MEDICINE

## 2024-04-29 PROCEDURE — 2000000000 HC ICU R&B

## 2024-04-29 PROCEDURE — 99291 CRITICAL CARE FIRST HOUR: CPT | Performed by: INTERNAL MEDICINE

## 2024-04-29 PROCEDURE — APPSS45 APP SPLIT SHARED TIME 31-45 MINUTES: Performed by: INTERNAL MEDICINE

## 2024-04-29 PROCEDURE — C9113 INJ PANTOPRAZOLE SODIUM, VIA: HCPCS | Performed by: INTERNAL MEDICINE

## 2024-04-29 PROCEDURE — 85018 HEMOGLOBIN: CPT

## 2024-04-29 PROCEDURE — 2580000003 HC RX 258: Performed by: ANESTHESIOLOGY

## 2024-04-29 PROCEDURE — 36430 TRANSFUSION BLD/BLD COMPNT: CPT

## 2024-04-29 PROCEDURE — 80048 BASIC METABOLIC PNL TOTAL CA: CPT

## 2024-04-29 PROCEDURE — 99232 SBSQ HOSP IP/OBS MODERATE 35: CPT | Performed by: INTERNAL MEDICINE

## 2024-04-29 PROCEDURE — 2580000003 HC RX 258: Performed by: STUDENT IN AN ORGANIZED HEALTH CARE EDUCATION/TRAINING PROGRAM

## 2024-04-29 PROCEDURE — 85025 COMPLETE CBC W/AUTO DIFF WBC: CPT

## 2024-04-29 PROCEDURE — 36415 COLL VENOUS BLD VENIPUNCTURE: CPT

## 2024-04-29 PROCEDURE — 76705 ECHO EXAM OF ABDOMEN: CPT

## 2024-04-29 PROCEDURE — 85014 HEMATOCRIT: CPT

## 2024-04-29 PROCEDURE — P9016 RBC LEUKOCYTES REDUCED: HCPCS

## 2024-04-29 RX ORDER — SODIUM CHLORIDE 9 MG/ML
INJECTION, SOLUTION INTRAVENOUS PRN
Status: DISCONTINUED | OUTPATIENT
Start: 2024-04-28 | End: 2024-04-29

## 2024-04-29 RX ADMIN — FOLIC ACID 1 MG: 1 TABLET ORAL at 07:38

## 2024-04-29 RX ADMIN — PANTOPRAZOLE SODIUM 8 MG/HR: 40 INJECTION, POWDER, FOR SOLUTION INTRAVENOUS at 01:57

## 2024-04-29 RX ADMIN — PANTOPRAZOLE SODIUM 8 MG/HR: 40 INJECTION, POWDER, FOR SOLUTION INTRAVENOUS at 11:13

## 2024-04-29 RX ADMIN — PANTOPRAZOLE SODIUM 8 MG/HR: 40 INJECTION, POWDER, FOR SOLUTION INTRAVENOUS at 22:39

## 2024-04-29 RX ADMIN — SUCRALFATE 1 G: 1 TABLET ORAL at 07:39

## 2024-04-29 RX ADMIN — SUCRALFATE 1 G: 1 TABLET ORAL at 12:03

## 2024-04-29 RX ADMIN — OCTREOTIDE ACETATE 50 MCG/HR: 500 INJECTION, SOLUTION INTRAVENOUS; SUBCUTANEOUS at 22:38

## 2024-04-29 RX ADMIN — SODIUM CHLORIDE: 9 INJECTION, SOLUTION INTRAVENOUS at 01:58

## 2024-04-29 RX ADMIN — ATORVASTATIN CALCIUM 40 MG: 40 TABLET, FILM COATED ORAL at 20:21

## 2024-04-29 RX ADMIN — SODIUM CHLORIDE: 9 INJECTION, SOLUTION INTRAVENOUS at 10:13

## 2024-04-29 RX ADMIN — SODIUM CHLORIDE, PRESERVATIVE FREE 10 ML: 5 INJECTION INTRAVENOUS at 20:21

## 2024-04-29 RX ADMIN — OCTREOTIDE ACETATE 50 MCG/HR: 500 INJECTION, SOLUTION INTRAVENOUS; SUBCUTANEOUS at 14:02

## 2024-04-29 RX ADMIN — METOPROLOL SUCCINATE 25 MG: 25 TABLET, EXTENDED RELEASE ORAL at 07:38

## 2024-04-29 RX ADMIN — SODIUM CHLORIDE, PRESERVATIVE FREE 10 ML: 5 INJECTION INTRAVENOUS at 07:39

## 2024-04-29 RX ADMIN — Medication 100 MG: at 07:38

## 2024-04-29 RX ADMIN — OCTREOTIDE ACETATE 50 MCG/HR: 500 INJECTION, SOLUTION INTRAVENOUS; SUBCUTANEOUS at 03:54

## 2024-04-29 RX ADMIN — SUCRALFATE 1 G: 1 TABLET ORAL at 20:21

## 2024-04-29 RX ADMIN — SUCRALFATE 1 G: 1 TABLET ORAL at 16:35

## 2024-04-29 RX ADMIN — Medication 1 TABLET: at 07:38

## 2024-04-29 ASSESSMENT — PAIN SCALES - GENERAL
PAINLEVEL_OUTOF10: 0
PAINLEVEL_OUTOF10: 0

## 2024-04-29 NOTE — PLAN OF CARE
Problem: Discharge Planning  Goal: Discharge to home or other facility with appropriate resources  4/28/2024 2058 by Lety Guerrero RN  Outcome: Progressing  4/28/2024 0854 by Suzanne Owens RN  Outcome: Progressing  Flowsheets (Taken 4/28/2024 0800)  Discharge to home or other facility with appropriate resources: Identify barriers to discharge with patient and caregiver     Problem: Safety - Adult  Goal: Free from fall injury  4/28/2024 2058 by Lety Guerrero RN  Outcome: Progressing  Flowsheets (Taken 4/28/2024 2057)  Free From Fall Injury: Instruct family/caregiver on patient safety  4/28/2024 0854 by Suzanne Owens RN  Outcome: Progressing     Problem: ABCDS Injury Assessment  Goal: Absence of physical injury  4/28/2024 2058 by Lety Guerrero RN  Outcome: Progressing  4/28/2024 0854 by Suzanne Owens RN  Outcome: Progressing     Problem: Pain  Goal: Verbalizes/displays adequate comfort level or baseline comfort level  Outcome: Progressing

## 2024-04-29 NOTE — PLAN OF CARE
Problem: Discharge Planning  Goal: Discharge to home or other facility with appropriate resources  4/29/2024 0927 by Arpit Montoya, RN  Outcome: Progressing     Problem: Safety - Adult  Goal: Free from fall injury  4/29/2024 0927 by Arpit Montoya, RN  Outcome: Progressing     Problem: ABCDS Injury Assessment  Goal: Absence of physical injury  4/29/2024 0927 by Arpit Montoya, RN  Outcome: Progressing     Problem: Pain  Goal: Verbalizes/displays adequate comfort level or baseline comfort level  4/29/2024 0927 by Arpit Montoya, RN  Outcome: Progressing

## 2024-04-29 NOTE — PROGRESS NOTES
[facility] Progress Note    Angela Ghotra is a 54 y.o. male patient.  Hospitalization Day:2      Chief consult reason: GI bleed      Subjective: Patient seen and examined.  Patient is hemodynamically stable sitting comfortably in bed. Overall feels well.  No nausea or vomiting. Patient reports having 2 black stools overnight and 1 around 9 AM.  Patient reports stool more maroonish color on toilet paper though black in color in commode.  Patient had a drop in hemoglobin last evening to 5.6 g/dL.  He received 2 units of PRBC with current hemoglobin 8.7 g/dL      Objective:   VITALS:  BP (!) 141/82   Pulse 68   Temp 98.2 °F (36.8 °C) (Oral)   Resp 19   Ht 1.702 m (5' 7.01\")   Wt 61.5 kg (135 lb 9.3 oz)   SpO2 98%   BMI 21.23 kg/m²   TEMPERATURE:  Current - Temp: 98.2 °F (36.8 °C); Max - Temp  Av.2 °F (36.8 °C)  Min: 97.6 °F (36.4 °C)  Max: 98.4 °F (36.9 °C)    Physical Assessment:  General appearance:  alert, cooperative and no distress  Mental Status:  oriented to person, place and time and normal affect  Lungs:  clear to auscultation bilaterally, normal effort  Heart:  regular rate and rhythm, no murmur  Abdomen:  soft, nontender, nondistended, normal bowel sounds  Extremities:  no edema, no redness, No clubbing  Skin:  warm, dry, no gross lesions or rashes    CURRENT MEDICATIONS:  Scheduled Meds:   atorvastatin  40 mg Oral Nightly    folic acid  1 mg Oral Daily    metoprolol succinate  25 mg Oral Daily    therapeutic multivitamin-minerals  1 tablet Oral Daily    sucralfate  1 g Oral 4x Daily    thiamine  100 mg Oral Daily    sodium chloride flush  5-40 mL IntraVENous 2 times per day    sodium chloride flush  5-40 mL IntraVENous 2 times per day     Continuous Infusions:   sodium chloride 100 mL/hr at 24 1138    sodium chloride      octreotide (SANDOSTATIN) 500 mcg in sodium chloride 0.9 % 100 mL infusion 50 mcg/hr (24 1402)    sodium chloride      pantoprazole 8 mg/hr  (04/29/24 1138)     PRN Meds:sodium chloride flush, sodium chloride, ondansetron **OR** ondansetron, acetaminophen **OR** acetaminophen, naloxone 0.4 mg in 10 mL sodium chloride syringe, sodium chloride flush, sodium chloride      Data Review:  LABS and IMAGING:     CBC  Recent Labs     04/27/24 1952 04/27/24 2346 04/28/24  1648 04/28/24  2109 04/28/24  2344 04/29/24  0436 04/29/24  1017   WBC 16.9*  --  14.4*  --   --   --  10.3   HGB 6.0*   < > 7.1* 7.7* 5.6* 8.4* 8.7*   HCT 19.6*   < > 23.6* 24.9* 17.6* 25.8* 26.8*   MCV 81.3*  --  88.1  --   --   --  89.9   MCHC 30.6  --  30.1  --   --   --  32.5   RDW 19.2*  --  18.0*  --   --   --  18.3*     --  117*  --   --   --  See Reflexed IPF Result    < > = values in this interval not displayed.       Immature PLTs   Lab Results   Component Value Date/Time    PLTFLUORE 110 04/29/2024 10:17 AM       PT/INR  Recent Labs     04/28/24  0525   PROTIME 15.7*   INR 1.3       ANEMIA STUDIES  Recent Labs     04/28/24  0525   IRONPERSAT 85*   TIBC 217*   IRON 184*       BMP  Recent Labs     04/27/24 1952 04/28/24  0525 04/29/24 0436    140 142   K 3.3* 4.1 4.2   * 114* 117*   CO2 19* 18* 18*   BUN 43* 36* 21*   CREATININE 1.9* 1.6* 1.7*   GLUCOSE 170* 120* 122*   CALCIUM 9.0 7.7* 7.5*       LFTS  Recent Labs     04/27/24 1952   ALKPHOS 29*   ALT 11   AST 20   BILITOT 0.3       AMYLASE/LIPASE/AMMONIA  Recent Labs     04/27/24 1952   LIPASE 19       Acute Hepatitis Panel   No results found for: \"HEPBSAG\", \"HEPCAB\", \"HEPBIGM\", \"HEPAIGM\"    HCV Genotype   No components found for: \"HEPATITISCGENOTYPE\"    HCV Quantitative   No results found for: \"HCVQNT\"    LIVER WORK UP:    AFP  No results found for: \"AFP\"    Alpha 1 antitrypsin   No results found for: \"A1A\"    Anti - Liver/Kidney Ab  No results found for: \"LIVER-KIDNEYMICROSOMALAB\"    CONTRERAS  No results found for: \"CONTRERAS\"    AMA  No results found for: \"MITOAB\"    ASMA  No results found for:  portal hypertensive gastropathy, no active bleeding     Recommendations/Plan:   Continue on Octreotide gtt  If patient re-bleeds, consider TIPSS  F/U In Office in 3-4 weeks  Discussed with the family     Electronically signed by Troy Rubio MD  on 4/28/2024 at 12:26 PM        Principal Problem:    GI bleed  Active Problems:    Alcohol use    Schatzki's ring    Hematemesis    Melena    Gastritis    Epigastric pain    Blood loss anemia    Elevated troponin    Hypokalemia    CKD (chronic kidney disease)  Resolved Problems:    * No resolved hospital problems. *       GI Assessment:    1.  Acute upper GI bleed with hematemesis and bright red blood per rectum  - EGD 04/27/2024 Gastric varices and portal hypertensive gastropathy.  Exam limited due to blood clots along greater curvature     2.  Acute blood loss anemia secondary to GI bleed -patient received 3 units PRBC before procedure and 2 overnight.  Current hemoglobin 8.7 g/dL     3. Chronic alcohol use x 40 years    4. HX of NSTEMI - (previous admission) suspected type II in stetting of acute anemia. Echo - preserved       Plan of care:  Continue octreotide and Protonix  Agree with liver ultrasound to assess for underlying cirrhosis.  No imaging to account for patient's varices  Monitor H&H.  Transfuse to keep~7g/dL.  Do not over transfuse as this will increase portal pressures which can lead to further bleeding  Continue n.p.o. for now, till seen on second rounds-discussed with patient.      Time spent reviewing chart, seeing patient, documentation and coordination of care for the above conditions, and discussing with attending: Around 30 minutes    This plan was formulated in collaboration with Dr. Rubio  Please feel free to contact me with any questions or concerns.   Thank you for allowing me to participate in the care of your patient.      Daniella Leary, FADY - CNP on 4/29/2024 at 4:28 PM   [facility]    Please note that this note was  generated using a voice recognition dictation software.  Although every effort was made to ensure the accuracy of this automated transcription, some errors in transcription may have occurred.

## 2024-04-30 ENCOUNTER — ANESTHESIA EVENT (OUTPATIENT)
Dept: OPERATING ROOM | Age: 54
End: 2024-04-30
Payer: COMMERCIAL

## 2024-04-30 ENCOUNTER — APPOINTMENT (OUTPATIENT)
Dept: ULTRASOUND IMAGING | Age: 54
DRG: 377 | End: 2024-04-30
Payer: COMMERCIAL

## 2024-04-30 ENCOUNTER — ANESTHESIA (OUTPATIENT)
Dept: OPERATING ROOM | Age: 54
End: 2024-04-30
Payer: COMMERCIAL

## 2024-04-30 LAB
ALBUMIN SERPL-MCNC: 2.8 G/DL (ref 3.5–5.2)
ALBUMIN/GLOB SERPL: 2 {RATIO} (ref 1–2.5)
ALP SERPL-CCNC: 26 U/L (ref 40–129)
ALT SERPL-CCNC: 16 U/L (ref 10–50)
ANION GAP SERPL CALCULATED.3IONS-SCNC: 10 MMOL/L (ref 9–16)
AST SERPL-CCNC: 26 U/L (ref 10–50)
BASOPHILS # BLD: 0.05 K/UL (ref 0–0.2)
BASOPHILS NFR BLD: 1 % (ref 0–2)
BILIRUB SERPL-MCNC: 0.5 MG/DL (ref 0–1.2)
BUN SERPL-MCNC: 16 MG/DL (ref 6–20)
CALCIUM SERPL-MCNC: 7.7 MG/DL (ref 8.6–10.4)
CHLORIDE SERPL-SCNC: 111 MMOL/L (ref 98–107)
CO2 SERPL-SCNC: 19 MMOL/L (ref 20–31)
CREAT SERPL-MCNC: 1.7 MG/DL (ref 0.7–1.2)
EOSINOPHIL # BLD: 0.35 K/UL (ref 0–0.44)
EOSINOPHILS RELATIVE PERCENT: 4 % (ref 1–4)
ERYTHROCYTE [DISTWIDTH] IN BLOOD BY AUTOMATED COUNT: 18.1 % (ref 11.8–14.4)
GFR SERPL CREATININE-BSD FRML MDRD: 46 ML/MIN/1.73M2
GLUCOSE SERPL-MCNC: 90 MG/DL (ref 74–99)
HCT VFR BLD AUTO: 20.3 % (ref 40.7–50.3)
HCT VFR BLD AUTO: 20.9 % (ref 40.7–50.3)
HCT VFR BLD AUTO: 24.4 % (ref 40.7–50.3)
HCT VFR BLD AUTO: 25.7 % (ref 40.7–50.3)
HCT VFR BLD AUTO: 26.1 % (ref 40.7–50.3)
HGB BLD-MCNC: 6.6 G/DL (ref 13–17)
HGB BLD-MCNC: 6.7 G/DL (ref 13–17)
HGB BLD-MCNC: 7.8 G/DL (ref 13–17)
HGB BLD-MCNC: 8.3 G/DL (ref 13–17)
HGB BLD-MCNC: 8.4 G/DL (ref 13–17)
IMM GRANULOCYTES # BLD AUTO: 0.04 K/UL (ref 0–0.3)
IMM GRANULOCYTES NFR BLD: 0 %
LYMPHOCYTES NFR BLD: 1.54 K/UL (ref 1.1–3.7)
LYMPHOCYTES RELATIVE PERCENT: 17 % (ref 24–43)
MCH RBC QN AUTO: 29.5 PG (ref 25.2–33.5)
MCHC RBC AUTO-ENTMCNC: 32.7 G/DL (ref 28.4–34.8)
MCV RBC AUTO: 90.2 FL (ref 82.6–102.9)
MONOCYTES NFR BLD: 0.69 K/UL (ref 0.1–1.2)
MONOCYTES NFR BLD: 8 % (ref 3–12)
NEUTROPHILS NFR BLD: 71 % (ref 36–65)
NEUTS SEG NFR BLD: 6.58 K/UL (ref 1.5–8.1)
NRBC BLD-RTO: 1.1 PER 100 WBC
PLATELET # BLD AUTO: ABNORMAL K/UL (ref 138–453)
PLATELET, FLUORESCENCE: 100 K/UL (ref 138–453)
PLATELETS.RETICULATED NFR BLD AUTO: 4.6 % (ref 1.1–10.3)
POTASSIUM SERPL-SCNC: 3.8 MMOL/L (ref 3.7–5.3)
PROT SERPL-MCNC: 4 G/DL (ref 6.6–8.7)
RBC # BLD AUTO: 2.85 M/UL (ref 4.21–5.77)
RBC # BLD: ABNORMAL 10*6/UL
SODIUM SERPL-SCNC: 140 MMOL/L (ref 136–145)
WBC OTHER # BLD: 9.3 K/UL (ref 3.5–11.3)

## 2024-04-30 PROCEDURE — 2580000003 HC RX 258: Performed by: INTERNAL MEDICINE

## 2024-04-30 PROCEDURE — 76705 ECHO EXAM OF ABDOMEN: CPT

## 2024-04-30 PROCEDURE — 36415 COLL VENOUS BLD VENIPUNCTURE: CPT

## 2024-04-30 PROCEDURE — 36430 TRANSFUSION BLD/BLD COMPNT: CPT

## 2024-04-30 PROCEDURE — 2000000000 HC ICU R&B

## 2024-04-30 PROCEDURE — 6370000000 HC RX 637 (ALT 250 FOR IP): Performed by: INTERNAL MEDICINE

## 2024-04-30 PROCEDURE — 86850 RBC ANTIBODY SCREEN: CPT

## 2024-04-30 PROCEDURE — 86901 BLOOD TYPING SEROLOGIC RH(D): CPT

## 2024-04-30 PROCEDURE — 6360000002 HC RX W HCPCS: Performed by: NURSE ANESTHETIST, CERTIFIED REGISTERED

## 2024-04-30 PROCEDURE — 6360000002 HC RX W HCPCS: Performed by: INTERNAL MEDICINE

## 2024-04-30 PROCEDURE — 0DJ08ZZ INSPECTION OF UPPER INTESTINAL TRACT, VIA NATURAL OR ARTIFICIAL OPENING ENDOSCOPIC: ICD-10-PCS | Performed by: INTERNAL MEDICINE

## 2024-04-30 PROCEDURE — 86900 BLOOD TYPING SEROLOGIC ABO: CPT

## 2024-04-30 PROCEDURE — 7100000001 HC PACU RECOVERY - ADDTL 15 MIN: Performed by: INTERNAL MEDICINE

## 2024-04-30 PROCEDURE — 85014 HEMATOCRIT: CPT

## 2024-04-30 PROCEDURE — 85055 RETICULATED PLATELET ASSAY: CPT

## 2024-04-30 PROCEDURE — 99291 CRITICAL CARE FIRST HOUR: CPT | Performed by: INTERNAL MEDICINE

## 2024-04-30 PROCEDURE — 3700000000 HC ANESTHESIA ATTENDED CARE: Performed by: INTERNAL MEDICINE

## 2024-04-30 PROCEDURE — 86920 COMPATIBILITY TEST SPIN: CPT

## 2024-04-30 PROCEDURE — 2580000003 HC RX 258: Performed by: NURSE ANESTHETIST, CERTIFIED REGISTERED

## 2024-04-30 PROCEDURE — P9016 RBC LEUKOCYTES REDUCED: HCPCS

## 2024-04-30 PROCEDURE — 2580000003 HC RX 258

## 2024-04-30 PROCEDURE — 3609017100 HC EGD: Performed by: INTERNAL MEDICINE

## 2024-04-30 PROCEDURE — 80053 COMPREHEN METABOLIC PANEL: CPT

## 2024-04-30 PROCEDURE — C9113 INJ PANTOPRAZOLE SODIUM, VIA: HCPCS | Performed by: INTERNAL MEDICINE

## 2024-04-30 PROCEDURE — 85018 HEMOGLOBIN: CPT

## 2024-04-30 PROCEDURE — 85025 COMPLETE CBC W/AUTO DIFF WBC: CPT

## 2024-04-30 PROCEDURE — 2500000003 HC RX 250 WO HCPCS: Performed by: NURSE ANESTHETIST, CERTIFIED REGISTERED

## 2024-04-30 PROCEDURE — 7100000000 HC PACU RECOVERY - FIRST 15 MIN: Performed by: INTERNAL MEDICINE

## 2024-04-30 RX ORDER — SODIUM CHLORIDE 9 MG/ML
INJECTION, SOLUTION INTRAVENOUS PRN
Status: DISCONTINUED | OUTPATIENT
Start: 2024-04-30 | End: 2024-05-03

## 2024-04-30 RX ORDER — PROCHLORPERAZINE EDISYLATE 5 MG/ML
5 INJECTION INTRAMUSCULAR; INTRAVENOUS
Status: DISCONTINUED | OUTPATIENT
Start: 2024-04-30 | End: 2024-04-30 | Stop reason: HOSPADM

## 2024-04-30 RX ORDER — SODIUM CHLORIDE 9 MG/ML
INJECTION, SOLUTION INTRAVENOUS PRN
Status: DISCONTINUED | OUTPATIENT
Start: 2024-04-30 | End: 2024-04-30 | Stop reason: HOSPADM

## 2024-04-30 RX ORDER — HYDRALAZINE HYDROCHLORIDE 20 MG/ML
10 INJECTION INTRAMUSCULAR; INTRAVENOUS
Status: DISCONTINUED | OUTPATIENT
Start: 2024-04-30 | End: 2024-04-30 | Stop reason: HOSPADM

## 2024-04-30 RX ORDER — FENTANYL CITRATE 50 UG/ML
25 INJECTION, SOLUTION INTRAMUSCULAR; INTRAVENOUS EVERY 5 MIN PRN
Status: DISCONTINUED | OUTPATIENT
Start: 2024-04-30 | End: 2024-04-30 | Stop reason: HOSPADM

## 2024-04-30 RX ORDER — SODIUM CHLORIDE 0.9 % (FLUSH) 0.9 %
5-40 SYRINGE (ML) INJECTION PRN
Status: DISCONTINUED | OUTPATIENT
Start: 2024-04-30 | End: 2024-04-30 | Stop reason: HOSPADM

## 2024-04-30 RX ORDER — OXYCODONE HYDROCHLORIDE 5 MG/1
5 TABLET ORAL PRN
Status: DISCONTINUED | OUTPATIENT
Start: 2024-04-30 | End: 2024-04-30 | Stop reason: HOSPADM

## 2024-04-30 RX ORDER — SODIUM CHLORIDE, SODIUM LACTATE, POTASSIUM CHLORIDE, CALCIUM CHLORIDE 600; 310; 30; 20 MG/100ML; MG/100ML; MG/100ML; MG/100ML
INJECTION, SOLUTION INTRAVENOUS CONTINUOUS PRN
Status: DISCONTINUED | OUTPATIENT
Start: 2024-04-30 | End: 2024-04-30 | Stop reason: SDUPTHER

## 2024-04-30 RX ORDER — SODIUM CHLORIDE, SODIUM LACTATE, POTASSIUM CHLORIDE, CALCIUM CHLORIDE 600; 310; 30; 20 MG/100ML; MG/100ML; MG/100ML; MG/100ML
INJECTION, SOLUTION INTRAVENOUS CONTINUOUS
Status: DISCONTINUED | OUTPATIENT
Start: 2024-04-30 | End: 2024-05-10 | Stop reason: HOSPADM

## 2024-04-30 RX ORDER — LIDOCAINE HYDROCHLORIDE 10 MG/ML
INJECTION, SOLUTION INFILTRATION; PERINEURAL PRN
Status: DISCONTINUED | OUTPATIENT
Start: 2024-04-30 | End: 2024-04-30 | Stop reason: SDUPTHER

## 2024-04-30 RX ORDER — SODIUM CHLORIDE 0.9 % (FLUSH) 0.9 %
5-40 SYRINGE (ML) INJECTION EVERY 12 HOURS SCHEDULED
Status: DISCONTINUED | OUTPATIENT
Start: 2024-04-30 | End: 2024-04-30 | Stop reason: HOSPADM

## 2024-04-30 RX ORDER — DROPERIDOL 2.5 MG/ML
0.62 INJECTION, SOLUTION INTRAMUSCULAR; INTRAVENOUS
Status: DISCONTINUED | OUTPATIENT
Start: 2024-04-30 | End: 2024-04-30 | Stop reason: HOSPADM

## 2024-04-30 RX ORDER — NALOXONE HYDROCHLORIDE 0.4 MG/ML
INJECTION, SOLUTION INTRAMUSCULAR; INTRAVENOUS; SUBCUTANEOUS PRN
Status: DISCONTINUED | OUTPATIENT
Start: 2024-04-30 | End: 2024-04-30 | Stop reason: HOSPADM

## 2024-04-30 RX ORDER — PROPOFOL 10 MG/ML
INJECTION, EMULSION INTRAVENOUS PRN
Status: DISCONTINUED | OUTPATIENT
Start: 2024-04-30 | End: 2024-04-30 | Stop reason: SDUPTHER

## 2024-04-30 RX ORDER — OXYCODONE HYDROCHLORIDE 5 MG/1
10 TABLET ORAL PRN
Status: DISCONTINUED | OUTPATIENT
Start: 2024-04-30 | End: 2024-04-30 | Stop reason: HOSPADM

## 2024-04-30 RX ORDER — SODIUM CHLORIDE, SODIUM LACTATE, POTASSIUM CHLORIDE, CALCIUM CHLORIDE 600; 310; 30; 20 MG/100ML; MG/100ML; MG/100ML; MG/100ML
INJECTION, SOLUTION INTRAVENOUS CONTINUOUS
Status: DISCONTINUED | OUTPATIENT
Start: 2024-04-30 | End: 2024-04-30

## 2024-04-30 RX ORDER — LABETALOL HYDROCHLORIDE 5 MG/ML
10 INJECTION, SOLUTION INTRAVENOUS
Status: DISCONTINUED | OUTPATIENT
Start: 2024-04-30 | End: 2024-04-30 | Stop reason: HOSPADM

## 2024-04-30 RX ORDER — LORAZEPAM 2 MG/ML
0.5 INJECTION INTRAMUSCULAR
Status: DISCONTINUED | OUTPATIENT
Start: 2024-04-30 | End: 2024-04-30 | Stop reason: HOSPADM

## 2024-04-30 RX ORDER — DIPHENHYDRAMINE HYDROCHLORIDE 50 MG/ML
12.5 INJECTION INTRAMUSCULAR; INTRAVENOUS
Status: DISCONTINUED | OUTPATIENT
Start: 2024-04-30 | End: 2024-04-30 | Stop reason: HOSPADM

## 2024-04-30 RX ADMIN — PANTOPRAZOLE SODIUM 8 MG/HR: 40 INJECTION, POWDER, FOR SOLUTION INTRAVENOUS at 11:59

## 2024-04-30 RX ADMIN — OCTREOTIDE ACETATE 50 MCG/HR: 500 INJECTION, SOLUTION INTRAVENOUS; SUBCUTANEOUS at 11:58

## 2024-04-30 RX ADMIN — PROPOFOL 50 MG: 10 INJECTION, EMULSION INTRAVENOUS at 07:46

## 2024-04-30 RX ADMIN — SODIUM CHLORIDE, POTASSIUM CHLORIDE, SODIUM LACTATE AND CALCIUM CHLORIDE: 600; 310; 30; 20 INJECTION, SOLUTION INTRAVENOUS at 11:59

## 2024-04-30 RX ADMIN — PROPOFOL 100 MG: 10 INJECTION, EMULSION INTRAVENOUS at 07:42

## 2024-04-30 RX ADMIN — METOPROLOL SUCCINATE 25 MG: 25 TABLET, EXTENDED RELEASE ORAL at 09:59

## 2024-04-30 RX ADMIN — SUCRALFATE 1 G: 1 TABLET ORAL at 12:00

## 2024-04-30 RX ADMIN — SODIUM CHLORIDE, POTASSIUM CHLORIDE, SODIUM LACTATE AND CALCIUM CHLORIDE: 600; 310; 30; 20 INJECTION, SOLUTION INTRAVENOUS at 07:38

## 2024-04-30 RX ADMIN — Medication 100 MG: at 09:59

## 2024-04-30 RX ADMIN — FOLIC ACID 1 MG: 1 TABLET ORAL at 09:58

## 2024-04-30 RX ADMIN — SODIUM CHLORIDE, POTASSIUM CHLORIDE, SODIUM LACTATE AND CALCIUM CHLORIDE: 600; 310; 30; 20 INJECTION, SOLUTION INTRAVENOUS at 09:12

## 2024-04-30 RX ADMIN — SUCRALFATE 1 G: 1 TABLET ORAL at 16:56

## 2024-04-30 RX ADMIN — SODIUM CHLORIDE, PRESERVATIVE FREE 10 ML: 5 INJECTION INTRAVENOUS at 09:59

## 2024-04-30 RX ADMIN — SODIUM CHLORIDE, PRESERVATIVE FREE 10 ML: 5 INJECTION INTRAVENOUS at 20:13

## 2024-04-30 RX ADMIN — SODIUM CHLORIDE, POTASSIUM CHLORIDE, SODIUM LACTATE AND CALCIUM CHLORIDE: 600; 310; 30; 20 INJECTION, SOLUTION INTRAVENOUS at 16:56

## 2024-04-30 RX ADMIN — LIDOCAINE HYDROCHLORIDE 50 MG: 10 INJECTION, SOLUTION INFILTRATION; PERINEURAL at 07:41

## 2024-04-30 RX ADMIN — PANTOPRAZOLE SODIUM 8 MG/HR: 40 INJECTION, POWDER, FOR SOLUTION INTRAVENOUS at 22:04

## 2024-04-30 RX ADMIN — PROPOFOL 50 MG: 10 INJECTION, EMULSION INTRAVENOUS at 07:43

## 2024-04-30 RX ADMIN — ONDANSETRON 4 MG: 2 INJECTION INTRAMUSCULAR; INTRAVENOUS at 16:57

## 2024-04-30 RX ADMIN — ATORVASTATIN CALCIUM 40 MG: 40 TABLET, FILM COATED ORAL at 20:14

## 2024-04-30 RX ADMIN — SUCRALFATE 1 G: 1 TABLET ORAL at 20:13

## 2024-04-30 RX ADMIN — OCTREOTIDE ACETATE 50 MCG/HR: 500 INJECTION, SOLUTION INTRAVENOUS; SUBCUTANEOUS at 22:06

## 2024-04-30 RX ADMIN — Medication 1 TABLET: at 09:58

## 2024-04-30 ASSESSMENT — PAIN - FUNCTIONAL ASSESSMENT: PAIN_FUNCTIONAL_ASSESSMENT: 0-10

## 2024-04-30 ASSESSMENT — PAIN SCALES - GENERAL
PAINLEVEL_OUTOF10: 0

## 2024-04-30 NOTE — PLAN OF CARE
Problem: Discharge Planning  Goal: Discharge to home or other facility with appropriate resources  4/29/2024 2205 by Georgette Lewis RN  Outcome: Progressing  4/29/2024 0927 by Arpit Montoya RN  Outcome: Progressing  Flowsheets (Taken 4/29/2024 0800)  Discharge to home or other facility with appropriate resources:   Identify barriers to discharge with patient and caregiver   Arrange for needed discharge resources and transportation as appropriate   Identify discharge learning needs (meds, wound care, etc)   Refer to discharge planning if patient needs post-hospital services based on physician order or complex needs related to functional status, cognitive ability or social support system     Problem: Safety - Adult  Goal: Free from fall injury  4/29/2024 2205 by Georgette Lewis RN  Outcome: Progressing  Flowsheets (Taken 4/29/2024 2204)  Free From Fall Injury: Instruct family/caregiver on patient safety  4/29/2024 0927 by Arpit Montoya RN  Outcome: Progressing  Flowsheets (Taken 4/29/2024 0800)  Free From Fall Injury: Instruct family/caregiver on patient safety     Problem: ABCDS Injury Assessment  Goal: Absence of physical injury  4/29/2024 2205 by Georgette Lewis RN  Outcome: Progressing  Flowsheets (Taken 4/29/2024 2204)  Absence of Physical Injury: Implement safety measures based on patient assessment  4/29/2024 0927 by Arpit Montoya RN  Outcome: Progressing  Flowsheets (Taken 4/29/2024 0800)  Absence of Physical Injury: Implement safety measures based on patient assessment     Problem: Pain  Goal: Verbalizes/displays adequate comfort level or baseline comfort level  4/29/2024 2205 by Georgette Lewis RN  Outcome: Progressing  4/29/2024 0927 by Arpit Montoya RN  Outcome: Progressing  Flowsheets (Taken 4/29/2024 0800)  Verbalizes/displays adequate comfort level or baseline comfort level:   Encourage patient to monitor pain and request assistance   Assess pain using appropriate pain scale

## 2024-04-30 NOTE — ANESTHESIA PRE PROCEDURE
Department of Anesthesiology  Preprocedure Note       Name:  Angela Ghotra   Age:  54 y.o.  :  1970                                          MRN:  [id number]         Date:  2024      Surgeon: Surgeon(s):  Troy Rubio MD    Procedure: Procedure(s):  ESOPHAGOGASTRODUODENOSCOPY    Medications prior to admission:   Prior to Admission medications    Medication Sig Start Date End Date Taking? Authorizing Provider   atorvastatin (LIPITOR) 40 MG tablet Take 1 tablet by mouth nightly 24   Marilee Bautista MD   metoprolol succinate (TOPROL XL) 25 MG extended release tablet Take 1 tablet by mouth daily 24   Marilee Bautista MD   thiamine 100 MG tablet Take 1 tablet by mouth daily 24   Marilee Bautista MD   folic acid (FOLVITE) 1 MG tablet Take 1 tablet by mouth daily 24   Marilee Bautista MD   Multiple Vitamins-Minerals (MULTIVITAMIN WITH MINERALS) tablet Take 1 tablet by mouth daily 24   Marilee Bautista MD   pantoprazole (PROTONIX) 40 MG tablet Take 1 tablet by mouth 2 times daily (before meals) 24   Marilee Bautista MD   sucralfate (CARAFATE) 1 GM tablet Take 1 tablet by mouth 4 times daily 24   Marilee Bautista MD       Current medications:    Current Facility-Administered Medications   Medication Dose Route Frequency Provider Last Rate Last Admin   • atorvastatin (LIPITOR) tablet 40 mg  40 mg Oral Nightly Troy Rubio MD   40 mg at 24   • folic acid (FOLVITE) tablet 1 mg  1 mg Oral Daily Troy Rubio MD   1 mg at 24   • metoprolol succinate (TOPROL XL) extended release tablet 25 mg  25 mg Oral Daily Troy Rubio MD   25 mg at 24   • therapeutic multivitamin-minerals 1 tablet  1 tablet Oral Daily Troy Rubio MD   1 tablet at 24   • sucralfate (CARAFATE) tablet 1 g  1 g Oral 4x Daily Troy Rubio MD   1 g at 24   • thiamine tablet 100 mg  100 mg Oral Daily Troy Rubio MD   100 mg at 24 0738   • 0.9

## 2024-04-30 NOTE — OP NOTE
PROCEDURE NOTE    DATE OF PROCEDURE: 4/30/2024     SURGEON: Troy Rubio MD  Facility: [facility]  ASSISTANT: None  Anesthesia: Monitored anesthesia care  PREOPERATIVE DIAGNOSIS: Upper GI bleed    Diagnosis:    POSTOPERATIVE DIAGNOSIS: As described below    OPERATION: Upper GI endoscopy with Biopsy    ANESTHESIA: Moderate Sedation     ESTIMATED BLOOD LOSS: Less than 50 ml    COMPLICATIONS: None.     SPECIMENS:  Was Not Obtained    HISTORY: The patient is a 54 y.o. year old male with history of above preop diagnosis.  I recommended esophagogastroduodenoscopy with possible biopsy and I explained the risk, benefits, expected outcome, and alternatives to the procedure.  Risks included but are not limited to bleeding, infection, respiratory distress, hypotension, and perforation of the esophagus, stomach, or duodenum.  Patient understands and is in agreement.      PROCEDURE: The patient was given IV conscious sedation.  The patient's SPO2 remained above 90% throughout the procedure.The gastroscope was inserted orally and advanced under direct vision through the esophagus, through the stomach, through the pylorus, and into the descending duodenum.      Post sedation note :The patient's SPO2 remained above 90% throughout the procedure.the vital signs remained stable , and no immediate complication form the procedure noted, patient will be ready for d/c when criteria is met .      Findings:    Retropharyngeal area was grossly normal appearing    Esophagus: normal;     Esophagogastric markings: Diaphragmatic hiatus- 36 cm; GE junction- 36 cm; Squamo-columnar junction- 36 cm    Stomach:    Fundus: abnormal: small gastric varices with portal hypertensive gastropathy  (IGV1)    Body: normal    Antrum: normal    Duodenum:     Descending: normal    Bulb: normal      The scope was removed and the patient tolerated the procedure well.     Impression- Gastric varices (IGV1), R/o- splenic vein thrombosis    Recommendations/Plan:

## 2024-04-30 NOTE — PROGRESS NOTES
INTENSIVE CARE UNIT  Resident Physician Progress Note    Patient - Angela Ghotra  Date of Admission -  2024  7:41 PM  Date of Evaluation -  2024  Room and Bed Number -  [facility] OR [place] [facility]/NONE   Hospital Day - 3      SUBJECTIVE:     OVERNIGHT EVENTS:      NAONE    TODAY:  VITALS - 's- 150's. On Toprol 25 mg OD.  Saturating well on room air.   On IV Fluids 100 ml/hr NS. UO 3.75 L. Net + 4 L.    LABS -   Cr - 1.7 (baseline)  Cl - 111  Hb 8.4  Platelets 110-->100    IMAGING -   USG Liver - No cirrhosis on USG. Portal vein patent with hepatopetal flow. Mildly dilated pancreatic duct.    Patient is currently in OR for EGD    AWAKE & FOLLOWING COMMANDS:  [] No   [x] Yes    SECRETIONS Amount:  [] Small [] Moderate  [] Large  [x] None  Color:     [] White [] Colored  [] Bloody    SEDATION:  RAAS Score:  [] Propofol gtt  [] Versed gtt  [] Ativan gtt   [x] No Sedation    PARALYZED:  [x] No    [] Yes    VASOPRESSORS:  [x] No    [] Yes  [] Levophed [] Dopamine [] Vasopressin  [] Dobutamine [] Phenylephrine [] Epinephrine      OBJECTIVE:     VITAL SIGNS:  BP (!) 152/92   Pulse 71   Temp 97.7 °F (36.5 °C) (Temporal)   Resp 20   Ht 1.702 m (5' 7\")   Wt 61.2 kg (135 lb)   SpO2 100%   BMI 21.14 kg/m²   Tmax over 24 hours:  Temp (24hrs), Av.2 °F (36.8 °C), Min:97.7 °F (36.5 °C), Max:98.3 °F (36.8 °C)      Patient Vitals for the past 8 hrs:   BP Temp Temp src Pulse Resp SpO2 Height Weight   24 0648 (!) 152/92 97.7 °F (36.5 °C) Temporal 71 20 100 % 1.702 m (5' 7\") 61.2 kg (135 lb)   24 0600 139/87 -- -- 63 (!) 5 97 % -- --   24 0500 (!) 147/71 -- -- 59 18 97 % -- --   24 0403 (!) 157/92 -- -- -- -- -- -- --   24 0400 (!) 157/92 98.3 °F (36.8 °C) Oral 59 12 98 % -- --   24 0300 (!) 149/89 -- -- 64 16 93 % -- --   24 0200 (!) 148/82 -- -- 71 18 96 % -- --   24 0100 (!) 150/74 -- -- 64 18 93 % -- --   24 0000 (!) 151/93 98.2 °F (36.8 °C) Oral 71 16 98 % -- --  kidney disease) 04/28/2024    Alcohol use 04/08/2024    Schatzki's ring 04/08/2024    NSTEMI (non-ST elevated myocardial infarction) ([facility]) 04/05/2024    GI bleed 04/04/2024          PLAN:     WEAN PER PROTOCOL:  [] No   [] Yes  [x] N/A    ICU PROPHYLAXIS:  Stress ulcer:  [] PPI Agent  [x] [id number] [] Sucralfate  [] Other:  VTE:   [] Enoxaparin  [] Unfract. Heparin Subcut  [] EPC Cuffs    NUTRITION:  [] NPO [] Tube Feeding (Specify: ) [] TPN  [x] PO    HOME MEDS RECONCILED: [] No  [x] Yes    CONSULTATION NEEDED:  [] No  [x] Yes    FAMILY UPDATED:    [] No  [x] Yes    TRANSFER OUT OF ICU:   [] No  [] Yes        Plan:    Neuro:  AOX4   Neuro checks per protocol   Hx of alcoholism   CIWA protocol   Monitor for withdrawal   Folic acid and thiamine      Resp:  Good O2 saturation on RA  CXR: wnl      CV:  Normotensive  On Toprol XL 25 mg OD   Elevated troponin likely Type II NSTEMI (Demand ischemia)   EKG was non ischemic      GI/Nutrition:  Acute blood loss anemia, hematemesis, melena   GI consulted, performed EGD today.   GI was unable to visualize bleeding, there was a large amount of blood and blood clots in the stomach   S/p 3u prbc   On protonix and octreotide drip  4/30 - Repeat  EGD      /Fluids/Electrolytes:  MIVF: NS @ 100ml/hr   Monitor I/Os   Daily BMPs   Replace electrolytes prn     Heme:  Hgb of 6 on admission   Received 3u prbc   Most recent hgb was 7.6      ID:  WBC downtrending - likely reactive  Afebrile  Cultures no growth yet   Monitor for infection       Prophylaxis:  DVT: None as patient has GI bleed   GI: protonix      Dispo:  Monitor in ICU       Melinda Storey MD  Internal Medicine PGY1  4/30/2024 7:19 AM     Attending Physician Statement  I have discussed the care of Angela Ghotra, including pertinent history and exam findings with the resident. I have reviewed the key elements of all parts of the encounter with the resident. I have seen and examined the patient with the resident.  I agree with the  assessment and plan and status of the problem list as documented.    I saw the patient during rounds today, chart reviewed overnight events noted.  Patient had not had any more episodes of hematemesis or bleeding per rectum.  His hemoglobin was stable and above 8.  Liver ultrasound was done yesterday and liver ultrasound did not show evidence of cirrhosis and patent portal vein.  Seen by GI today and they have ordered the splenic Doppler and also EGD was done apparently no bleeding was seen but EGD report is not available currently as EGD was just done.  Patient is on Protonix drip and octreotide drip will ask GI to be able to continue with both.  Will transfer patient out of ICU to the medicine floor and critical care team will sign off once on the floor.      Discussed with nursing staff, treatment and plan discussed.      Total critical care time caring for this patient with life threatening, unstable organ failure, including direct patient contact, management of life support systems, review of data including imaging and labs, discussions with other team members and physicians at least 30  Min so far today, excluding procedures.       Please note that this chart was generated using voice recognition Dragon dictation software. Although every effort was made to ensure the accuracy of this automated transcription, some errors in transcription may have occurred.     Benjy Abdi MD  4/30/2024 11:00 AM

## 2024-04-30 NOTE — PROGRESS NOTES
Critical care team - Resident sign-out to medicine service      Date and time: 4/30/2024 12:18 PM  Patient's name:  Angela Ghotra  Medical Record Number: [id number]  Patient's account/billing number: [id number]  Patient's YOB: 1970  Age: 54 y.o.  Date of Admission: 4/27/2024  7:41 PM  Length of stay during current admission: 3    Primary Care Physician: No primary care provider on file.    Code Status: Full Code    Mode of physician to physician communication:        [] Via telephone   [] In person     Date and time of sign-out: 4/30/2024 12:18 PM    Accepting Internal Medicine resident: Caitie Donahue    Accepting Medicine team: Elenaed    Accepting team's attending: Dr. Bautista    Patient's current ICU Bed:  3027    Patient's assigned bed on floor:  446        [] Med-Surg Monitored [x] Step-down       [] Psychiatry ICU       [] Psych floor     Reason for ICU admission:   GI Bleed  ICU course summary:     Angela Ghotra is a 54 y.o. PMH of Ckd, HTN, alcohol use disorder, tobacco use disorder, gastric varices, portal hypertension presented to the ED due to episodes of hematemesis and melena. While in the ED patient had additional episode of hematemesis.   Patient had hemoglobin of 6, has received 3u of prbc, last hemoglobin was 7.6. Had WBC of 16.9.   CTA of the abdomen and pelvis showed no active GI bleed.   Critical care was consulted while the patient was in the PACU bu GI.   Patient went for an EGD today. GI stated that the patient had a large amount of blood in the stomach which limited visualization.   Patient is on protonix and ocreotide drip.      Patient admitted to the ICU due to risk for GI hemorrhage.     Patient had RUQ scan which did not reveal cirrhosis. Patient had EGD today which revealed small gastric varices with portal hypertensive gastropathy.Ordered Doppler USG of Spleen.        Current Vitals:     BP (!) 174/99   Pulse 70   Temp 98.4 °F (36.9 °C) (Temporal)   Resp 19   Ht 1.702 m

## 2024-04-30 NOTE — ANESTHESIA POSTPROCEDURE EVALUATION
Department of Anesthesiology  Postprocedure Note    Patient: Angela Ghotra  MRN: [id number]  YOB: 1970  Date of evaluation: 4/30/2024    Procedure Summary       Date: 04/30/24 Room / Location: [address]    Anesthesia Start: 0738 Anesthesia Stop: 0800    Procedure: ESOPHAGOGASTRODUODENOSCOPY Diagnosis:       Acute upper GI bleed      (Acute upper GI bleed [K92.2])    Surgeons: Troy Rubio MD Responsible Provider: Juan Fraga MD    Anesthesia Type: MAC ASA Status: 3            Anesthesia Type: MAC    Brennon Phase I: Brennon Score: 10    Brennon Phase II:      Anesthesia Post Evaluation    Patient location during evaluation: PACU  Patient participation: complete - patient participated  Level of consciousness: awake and alert  Airway patency: patent  Nausea & Vomiting: no nausea and no vomiting  Cardiovascular status: blood pressure returned to baseline  Respiratory status: acceptable  Hydration status: euvolemic  Pain management: adequate    No notable events documented.

## 2024-05-01 ENCOUNTER — ANESTHESIA (OUTPATIENT)
Dept: OPERATING ROOM | Age: 54
End: 2024-05-01
Payer: COMMERCIAL

## 2024-05-01 ENCOUNTER — APPOINTMENT (OUTPATIENT)
Dept: ULTRASOUND IMAGING | Age: 54
DRG: 377 | End: 2024-05-01
Payer: COMMERCIAL

## 2024-05-01 ENCOUNTER — ANESTHESIA EVENT (OUTPATIENT)
Dept: OPERATING ROOM | Age: 54
End: 2024-05-01
Payer: COMMERCIAL

## 2024-05-01 LAB
ABO/RH: NORMAL
ALBUMIN SERPL-MCNC: 2.3 G/DL (ref 3.5–5.2)
ALBUMIN/GLOB SERPL: 3 {RATIO} (ref 1–2.5)
ALP SERPL-CCNC: 25 U/L (ref 40–129)
ALT SERPL-CCNC: 9 U/L (ref 10–50)
ANION GAP SERPL CALCULATED.3IONS-SCNC: 6 MMOL/L (ref 9–16)
ANTIBODY SCREEN: NEGATIVE
ARM BAND NUMBER: NORMAL
AST SERPL-CCNC: 20 U/L (ref 10–50)
BASOPHILS # BLD: 0.03 K/UL (ref 0–0.2)
BASOPHILS NFR BLD: 0 % (ref 0–2)
BILIRUB SERPL-MCNC: 0.7 MG/DL (ref 0–1.2)
BLOOD BANK BLOOD PRODUCT EXPIRATION DATE: NORMAL
BLOOD BANK DISPENSE STATUS: NORMAL
BLOOD BANK ISBT PRODUCT BLOOD TYPE: 600
BLOOD BANK ISBT PRODUCT BLOOD TYPE: 6200
BLOOD BANK PRODUCT CODE: NORMAL
BLOOD BANK SAMPLE EXPIRATION: NORMAL
BLOOD BANK UNIT TYPE AND RH: NORMAL
BPU ID: NORMAL
BUN SERPL-MCNC: 27 MG/DL (ref 6–20)
CALCIUM SERPL-MCNC: 7.5 MG/DL (ref 8.6–10.4)
CHLORIDE SERPL-SCNC: 111 MMOL/L (ref 98–107)
CO2 SERPL-SCNC: 21 MMOL/L (ref 20–31)
COMPONENT: NORMAL
CREAT SERPL-MCNC: 1.8 MG/DL (ref 0.7–1.2)
CROSSMATCH RESULT: NORMAL
EOSINOPHIL # BLD: 0.41 K/UL (ref 0–0.44)
EOSINOPHILS RELATIVE PERCENT: 4 % (ref 1–4)
ERYTHROCYTE [DISTWIDTH] IN BLOOD BY AUTOMATED COUNT: 17.2 % (ref 11.8–14.4)
GFR, ESTIMATED: 44 ML/MIN/1.73M2
GLUCOSE SERPL-MCNC: 103 MG/DL (ref 74–99)
HCT VFR BLD AUTO: 20.9 % (ref 40.7–50.3)
HCT VFR BLD AUTO: 23.1 % (ref 40.7–50.3)
HGB BLD-MCNC: 6.7 G/DL (ref 13–17)
HGB BLD-MCNC: 7.4 G/DL (ref 13–17)
IMM GRANULOCYTES # BLD AUTO: 0.08 K/UL (ref 0–0.3)
IMM GRANULOCYTES NFR BLD: 1 %
LYMPHOCYTES NFR BLD: 1.16 K/UL (ref 1.1–3.7)
LYMPHOCYTES RELATIVE PERCENT: 11 % (ref 24–43)
MCH RBC QN AUTO: 29.2 PG (ref 25.2–33.5)
MCHC RBC AUTO-ENTMCNC: 32 G/DL (ref 28.4–34.8)
MCV RBC AUTO: 91.3 FL (ref 82.6–102.9)
MONOCYTES NFR BLD: 0.78 K/UL (ref 0.1–1.2)
MONOCYTES NFR BLD: 8 % (ref 3–12)
NEUTROPHILS NFR BLD: 76 % (ref 36–65)
NEUTS SEG NFR BLD: 7.94 K/UL (ref 1.5–8.1)
NRBC BLD-RTO: 0.8 PER 100 WBC
PLATELET # BLD AUTO: 91 K/UL (ref 138–453)
PMV BLD AUTO: 10.9 FL (ref 8.1–13.5)
POTASSIUM SERPL-SCNC: 4.2 MMOL/L (ref 3.7–5.3)
PROT SERPL-MCNC: 3.2 G/DL (ref 6.6–8.7)
RBC # BLD AUTO: 2.53 M/UL (ref 4.21–5.77)
RBC # BLD: ABNORMAL 10*6/UL
SODIUM SERPL-SCNC: 138 MMOL/L (ref 136–145)
TRANSFUSION STATUS: NORMAL
UNIT DIVISION: 0
UNIT ISSUE DATE/TIME: NORMAL
WBC OTHER # BLD: 10.4 K/UL (ref 3.5–11.3)

## 2024-05-01 PROCEDURE — 85025 COMPLETE CBC W/AUTO DIFF WBC: CPT

## 2024-05-01 PROCEDURE — 6370000000 HC RX 637 (ALT 250 FOR IP): Performed by: INTERNAL MEDICINE

## 2024-05-01 PROCEDURE — 7100000000 HC PACU RECOVERY - FIRST 15 MIN: Performed by: INTERNAL MEDICINE

## 2024-05-01 PROCEDURE — 2580000003 HC RX 258

## 2024-05-01 PROCEDURE — 85014 HEMATOCRIT: CPT

## 2024-05-01 PROCEDURE — 6360000002 HC RX W HCPCS

## 2024-05-01 PROCEDURE — 85018 HEMOGLOBIN: CPT

## 2024-05-01 PROCEDURE — 0DD68ZX EXTRACTION OF STOMACH, VIA NATURAL OR ARTIFICIAL OPENING ENDOSCOPIC, DIAGNOSTIC: ICD-10-PCS | Performed by: INTERNAL MEDICINE

## 2024-05-01 PROCEDURE — C9113 INJ PANTOPRAZOLE SODIUM, VIA: HCPCS | Performed by: STUDENT IN AN ORGANIZED HEALTH CARE EDUCATION/TRAINING PROGRAM

## 2024-05-01 PROCEDURE — 36415 COLL VENOUS BLD VENIPUNCTURE: CPT

## 2024-05-01 PROCEDURE — 2580000003 HC RX 258: Performed by: STUDENT IN AN ORGANIZED HEALTH CARE EDUCATION/TRAINING PROGRAM

## 2024-05-01 PROCEDURE — 76975 GI ENDOSCOPIC ULTRASOUND: CPT | Performed by: INTERNAL MEDICINE

## 2024-05-01 PROCEDURE — 6360000002 HC RX W HCPCS: Performed by: STUDENT IN AN ORGANIZED HEALTH CARE EDUCATION/TRAINING PROGRAM

## 2024-05-01 PROCEDURE — 2580000003 HC RX 258: Performed by: INTERNAL MEDICINE

## 2024-05-01 PROCEDURE — 2000000000 HC ICU R&B

## 2024-05-01 PROCEDURE — 99291 CRITICAL CARE FIRST HOUR: CPT | Performed by: INTERNAL MEDICINE

## 2024-05-01 PROCEDURE — 3700000001 HC ADD 15 MINUTES (ANESTHESIA): Performed by: INTERNAL MEDICINE

## 2024-05-01 PROCEDURE — 2500000003 HC RX 250 WO HCPCS

## 2024-05-01 PROCEDURE — 36430 TRANSFUSION BLD/BLD COMPNT: CPT

## 2024-05-01 PROCEDURE — 43242 EGD US FINE NEEDLE BX/ASPIR: CPT | Performed by: INTERNAL MEDICINE

## 2024-05-01 PROCEDURE — 94761 N-INVAS EAR/PLS OXIMETRY MLT: CPT

## 2024-05-01 PROCEDURE — 80053 COMPREHEN METABOLIC PANEL: CPT

## 2024-05-01 PROCEDURE — 7100000001 HC PACU RECOVERY - ADDTL 15 MIN: Performed by: INTERNAL MEDICINE

## 2024-05-01 PROCEDURE — P9016 RBC LEUKOCYTES REDUCED: HCPCS

## 2024-05-01 PROCEDURE — 88341 IMHCHEM/IMCYTCHM EA ADD ANTB: CPT

## 2024-05-01 PROCEDURE — 3609020800 HC EGD W/EUS FNA: Performed by: INTERNAL MEDICINE

## 2024-05-01 PROCEDURE — 88342 IMHCHEM/IMCYTCHM 1ST ANTB: CPT

## 2024-05-01 PROCEDURE — 2720000010 HC SURG SUPPLY STERILE: Performed by: INTERNAL MEDICINE

## 2024-05-01 PROCEDURE — 6360000002 HC RX W HCPCS: Performed by: INTERNAL MEDICINE

## 2024-05-01 PROCEDURE — 88305 TISSUE EXAM BY PATHOLOGIST: CPT

## 2024-05-01 PROCEDURE — 3700000000 HC ANESTHESIA ATTENDED CARE: Performed by: INTERNAL MEDICINE

## 2024-05-01 PROCEDURE — C9113 INJ PANTOPRAZOLE SODIUM, VIA: HCPCS | Performed by: INTERNAL MEDICINE

## 2024-05-01 RX ORDER — HYDRALAZINE HYDROCHLORIDE 20 MG/ML
10 INJECTION INTRAMUSCULAR; INTRAVENOUS
Status: DISCONTINUED | OUTPATIENT
Start: 2024-05-01 | End: 2024-05-01 | Stop reason: HOSPADM

## 2024-05-01 RX ORDER — GLYCOPYRROLATE 0.2 MG/ML
INJECTION INTRAMUSCULAR; INTRAVENOUS PRN
Status: DISCONTINUED | OUTPATIENT
Start: 2024-05-01 | End: 2024-05-01 | Stop reason: SDUPTHER

## 2024-05-01 RX ORDER — SODIUM CHLORIDE 0.9 % (FLUSH) 0.9 %
5-40 SYRINGE (ML) INJECTION EVERY 12 HOURS SCHEDULED
Status: DISCONTINUED | OUTPATIENT
Start: 2024-05-01 | End: 2024-05-01 | Stop reason: HOSPADM

## 2024-05-01 RX ORDER — SODIUM CHLORIDE, SODIUM LACTATE, POTASSIUM CHLORIDE, CALCIUM CHLORIDE 600; 310; 30; 20 MG/100ML; MG/100ML; MG/100ML; MG/100ML
INJECTION, SOLUTION INTRAVENOUS CONTINUOUS PRN
Status: DISCONTINUED | OUTPATIENT
Start: 2024-05-01 | End: 2024-05-01 | Stop reason: SDUPTHER

## 2024-05-01 RX ORDER — LIDOCAINE HYDROCHLORIDE 10 MG/ML
INJECTION, SOLUTION EPIDURAL; INFILTRATION; INTRACAUDAL; PERINEURAL PRN
Status: DISCONTINUED | OUTPATIENT
Start: 2024-05-01 | End: 2024-05-01 | Stop reason: SDUPTHER

## 2024-05-01 RX ORDER — FENTANYL CITRATE 50 UG/ML
50 INJECTION, SOLUTION INTRAMUSCULAR; INTRAVENOUS EVERY 5 MIN PRN
Status: DISCONTINUED | OUTPATIENT
Start: 2024-05-01 | End: 2024-05-01 | Stop reason: HOSPADM

## 2024-05-01 RX ORDER — FENTANYL CITRATE 50 UG/ML
25 INJECTION, SOLUTION INTRAMUSCULAR; INTRAVENOUS EVERY 5 MIN PRN
Status: DISCONTINUED | OUTPATIENT
Start: 2024-05-01 | End: 2024-05-01 | Stop reason: HOSPADM

## 2024-05-01 RX ORDER — PROPOFOL 10 MG/ML
INJECTION, EMULSION INTRAVENOUS CONTINUOUS PRN
Status: DISCONTINUED | OUTPATIENT
Start: 2024-05-01 | End: 2024-05-01 | Stop reason: SDUPTHER

## 2024-05-01 RX ORDER — NALOXONE HYDROCHLORIDE 0.4 MG/ML
INJECTION, SOLUTION INTRAMUSCULAR; INTRAVENOUS; SUBCUTANEOUS PRN
Status: DISCONTINUED | OUTPATIENT
Start: 2024-05-01 | End: 2024-05-01 | Stop reason: HOSPADM

## 2024-05-01 RX ORDER — DEXMEDETOMIDINE HYDROCHLORIDE 100 UG/ML
INJECTION, SOLUTION INTRAVENOUS PRN
Status: DISCONTINUED | OUTPATIENT
Start: 2024-05-01 | End: 2024-05-01 | Stop reason: SDUPTHER

## 2024-05-01 RX ORDER — SODIUM CHLORIDE 0.9 % (FLUSH) 0.9 %
5-40 SYRINGE (ML) INJECTION PRN
Status: DISCONTINUED | OUTPATIENT
Start: 2024-05-01 | End: 2024-05-01 | Stop reason: HOSPADM

## 2024-05-01 RX ORDER — ONDANSETRON 2 MG/ML
4 INJECTION INTRAMUSCULAR; INTRAVENOUS
Status: DISCONTINUED | OUTPATIENT
Start: 2024-05-01 | End: 2024-05-01 | Stop reason: HOSPADM

## 2024-05-01 RX ORDER — PHENYLEPHRINE HCL IN 0.9% NACL 1 MG/10 ML
SYRINGE (ML) INTRAVENOUS PRN
Status: DISCONTINUED | OUTPATIENT
Start: 2024-05-01 | End: 2024-05-01 | Stop reason: SDUPTHER

## 2024-05-01 RX ORDER — SODIUM CHLORIDE 9 MG/ML
INJECTION, SOLUTION INTRAVENOUS PRN
Status: DISCONTINUED | OUTPATIENT
Start: 2024-05-01 | End: 2024-05-03

## 2024-05-01 RX ORDER — PROPOFOL 10 MG/ML
INJECTION, EMULSION INTRAVENOUS PRN
Status: DISCONTINUED | OUTPATIENT
Start: 2024-05-01 | End: 2024-05-01 | Stop reason: SDUPTHER

## 2024-05-01 RX ORDER — SODIUM CHLORIDE 9 MG/ML
INJECTION, SOLUTION INTRAVENOUS PRN
Status: DISCONTINUED | OUTPATIENT
Start: 2024-05-01 | End: 2024-05-01 | Stop reason: HOSPADM

## 2024-05-01 RX ADMIN — Medication 100 MG: at 08:41

## 2024-05-01 RX ADMIN — PROPOFOL 50 MG: 10 INJECTION, EMULSION INTRAVENOUS at 18:35

## 2024-05-01 RX ADMIN — GLYCOPYRROLATE 0.1 MG: 0.2 INJECTION INTRAMUSCULAR; INTRAVENOUS at 18:30

## 2024-05-01 RX ADMIN — SODIUM CHLORIDE, PRESERVATIVE FREE 10 ML: 5 INJECTION INTRAVENOUS at 08:39

## 2024-05-01 RX ADMIN — OCTREOTIDE ACETATE 50 MCG/HR: 500 INJECTION, SOLUTION INTRAVENOUS; SUBCUTANEOUS at 23:50

## 2024-05-01 RX ADMIN — SUCRALFATE 1 G: 1 TABLET ORAL at 20:14

## 2024-05-01 RX ADMIN — SODIUM CHLORIDE, POTASSIUM CHLORIDE, SODIUM LACTATE AND CALCIUM CHLORIDE: 600; 310; 30; 20 INJECTION, SOLUTION INTRAVENOUS at 16:53

## 2024-05-01 RX ADMIN — PANTOPRAZOLE SODIUM 8 MG/HR: 40 INJECTION, POWDER, FOR SOLUTION INTRAVENOUS at 08:42

## 2024-05-01 RX ADMIN — SUCRALFATE 1 G: 1 TABLET ORAL at 16:57

## 2024-05-01 RX ADMIN — SUCRALFATE 1 G: 1 TABLET ORAL at 12:31

## 2024-05-01 RX ADMIN — SUCRALFATE 1 G: 1 TABLET ORAL at 08:41

## 2024-05-01 RX ADMIN — SODIUM CHLORIDE, POTASSIUM CHLORIDE, SODIUM LACTATE AND CALCIUM CHLORIDE: 600; 310; 30; 20 INJECTION, SOLUTION INTRAVENOUS at 08:44

## 2024-05-01 RX ADMIN — PANTOPRAZOLE SODIUM 8 MG/HR: 40 INJECTION, POWDER, FOR SOLUTION INTRAVENOUS at 23:49

## 2024-05-01 RX ADMIN — PROPOFOL 150 MCG/KG/MIN: 10 INJECTION, EMULSION INTRAVENOUS at 18:30

## 2024-05-01 RX ADMIN — Medication 1 TABLET: at 08:40

## 2024-05-01 RX ADMIN — DEXMEDETOMIDINE HYDROCHLORIDE 4 MCG: 100 INJECTION, SOLUTION INTRAVENOUS at 18:35

## 2024-05-01 RX ADMIN — PROPOFOL 20 MG: 10 INJECTION, EMULSION INTRAVENOUS at 18:55

## 2024-05-01 RX ADMIN — PANTOPRAZOLE SODIUM 8 MG/HR: 40 INJECTION, POWDER, FOR SOLUTION INTRAVENOUS at 10:51

## 2024-05-01 RX ADMIN — Medication 100 MCG: at 18:49

## 2024-05-01 RX ADMIN — ATORVASTATIN CALCIUM 40 MG: 40 TABLET, FILM COATED ORAL at 20:16

## 2024-05-01 RX ADMIN — FOLIC ACID 1 MG: 1 TABLET ORAL at 08:41

## 2024-05-01 RX ADMIN — DEXMEDETOMIDINE HYDROCHLORIDE 4 MCG: 100 INJECTION, SOLUTION INTRAVENOUS at 18:33

## 2024-05-01 RX ADMIN — DEXMEDETOMIDINE HYDROCHLORIDE 4 MCG: 100 INJECTION, SOLUTION INTRAVENOUS at 18:30

## 2024-05-01 RX ADMIN — PROPOFOL 20 MG: 10 INJECTION, EMULSION INTRAVENOUS at 18:32

## 2024-05-01 RX ADMIN — OCTREOTIDE ACETATE 50 MCG/HR: 500 INJECTION, SOLUTION INTRAVENOUS; SUBCUTANEOUS at 10:52

## 2024-05-01 RX ADMIN — SODIUM CHLORIDE, POTASSIUM CHLORIDE, SODIUM LACTATE AND CALCIUM CHLORIDE: 600; 310; 30; 20 INJECTION, SOLUTION INTRAVENOUS at 18:24

## 2024-05-01 RX ADMIN — METOPROLOL SUCCINATE 25 MG: 25 TABLET, EXTENDED RELEASE ORAL at 08:40

## 2024-05-01 RX ADMIN — PROPOFOL 30 MG: 10 INJECTION, EMULSION INTRAVENOUS at 18:30

## 2024-05-01 RX ADMIN — SODIUM CHLORIDE, PRESERVATIVE FREE 10 ML: 5 INJECTION INTRAVENOUS at 20:18

## 2024-05-01 RX ADMIN — DEXMEDETOMIDINE HYDROCHLORIDE 4 MCG: 100 INJECTION, SOLUTION INTRAVENOUS at 18:54

## 2024-05-01 RX ADMIN — LIDOCAINE HYDROCHLORIDE 50 MG: 10 INJECTION, SOLUTION EPIDURAL; INFILTRATION; INTRACAUDAL; PERINEURAL at 18:30

## 2024-05-01 ASSESSMENT — PAIN SCALES - GENERAL
PAINLEVEL_OUTOF10: 0

## 2024-05-01 NOTE — ANESTHESIA PRE PROCEDURE
and Prophylactic antiemetics administered.  Anesthetic plan and risks discussed with patient.    Use of blood products discussed with patient whom consented to blood products.    Plan discussed with CRNA.                Miguelangel Welsh MD   5/1/2024

## 2024-05-01 NOTE — OP NOTE
PROCEDURE NOTE    DATE OF PROCEDURE: 5/1/2024     SURGEON: Troy Rubio MD  Facility: [facility]  ASSISTANT: None  Anesthesia: Monitored anesthesia care  PREOPERATIVE DIAGNOSIS: GI bleed with possible isolated gastric varices    Diagnosis:    POSTOPERATIVE DIAGNOSIS: As described below    OPERATION: Upper GI endoscopy with Endoscopic ultrasound with FNA    ANESTHESIA: Moderate Sedation     ESTIMATED BLOOD LOSS: Less than 50 ml    COMPLICATIONS: None.     SPECIMENS:  Was Obtained: gastric sub-mucosal thickening    HISTORY: The patient is a 54 y.o. year old male with history of above preop diagnosis.  I recommended esophagogastroduodenoscopy with possible biopsy and I explained the risk, benefits, expected outcome, and alternatives to the procedure.  Risks included but are not limited to bleeding, infection, respiratory distress, hypotension, and perforation of the esophagus, stomach, or duodenum.  Patient understands and is in agreement.      PROCEDURE: The patient was given IV conscious sedation.  The patient's SPO2 remained above 90% throughout the procedure.The gastroscope was inserted orally and advanced under direct vision through the esophagus, through the stomach, through the pylorus, and into the descending duodenum.      Post sedation note :The patient's SPO2 remained above 90% throughout the procedure.the vital signs remained stable , and no immediate complication form the procedure noted, patient will be ready for d/c when criteria is met .      Findings:    Retropharyngeal area was grossly normal appearing    Esophagus: normal    Esophagogastric markings: Diaphragmatic hiatus- 36 cm; GE junction- 36 cm; Squamo-columnar junction- 36 cm    Stomach:    Fundus: abnormal: thickened, erythematous gastric folds in distil part     Body: abnormal: thickened, erythematous gastric folds in proximal part in continuation with folds in distil fundus     Antrum: normal    Duodenum:     Descending: normal    Bulb:

## 2024-05-01 NOTE — CARE COORDINATION
TRANSITIONAL CARE PLANNING/ DISCHARGE ONGOING EVALUATION    Hospital Day: 2    Reason for Admission: GI bleed [K92.2]  Gastrointestinal hemorrhage, unspecified gastrointestinal hemorrhage type [K92.2]     Treatment Plan of Care: comfort    Tests/Procedures still needed: EUS    Barriers to Discharge: medical clearance and ability to return to shelter    Readmission Risk              Risk of Unplanned Readmission:  26            Patient goals/Treatment will need to obtain therapies for transitional needs, may need SNF placement vs return to [facility], if returns to [facility] will need transportation

## 2024-05-01 NOTE — PLAN OF CARE
Problem: Discharge Planning  Goal: Discharge to home or other facility with appropriate resources  Outcome: Progressing     Problem: Safety - Adult  Goal: Free from fall injury  Outcome: Progressing  Flowsheets (Taken 4/30/2024 2220)  Free From Fall Injury: Instruct family/caregiver on patient safety     Problem: ABCDS Injury Assessment  Goal: Absence of physical injury  Outcome: Progressing  Flowsheets (Taken 4/30/2024 2220)  Absence of Physical Injury: Implement safety measures based on patient assessment     Problem: Pain  Goal: Verbalizes/displays adequate comfort level or baseline comfort level  Outcome: Progressing

## 2024-05-01 NOTE — PROGRESS NOTES
INTENSIVE CARE UNIT  Resident Physician Progress Note    Patient - Angela Ghotra  Date of Admission -  4/27/2024  7:41 PM  Date of Evaluation -  5/1/2024  Room and Bed Number -  3027/3027-01   Hospital Day - 4      SUBJECTIVE:     OVERNIGHT EVENTS:      4/30/2024: Patient underwent second look EGD which showed small gastric varices with portal hypertensive gastropathy.  Apparently, he had another episode of hematemesis.  Ultrasound Doppler of the spleen was done to rule out splenic vein thrombosis.  GI is planning to do EUS to assess gastric varices.    Overnight: Patient had 1 single episode of hematemesis.  His hemoglobin was 6.6 and he received 1 unit PRBC.  H&H showed Hb 7.4.    TODAY:    5/1/2024:  -He is currently AOx4 for me.  Currently not on any sedation.  -He remained afebrile overnight.  He remained hemodynamically stable with systolic blood pressure in 140s and maintaining the MAP above 70 without pressor support.  -He is saturating well on room air.  -He is currently n.p.o. in anticipation of EUS by GI to evaluate gastric varices.  He continues to be on IV Protonix infusion and IV octreotide infusion.  -He had 1 episode of bloody bowel movement.  His Hb dropped to 6.6 after that episode.  He received 1 unit PRBC and repeat H&H shows Hb 7.4.  -His CMP today shows sodium 138, potassium 4.2, chloride 111, HCO3 21, BUN 27 and creatinine 1.8.  LFTs show albumin 2.3, alkaline phosphatase 25, ALT 9, AST 20 and total bilirubin 0.7.  -His CBC today shows WBC 10.4, hemoglobin 7.4, hematocrit 23.1 and platelet count of 91.  -He is currently not on any anticoagulation due to GI bleed.      AWAKE & FOLLOWING COMMANDS:  [] No   [x] Yes    SECRETIONS Amount:  [] Small [] Moderate  [] Large  [x] None  Color:     [] White [] Colored  [] Bloody    SEDATION:  RAAS Score:  [] Propofol gtt  [] Versed gtt  [] Ativan gtt   [x] No Sedation    PARALYZED:  [x] No    [] Yes    VASOPRESSORS:  [x] No    [] Yes  [] Levophed []

## 2024-05-01 NOTE — PLAN OF CARE
Problem: Discharge Planning  Goal: Discharge to home or other facility with appropriate resources  5/1/2024 0947 by Karina Lane RN  Outcome: Progressing  4/30/2024 2221 by Georgette Lewis RN  Outcome: Progressing     Problem: Safety - Adult  Goal: Free from fall injury  5/1/2024 0947 by Karina Lane RN  Outcome: Progressing  4/30/2024 2221 by Georgette Lewis RN  Outcome: Progressing  Flowsheets (Taken 4/30/2024 2220)  Free From Fall Injury: Instruct family/caregiver on patient safety     Problem: ABCDS Injury Assessment  Goal: Absence of physical injury  5/1/2024 0947 by Karina Lane RN  Outcome: Progressing  4/30/2024 2221 by Georgette Lewis RN  Outcome: Progressing  Flowsheets (Taken 4/30/2024 2220)  Absence of Physical Injury: Implement safety measures based on patient assessment     Problem: Pain  Goal: Verbalizes/displays adequate comfort level or baseline comfort level  5/1/2024 0947 by Karina Lane RN  Outcome: Progressing  4/30/2024 2221 by Georgette Lewis RN  Outcome: Progressing

## 2024-05-02 LAB
ALBUMIN SERPL-MCNC: 2.6 G/DL (ref 3.5–5.2)
ALBUMIN/GLOB SERPL: 2 {RATIO} (ref 1–2.5)
ALP SERPL-CCNC: 24 U/L (ref 40–129)
ALT SERPL-CCNC: 8 U/L (ref 10–50)
ANION GAP SERPL CALCULATED.3IONS-SCNC: 10 MMOL/L (ref 9–16)
ANION GAP SERPL CALCULATED.3IONS-SCNC: 11 MMOL/L (ref 9–16)
AST SERPL-CCNC: 21 U/L (ref 10–50)
BILIRUB SERPL-MCNC: 0.7 MG/DL (ref 0–1.2)
BUN SERPL-MCNC: 26 MG/DL (ref 6–20)
BUN SERPL-MCNC: 27 MG/DL (ref 6–20)
CALCIUM SERPL-MCNC: 7.7 MG/DL (ref 8.6–10.4)
CALCIUM SERPL-MCNC: 7.7 MG/DL (ref 8.6–10.4)
CHLORIDE SERPL-SCNC: 106 MMOL/L (ref 98–107)
CHLORIDE SERPL-SCNC: 107 MMOL/L (ref 98–107)
CO2 SERPL-SCNC: 19 MMOL/L (ref 20–31)
CO2 SERPL-SCNC: 19 MMOL/L (ref 20–31)
CREAT SERPL-MCNC: 1.8 MG/DL (ref 0.7–1.2)
CREAT SERPL-MCNC: 1.9 MG/DL (ref 0.7–1.2)
ERYTHROCYTE [DISTWIDTH] IN BLOOD BY AUTOMATED COUNT: 16.7 % (ref 11.8–14.4)
GFR, ESTIMATED: 42 ML/MIN/1.73M2
GFR, ESTIMATED: 43 ML/MIN/1.73M2
GLUCOSE BLD-MCNC: 120 MG/DL (ref 75–110)
GLUCOSE SERPL-MCNC: 70 MG/DL (ref 74–99)
GLUCOSE SERPL-MCNC: 81 MG/DL (ref 74–99)
HCT VFR BLD AUTO: 19.4 % (ref 40.7–50.3)
HCT VFR BLD AUTO: 23.1 % (ref 40.7–50.3)
HCT VFR BLD AUTO: 23.9 % (ref 40.7–50.3)
HCT VFR BLD AUTO: 24 % (ref 40.7–50.3)
HGB BLD-MCNC: 6.6 G/DL (ref 13–17)
HGB BLD-MCNC: 7.5 G/DL (ref 13–17)
HGB BLD-MCNC: 7.8 G/DL (ref 13–17)
HGB BLD-MCNC: 7.8 G/DL (ref 13–17)
LDH SERPL-CCNC: 168 U/L (ref 135–225)
MCH RBC QN AUTO: 28.4 PG (ref 25.2–33.5)
MCHC RBC AUTO-ENTMCNC: 31.4 G/DL (ref 28.4–34.8)
MCV RBC AUTO: 90.5 FL (ref 82.6–102.9)
NRBC BLD-RTO: 0.4 PER 100 WBC
PLATELET # BLD AUTO: 102 K/UL (ref 138–453)
PMV BLD AUTO: 11.2 FL (ref 8.1–13.5)
POTASSIUM SERPL-SCNC: 3.8 MMOL/L (ref 3.7–5.3)
POTASSIUM SERPL-SCNC: 3.8 MMOL/L (ref 3.7–5.3)
PROT SERPL-MCNC: 3.7 G/DL (ref 6.6–8.7)
RBC # BLD AUTO: 2.64 M/UL (ref 4.21–5.77)
SODIUM SERPL-SCNC: 135 MMOL/L (ref 136–145)
SODIUM SERPL-SCNC: 137 MMOL/L (ref 136–145)
WBC OTHER # BLD: 10.5 K/UL (ref 3.5–11.3)

## 2024-05-02 PROCEDURE — 36415 COLL VENOUS BLD VENIPUNCTURE: CPT

## 2024-05-02 PROCEDURE — 85018 HEMOGLOBIN: CPT

## 2024-05-02 PROCEDURE — 2580000003 HC RX 258: Performed by: INTERNAL MEDICINE

## 2024-05-02 PROCEDURE — 6370000000 HC RX 637 (ALT 250 FOR IP): Performed by: INTERNAL MEDICINE

## 2024-05-02 PROCEDURE — 2000000000 HC ICU R&B

## 2024-05-02 PROCEDURE — 85014 HEMATOCRIT: CPT

## 2024-05-02 PROCEDURE — 80048 BASIC METABOLIC PNL TOTAL CA: CPT

## 2024-05-02 PROCEDURE — P9016 RBC LEUKOCYTES REDUCED: HCPCS

## 2024-05-02 PROCEDURE — C9113 INJ PANTOPRAZOLE SODIUM, VIA: HCPCS | Performed by: INTERNAL MEDICINE

## 2024-05-02 PROCEDURE — 87338 HPYLORI STOOL AG IA: CPT

## 2024-05-02 PROCEDURE — 97161 PT EVAL LOW COMPLEX 20 MIN: CPT

## 2024-05-02 PROCEDURE — 6360000002 HC RX W HCPCS: Performed by: INTERNAL MEDICINE

## 2024-05-02 PROCEDURE — 85027 COMPLETE CBC AUTOMATED: CPT

## 2024-05-02 PROCEDURE — 80053 COMPREHEN METABOLIC PANEL: CPT

## 2024-05-02 PROCEDURE — 82947 ASSAY GLUCOSE BLOOD QUANT: CPT

## 2024-05-02 PROCEDURE — 99291 CRITICAL CARE FIRST HOUR: CPT | Performed by: INTERNAL MEDICINE

## 2024-05-02 PROCEDURE — 36430 TRANSFUSION BLD/BLD COMPNT: CPT

## 2024-05-02 PROCEDURE — 97535 SELF CARE MNGMENT TRAINING: CPT

## 2024-05-02 PROCEDURE — 97165 OT EVAL LOW COMPLEX 30 MIN: CPT

## 2024-05-02 PROCEDURE — 83615 LACTATE (LD) (LDH) ENZYME: CPT

## 2024-05-02 PROCEDURE — 99232 SBSQ HOSP IP/OBS MODERATE 35: CPT | Performed by: INTERNAL MEDICINE

## 2024-05-02 PROCEDURE — 97530 THERAPEUTIC ACTIVITIES: CPT

## 2024-05-02 RX ORDER — SODIUM CHLORIDE 9 MG/ML
INJECTION, SOLUTION INTRAVENOUS PRN
Status: DISCONTINUED | OUTPATIENT
Start: 2024-05-02 | End: 2024-05-03

## 2024-05-02 RX ADMIN — PANTOPRAZOLE SODIUM 8 MG/HR: 40 INJECTION, POWDER, FOR SOLUTION INTRAVENOUS at 09:12

## 2024-05-02 RX ADMIN — SODIUM CHLORIDE, POTASSIUM CHLORIDE, SODIUM LACTATE AND CALCIUM CHLORIDE: 600; 310; 30; 20 INJECTION, SOLUTION INTRAVENOUS at 00:01

## 2024-05-02 RX ADMIN — SODIUM CHLORIDE, POTASSIUM CHLORIDE, SODIUM LACTATE AND CALCIUM CHLORIDE: 600; 310; 30; 20 INJECTION, SOLUTION INTRAVENOUS at 10:05

## 2024-05-02 RX ADMIN — Medication 1 TABLET: at 07:49

## 2024-05-02 RX ADMIN — SODIUM CHLORIDE, PRESERVATIVE FREE 10 ML: 5 INJECTION INTRAVENOUS at 20:04

## 2024-05-02 RX ADMIN — SUCRALFATE 1 G: 1 TABLET ORAL at 18:24

## 2024-05-02 RX ADMIN — Medication 100 MG: at 07:49

## 2024-05-02 RX ADMIN — FOLIC ACID 1 MG: 1 TABLET ORAL at 07:49

## 2024-05-02 RX ADMIN — OCTREOTIDE ACETATE 50 MCG/HR: 500 INJECTION, SOLUTION INTRAVENOUS; SUBCUTANEOUS at 09:12

## 2024-05-02 RX ADMIN — SUCRALFATE 1 G: 1 TABLET ORAL at 07:51

## 2024-05-02 RX ADMIN — SUCRALFATE 1 G: 1 TABLET ORAL at 13:00

## 2024-05-02 RX ADMIN — PANTOPRAZOLE SODIUM 8 MG/HR: 40 INJECTION, POWDER, FOR SOLUTION INTRAVENOUS at 22:44

## 2024-05-02 RX ADMIN — SODIUM CHLORIDE, PRESERVATIVE FREE 10 ML: 5 INJECTION INTRAVENOUS at 07:50

## 2024-05-02 RX ADMIN — METOPROLOL SUCCINATE 25 MG: 25 TABLET, EXTENDED RELEASE ORAL at 07:49

## 2024-05-02 RX ADMIN — ATORVASTATIN CALCIUM 40 MG: 40 TABLET, FILM COATED ORAL at 20:04

## 2024-05-02 RX ADMIN — SUCRALFATE 1 G: 1 TABLET ORAL at 20:04

## 2024-05-02 ASSESSMENT — PAIN SCALES - GENERAL
PAINLEVEL_OUTOF10: 0
PAINLEVEL_OUTOF10: 0

## 2024-05-02 NOTE — PROGRESS NOTES
Occupational Therapy  Facility/Department[facility]  Occupational Therapy Initial Assessment    Name: Angela Ghotra  : 1970  MRN: [id number]  Date of Service: 2024  Chief Complaint   Patient presents with    Hematemesis    Rectal Bleeding       Discharge Recommendations:   No therapy recommended at discharge.  OT Equipment Recommendations  Equipment Needed: No       Patient Diagnosis(es): The primary encounter diagnosis was Gastrointestinal hemorrhage, unspecified gastrointestinal hemorrhage type. A diagnosis of Gastric varices was also pertinent to this visit.  Past Medical History:  has a past medical history of Alcohol use, Blood loss anemia, CKD (chronic kidney disease), Gastritis, and Hypertension.  Past Surgical History:  has a past surgical history that includes back surgery; Esophagogastroduodenoscopy (2024); Upper gastrointestinal endoscopy (N/A, 2024); Esophagogastroduodenoscopy (2024); Upper gastrointestinal endoscopy (N/A, 2024); Esophagogastroduodenoscopy; Upper gastrointestinal endoscopy (N/A, 2024); and endoscopic ultrasound (upper) (2024).           Assessment   Assessment: No acute OT deficits noted this date. Pt in agreeable to discharge from acute OT services. Please reorder OT if changes arise.  Prognosis: Good  Decision Making: Low Complexity  No Skilled OT: Independent with functional mobility;Independent with ADL's;No OT goals identified  REQUIRES OT FOLLOW-UP: No  Activity Tolerance  Activity Tolerance: Patient Tolerated treatment well        Restrictions  Restrictions/Precautions  Restrictions/Precautions: General Precautions  Required Braces or Orthoses?: No    Subjective   General  Patient assessed for rehabilitation services?: Yes  Family / Caregiver Present: No  General Comment  Comments: RN ok'd OT eval this date. Pt pleasant, cooperative, and agreeable to therapy throughout entire session. Pt reporting no pain during session this date;  engaging in functional activities and repositioned at end of session.     Social/Functional History  Social/Functional History  Lives With: Alone  Type of Home:  (shelter)  Home Layout: One level  Home Access: Level entry  Bathroom Shower/Tub: Walk-in shower  Bathroom Toilet: Standard  Bathroom Equipment: Grab bars in shower, Grab bars around toilet  Home Equipment: None (no DME at baseline)  Receives Help From: Family  ADL Assistance: Independent  Homemaking Assistance: Independent  Ambulation Assistance: Independent  Transfer Assistance: Independent  Active : No  Patient's  Info: bus  Occupation: Unemployed  Leisure & Hobbies: [address]   Safety Devices  Type of Devices: All fall risk precautions in place;Call light within reach;Gait belt;Nurse notified;Left in bed  Restraints  Restraints Initially in Place: No  Bed Mobility Training  Bed Mobility Training: Yes  Overall Level of Assistance: Independent  Supine to Sit: Independent  Sit to Supine: Independent  Balance  Sitting: Intact (static/dynamic sitting during functional activities independently. No seated balance deficits noted this date.)  Standing: Intact (static/dynamic standing during functional activities independently without use of device. No standing balance deficits noted this date.)  Transfer Training  Transfer Training: Yes  Overall Level of Assistance: Independent (without use of device)  Sit to Stand: Independent  Stand to Sit: Independent  Gait  Gait Training: Yes  Overall Level of Assistance: Independent (Pt engaging in functional mobility household distances Independently without use of device)     AROM: Within functional limits  Strength: Within functional limits (BUE's grossly 5/5)  Coordination: Within functional limits  Tone: Normal  Sensation: Intact (Pt reporting no acute/chronic numbness or tingling this date)  ADL  Feeding: Independent  Grooming: Independent  UE Bathing: Independent  LE Bathing: Independent  UE  Dressing: Independent  LE Dressing: Independent  LE Dressing Skilled Clinical Factors: Pt sat EOB donning B socks independently.  Toileting: Independent     Activity Tolerance  Activity Tolerance: Patient tolerated treatment well        Vision  Vision: Within Functional Limits  Hearing  Hearing: Within functional limits  Cognition  Overall Cognitive Status: WFL  Orientation  Overall Orientation Status: Within Functional Limits                  Education Given To: Patient  Education Provided: Role of Therapy;Plan of Care;ADL Adaptive Strategies;Transfer Training;Energy Conservation;Fall Prevention Strategies  Education Method: Demonstration;Verbal  Barriers to Learning: None  Education Outcome: Verbalized understanding;Demonstrated understanding           Hand Dominance  Hand Dominance: Right       AM-PAC - ADL  AM-PAC Daily Activity - Inpatient   How much help is needed for putting on and taking off regular lower body clothing?: None  How much help is needed for bathing (which includes washing, rinsing, drying)?: None  How much help is needed for toileting (which includes using toilet, bedpan, or urinal)?: None  How much help is needed for putting on and taking off regular upper body clothing?: None  How much help is needed for taking care of personal grooming?: None  How much help for eating meals?: None  AM-PAC Inpatient Daily Activity Raw Score: 24  AM-PAC Inpatient ADL T-Scale Score : 57.54  ADL Inpatient CMS 0-100% Score: 0  ADL Inpatient CMS G-Code Modifier : CH         Goals  Short Term Goals  Time Frame for Short Term Goals: Pt currently independent no acute OT goals identified.       Therapy Time   Individual Concurrent Group Co-treatment   Time In 0840         Time Out 0857         Minutes 17         Timed Code Treatment Minutes: 8 Minutes (co-eval w/PT)       MALORIE Reyes/GILMAR

## 2024-05-02 NOTE — CARE COORDINATION
Spoke with the patient and the  at [facility] on speaker phone. She states the facility has a 3 day bed hold policy for hospitalization. Because the patient has exceeded this timeframe, he will lose his bed. Writer provided a fax number for the , [facility] ([phone] = [facility]'s phone number) to fax an appeal document. The patient must complete and have faxed back. If the appeal is won, he will be placed at the top of the list for the next bed available.  was unable to provide estimate for when a bed would be available.    [id number] Writer gave the patient the faxed appeal form. Patient to have the document returned to the writer to fax back to [facility].    7937 Form completed and faxed to hospitals [phone]. Original returned to patient. States he may be able to stay with his children's mother until a bed becomes available.

## 2024-05-02 NOTE — PROGRESS NOTES
Physical Therapy  Facility/Department: [facility]  Physical Therapy Initial Assessment    Name: Angela Ghotra  : 1970  MRN: [id number]  Date of Service: 2024    Discharge Recommendations: No therapy recommended at discharge.    Chief Complaint   Patient presents with    Hematemesis    Rectal Bleeding     Angela Ghotra is a 54 y.o. PMH of Ckd, HTN, alcohol use disorder, tobacco use disorder, gastric varices, portal hypertension presented to the ED due to episodes of hematemesis and melena. While in the ED patient had additional episode of hematemesis.       PT Equipment Recommendations  Equipment Needed: No      Patient Diagnosis(es): The primary encounter diagnosis was Gastrointestinal hemorrhage, unspecified gastrointestinal hemorrhage type. A diagnosis of Gastric varices was also pertinent to this visit.  Past Medical History:  has a past medical history of Alcohol use, Blood loss anemia, CKD (chronic kidney disease), Gastritis, and Hypertension.  Past Surgical History:  has a past surgical history that includes back surgery; Esophagogastroduodenoscopy (2024); Upper gastrointestinal endoscopy (N/A, 2024); Esophagogastroduodenoscopy (2024); Upper gastrointestinal endoscopy (N/A, 2024); Esophagogastroduodenoscopy; Upper gastrointestinal endoscopy (N/A, 2024); and endoscopic ultrasound (upper) (2024).    Assessment   Assessment: Pt ambulates 500 ft with no AD independently. Pt should be safe to return to prior living situation with intermittent support as needed. pt educated on d/c PT, agreeable.  Therapy Prognosis: Good  Decision Making: Low Complexity  Barriers to Learning: none  Requires PT Follow-Up: No  Activity Tolerance  Activity Tolerance: Patient tolerated treatment well     Plan   Physical Therapy Plan  General Plan:  (d/c PT)  Safety Devices  Type of Devices: All fall risk precautions in place, Call light within reach, Gait belt, Nurse notified, Left in

## 2024-05-02 NOTE — PROGRESS NOTES
INTENSIVE CARE UNIT  Resident Physician Progress Note    Patient - Angela Ghotra  Date of Admission -  4/27/2024  7:41 PM  Date of Evaluation -  5/2/2024  Room and Bed Number -  3027/3027-01   Hospital Day - 5      SUBJECTIVE:     OVERNIGHT EVENTS:      4/30/2024: Patient underwent second look EGD which showed small gastric varices with portal hypertensive gastropathy.  Apparently, he had another episode of hematemesis.  Ultrasound Doppler of the spleen was done to rule out splenic vein thrombosis.  GI is planning to do EUS to assess gastric varices.    TODAY:  -No acute events overnight  -Neurologically intact.  On multivitamin, thiamine, and folic acid for alcohol use disorder  -Hemodynamically stable, on Toprol-XL 25.Denies any lightheadedness, dizziness, headaches, or abdominal pain.  -Maintaining oxygen saturations on room air.  -Bm x2 overnight. Reports small drops of blood post-EUS yesterday but otherwise no melena or blood per rectum.  On carafate, Protonix gtt and octreotide gtt for hematemesis and melena. LFTs unremarkable  -EGD/EUS yesterday showed focal thickening of the gastric submucosa.  Biopsies obtained to rule out lymphoma.  No gastric varices present   Previous EGD on 4/8 showed 1gastric varix with edema in the gastric fundus and portal hypertensive gastropathy   Splenic ultrasound unremarkable. No liver cirrhosis, mildly dilated pancreatic duct on ultrasound  -Tolerating clear liquid diet.  UOP 1.8 L/24 H --> net +10 L since admission  -On LR at 100 mL/h  -BUN 26/creatinine 1.8  -Bicarb 19  -WBC 10.5, not on antibiotics  -Hb 7.5 s/p 1 unit PRBC  -Blood glucose 70  -DVT prophylaxis: Pneumatic compression devices.  Not on anticoagulation due to GI bleed.    Plan:   -transfer to step down  -Diet per GI, follow up GI recommendations  -H+H q8h    AWAKE & FOLLOWING COMMANDS:  [] No   [x] Yes    SECRETIONS Amount:  [] Small [] Moderate  [] Large  [x] None  Color:     [] White [] Colored  []  and likely associated fluid   about the posterolateral spleen.  The nearby colon also appears mildly   edematous, favored to be secondary to the pancreatic inflammatory change   versus primary colitis/diverticulitis.  Recommend CT follow-up in 3 months to   ensure resolution.         XR CHEST PORTABLE   Final Result   No acute process.              ASSESSMENT:     Patient Active Problem List    Diagnosis Date Noted    Chest pain 02/26/2023    Hematemesis 04/28/2024    Melena 04/28/2024    Gastritis 04/28/2024    Epigastric pain 04/28/2024    Blood loss anemia 04/28/2024    Elevated troponin 04/28/2024    Hypokalemia 04/28/2024    CKD (chronic kidney disease) 04/28/2024    Alcohol use 04/08/2024    Schatzki's ring 04/08/2024    NSTEMI (non-ST elevated myocardial infarction) (HCC) 04/05/2024    GI bleed 04/04/2024          PLAN:     WEAN PER PROTOCOL:  [] No   [] Yes  [x] N/A    ICU PROPHYLAXIS:  Stress ulcer:  [] PPI Agent  [x] [id number] [] Sucralfate  [] Other:  VTE:   [] Enoxaparin  [] Unfract. Heparin Subcut  [] EPC Cuffs    NUTRITION:  [] NPO [] Tube Feeding (Specify: ) [] TPN  [x] PO    HOME MEDS RECONCILED: [] No  [x] Yes    CONSULTATION NEEDED:  [] No  [x] Yes    FAMILY UPDATED:    [] No  [x] Yes    TRANSFER OUT OF ICU:   [] No  [] Yes        Plan:    Neuro:  AOX4   Neuro checks per protocol   Hx of alcoholism   CIWA protocol   Monitor for withdrawal   Folic acid and thiamine      Resp:  Good O2 saturation on RA  CXR: wnl      CV:  Normotensive  On Toprol XL 25 mg OD   Elevated troponin likely Type II NSTEMI (Demand ischemia)   EKG was non ischemic      GI/Nutrition:  #Upper GI bleed from gastric varices  GI consulted, performed EGD on 4/28/2024 which showed gastric varices and portal hypertensive gastropathy and no active bleeding.  GI was unable to visualize bleeding, there was a large amount of blood and blood clots in the stomach   S/p 4 units prbc   On protonix and octreotide drip  4/30 - Repeat  EGD showed  gastric varices grade 1.  GI ordered splenic Doppler to rule out splenic vein thrombosis.  It showed patent splenic vein.  Plan for EUS today to evaluate gastric varices.     /Fluids/Electrolytes:  MIVF: LR@ 100ml/hr   Monitor I/Os   Daily BMPs   Replace electrolytes prn     Heme:  #Acute on chronic anemia due to upper GI bleed  Hgb of 6 on admission   Received 3u prbc   Most recent hgb was 7.4     ID:  WBC downtrending - likely reactive  Afebrile  Cultures no growth yet   Monitor for infection       Prophylaxis:  DVT: None as patient has GI bleed   GI: Protonix infusion     Dispo:  Monitor in ICU       Ofe Shukla MD   Internal Medicine PGY1  5/2/2024 8:09 AM       Attending Physician Statement  I have discussed the care of Angela Ghotra, including pertinent history and exam findings with the resident. I have reviewed the key elements of all parts of the encounter with the resident. I have seen and examined the patient with the resident.  I agree with the assessment and plan and status of the problem list as documented.    Overnight patient did not have bleeding per rectum did not have hematemesis.  Hemodynamically stable.  He had a repeat endoscopy done and with EUS this time biopsy was done per GI consent for gastric wall thickening according to the note no gastric varices.  Biopsy was done with a EUS.  Patient drop his hemoglobin again and had received 2 unit packed RBC in the evening his hemoglobin was 6.7.  His hemoglobin is 6.6 this morning and he is getting another unit of packed RBC.  No abdominal pain is present..  Will continue to follow-up with GI as bleeding source is likely upper GI.  Continue with PPI drip and also octreotide drip.    Discussed with nursing staff, treatment and plan discussed.      Total critical care time caring for this patient with life threatening, unstable organ failure, including direct patient contact, management of life support systems, review of data including imaging and  labs, discussions with other team members and physicians at least 35 min so far today, excluding procedures.       Please note that this chart was generated using voice recognition Dragon dictation software. Although every effort was made to ensure the accuracy of this automated transcription, some errors in transcription may have occurred.     Benjy Abdi MD  5/2/2024 1:54 PM

## 2024-05-02 NOTE — PROGRESS NOTES
[facility]enterology Progress Note    Angela Ghotra is a 54 y.o. male patient.  Hospitalization Day:5      Chief consult reason: GI bleed      Subjective: Patient seen and examined.  Patient reports having a bowel movement with 2 clots this AM that were maroon in color.  He has had none since.  Currently tolerating clear liquid diet      Objective:   VITALS:  BP (!) 141/73   Pulse 78   Temp 98.1 °F (36.7 °C) (Axillary)   Resp 17   Ht 1.702 m (5' 7\")   Wt 61.2 kg (135 lb)   SpO2 96%   BMI 21.14 kg/m²   TEMPERATURE:  Current - Temp: 98.1 °F (36.7 °C); Max - Temp  Av.1 °F (36.7 °C)  Min: 97.2 °F (36.2 °C)  Max: 98.6 °F (37 °C)    Physical Assessment:  General appearance:  alert, cooperative and no distress  Mental Status:  oriented to person, place and time and normal affect  Lungs:  clear to auscultation bilaterally, normal effort  Heart:  regular rate and rhythm, no murmur  Abdomen:  soft, nontender, nondistended, normal bowel sounds  Extremities:  no edema, no redness, No clubbing  Skin:  warm, dry, no gross lesions or rashes    CURRENT MEDICATIONS:  Scheduled Meds:   atorvastatin  40 mg Oral Nightly    folic acid  1 mg Oral Daily    metoprolol succinate  25 mg Oral Daily    therapeutic multivitamin-minerals  1 tablet Oral Daily    sucralfate  1 g Oral 4x Daily    thiamine  100 mg Oral Daily    sodium chloride flush  5-40 mL IntraVENous 2 times per day     Continuous Infusions:   pantoprazole 8 mg/hr (24)    sodium chloride      lactated ringers IV soln 100 mL/hr at 24    sodium chloride      sodium chloride      sodium chloride      octreotide (SANDOSTATIN) 500 mcg in sodium chloride 0.9 % 100 mL infusion 50 mcg/hr (24)     PRN Meds:sodium chloride, sodium chloride, sodium chloride, sodium chloride flush, sodium chloride, ondansetron **OR** ondansetron, acetaminophen **OR** acetaminophen      Data Review:  LABS and IMAGING:     CBC  Recent Labs

## 2024-05-02 NOTE — PLAN OF CARE
Problem: Discharge Planning  Goal: Discharge to home or other facility with appropriate resources  5/1/2024 2103 by Dany Mcguire RN  Outcome: Progressing  5/1/2024 0947 by Karina Lane RN  Outcome: Progressing     Problem: Safety - Adult  Goal: Free from fall injury  5/1/2024 2103 by Dany Mcguire RN  Outcome: Progressing  5/1/2024 0947 by Karina Lane RN  Outcome: Progressing     Problem: ABCDS Injury Assessment  Goal: Absence of physical injury  5/1/2024 2103 by Dany Mcguire RN  Outcome: Progressing  5/1/2024 0947 by Karina Lane RN  Outcome: Progressing     Problem: Pain  Goal: Verbalizes/displays adequate comfort level or baseline comfort level  5/1/2024 2103 by Dany Mcguire RN  Outcome: Progressing  5/1/2024 0947 by Karina Lane RN  Outcome: Progressing

## 2024-05-02 NOTE — PLAN OF CARE
Problem: Discharge Planning  Goal: Discharge to home or other facility with appropriate resources  5/2/2024 0935 by Karina Lane RN  Outcome: Progressing  5/1/2024 2103 by Dany Mcguire RN  Outcome: Progressing     Problem: Safety - Adult  Goal: Free from fall injury  5/2/2024 0935 by Karina Lane RN  Outcome: Progressing  5/1/2024 2103 by Dany Mcguire RN  Outcome: Progressing     Problem: ABCDS Injury Assessment  Goal: Absence of physical injury  5/2/2024 0935 by Karina Lane RN  Outcome: Progressing  5/1/2024 2103 by Dany Mcguire RN  Outcome: Progressing     Problem: Pain  Goal: Verbalizes/displays adequate comfort level or baseline comfort level  5/2/2024 0935 by Karina Lane RN  Outcome: Progressing  5/1/2024 2103 by Dany Mcguire RN  Outcome: Progressing

## 2024-05-02 NOTE — ANESTHESIA POSTPROCEDURE EVALUATION
Department of Anesthesiology  Postprocedure Note    Patient: Angela Ghotra  MRN: [id number]  YOB: 1970  Date of evaluation: 5/2/2024    Procedure Summary       Date: 05/01/24 Room / Location: [address]    Anesthesia Start: 1824 Anesthesia Stop: 1908    Procedure: ESOPHAGOGASTRODUODENOSCOPY ENDOSCOPIC ULTRASOUND FINE NEEDLE ASPIRATION Diagnosis:       Gastric varices      (Gastric varices [I86.4])    Surgeons: Troy Rubio MD Responsible Provider: Juan Fraga MD    Anesthesia Type: MAC ASA Status: 3            Anesthesia Type: No value filed.    Brennon Phase I: Brennon Score: 10    Brennon Phase II:      Anesthesia Post Evaluation    Patient location during evaluation: PACU  Patient participation: complete - patient participated  Level of consciousness: awake and alert  Airway patency: patent  Nausea & Vomiting: no nausea and no vomiting  Cardiovascular status: blood pressure returned to baseline  Hydration status: euvolemic  Pain management: adequate    No notable events documented.

## 2024-05-03 ENCOUNTER — APPOINTMENT (OUTPATIENT)
Age: 54
DRG: 377 | End: 2024-05-03
Payer: COMMERCIAL

## 2024-05-03 ENCOUNTER — ANESTHESIA EVENT (OUTPATIENT)
Dept: OPERATING ROOM | Age: 54
End: 2024-05-03
Payer: COMMERCIAL

## 2024-05-03 ENCOUNTER — ANESTHESIA (OUTPATIENT)
Dept: OPERATING ROOM | Age: 54
End: 2024-05-03
Payer: COMMERCIAL

## 2024-05-03 LAB
ANION GAP SERPL CALCULATED.3IONS-SCNC: 7 MMOL/L (ref 9–16)
BUN SERPL-MCNC: 24 MG/DL (ref 6–20)
CALCIUM SERPL-MCNC: 7.3 MG/DL (ref 8.6–10.4)
CHLORIDE SERPL-SCNC: 107 MMOL/L (ref 98–107)
CO2 SERPL-SCNC: 21 MMOL/L (ref 20–31)
CREAT SERPL-MCNC: 1.8 MG/DL (ref 0.7–1.2)
ERYTHROCYTE [DISTWIDTH] IN BLOOD BY AUTOMATED COUNT: 15.7 % (ref 11.8–14.4)
GFR, ESTIMATED: 43 ML/MIN/1.73M2
GLUCOSE BLD-MCNC: 113 MG/DL (ref 75–110)
GLUCOSE BLD-MCNC: 56 MG/DL (ref 75–110)
GLUCOSE SERPL-MCNC: 111 MG/DL (ref 74–99)
HCT VFR BLD AUTO: 17.8 % (ref 40.7–50.3)
HCT VFR BLD AUTO: 19.9 % (ref 40.7–50.3)
HCT VFR BLD AUTO: 24.9 % (ref 40.7–50.3)
HGB BLD-MCNC: 5.7 G/DL (ref 13–17)
HGB BLD-MCNC: 6.5 G/DL (ref 13–17)
HGB BLD-MCNC: 8.2 G/DL (ref 13–17)
MCH RBC QN AUTO: 28.8 PG (ref 25.2–33.5)
MCHC RBC AUTO-ENTMCNC: 32.7 G/DL (ref 28.4–34.8)
MCV RBC AUTO: 88.1 FL (ref 82.6–102.9)
MICROORGANISM/AGENT SPEC: NEGATIVE
NRBC BLD-RTO: 0.3 PER 100 WBC
PLATELET # BLD AUTO: 104 K/UL (ref 138–453)
PMV BLD AUTO: 11.2 FL (ref 8.1–13.5)
POTASSIUM SERPL-SCNC: 3.7 MMOL/L (ref 3.7–5.3)
RBC # BLD AUTO: 2.26 M/UL (ref 4.21–5.77)
SODIUM SERPL-SCNC: 135 MMOL/L (ref 136–145)
SPECIMEN DESCRIPTION: NORMAL
WBC OTHER # BLD: 10.3 K/UL (ref 3.5–11.3)

## 2024-05-03 PROCEDURE — 85027 COMPLETE CBC AUTOMATED: CPT

## 2024-05-03 PROCEDURE — 2580000003 HC RX 258

## 2024-05-03 PROCEDURE — 0DB98ZX EXCISION OF DUODENUM, VIA NATURAL OR ARTIFICIAL OPENING ENDOSCOPIC, DIAGNOSTIC: ICD-10-PCS | Performed by: INTERNAL MEDICINE

## 2024-05-03 PROCEDURE — 6360000002 HC RX W HCPCS: Performed by: INTERNAL MEDICINE

## 2024-05-03 PROCEDURE — 74174 CTA ABD&PLVS W/CONTRAST: CPT

## 2024-05-03 PROCEDURE — P9073 PLATELETS PHERESIS PATH REDU: HCPCS

## 2024-05-03 PROCEDURE — P9017 PLASMA 1 DONOR FRZ W/IN 8 HR: HCPCS

## 2024-05-03 PROCEDURE — 88305 TISSUE EXAM BY PATHOLOGIST: CPT

## 2024-05-03 PROCEDURE — 3700000001 HC ADD 15 MINUTES (ANESTHESIA): Performed by: INTERNAL MEDICINE

## 2024-05-03 PROCEDURE — 88173 CYTOPATH EVAL FNA REPORT: CPT

## 2024-05-03 PROCEDURE — C1052 HEMOSTATIC AGENT, GI, TOPIC: HCPCS | Performed by: INTERNAL MEDICINE

## 2024-05-03 PROCEDURE — 0DB78ZX EXCISION OF STOMACH, PYLORUS, VIA NATURAL OR ARTIFICIAL OPENING ENDOSCOPIC, DIAGNOSTIC: ICD-10-PCS | Performed by: INTERNAL MEDICINE

## 2024-05-03 PROCEDURE — 43242 EGD US FINE NEEDLE BX/ASPIR: CPT | Performed by: INTERNAL MEDICINE

## 2024-05-03 PROCEDURE — 99232 SBSQ HOSP IP/OBS MODERATE 35: CPT | Performed by: INTERNAL MEDICINE

## 2024-05-03 PROCEDURE — 43255 EGD CONTROL BLEEDING ANY: CPT | Performed by: INTERNAL MEDICINE

## 2024-05-03 PROCEDURE — 6370000000 HC RX 637 (ALT 250 FOR IP): Performed by: INTERNAL MEDICINE

## 2024-05-03 PROCEDURE — 3609012400 HC EGD TRANSORAL BIOPSY SINGLE/MULTIPLE: Performed by: INTERNAL MEDICINE

## 2024-05-03 PROCEDURE — 85018 HEMOGLOBIN: CPT

## 2024-05-03 PROCEDURE — 2580000003 HC RX 258: Performed by: INTERNAL MEDICINE

## 2024-05-03 PROCEDURE — 85014 HEMATOCRIT: CPT

## 2024-05-03 PROCEDURE — 36430 TRANSFUSION BLD/BLD COMPNT: CPT

## 2024-05-03 PROCEDURE — 36415 COLL VENOUS BLD VENIPUNCTURE: CPT

## 2024-05-03 PROCEDURE — C9113 INJ PANTOPRAZOLE SODIUM, VIA: HCPCS | Performed by: INTERNAL MEDICINE

## 2024-05-03 PROCEDURE — 6360000002 HC RX W HCPCS

## 2024-05-03 PROCEDURE — 0W3P8ZZ CONTROL BLEEDING IN GASTROINTESTINAL TRACT, VIA NATURAL OR ARTIFICIAL OPENING ENDOSCOPIC: ICD-10-PCS | Performed by: INTERNAL MEDICINE

## 2024-05-03 PROCEDURE — 6360000004 HC RX CONTRAST MEDICATION

## 2024-05-03 PROCEDURE — 30233L1 TRANSFUSION OF NONAUTOLOGOUS FRESH PLASMA INTO PERIPHERAL VEIN, PERCUTANEOUS APPROACH: ICD-10-PCS | Performed by: INTERNAL MEDICINE

## 2024-05-03 PROCEDURE — 86927 PLASMA FRESH FROZEN: CPT

## 2024-05-03 PROCEDURE — 80048 BASIC METABOLIC PNL TOTAL CA: CPT

## 2024-05-03 PROCEDURE — 2709999900 HC NON-CHARGEABLE SUPPLY: Performed by: INTERNAL MEDICINE

## 2024-05-03 PROCEDURE — 3609013000 HC EGD TRANSORAL CONTROL BLEEDING ANY METHOD: Performed by: INTERNAL MEDICINE

## 2024-05-03 PROCEDURE — 99291 CRITICAL CARE FIRST HOUR: CPT | Performed by: INTERNAL MEDICINE

## 2024-05-03 PROCEDURE — 0DB68ZX EXCISION OF STOMACH, VIA NATURAL OR ARTIFICIAL OPENING ENDOSCOPIC, DIAGNOSTIC: ICD-10-PCS | Performed by: INTERNAL MEDICINE

## 2024-05-03 PROCEDURE — 3609020800 HC EGD W/EUS FNA: Performed by: INTERNAL MEDICINE

## 2024-05-03 PROCEDURE — 82947 ASSAY GLUCOSE BLOOD QUANT: CPT

## 2024-05-03 PROCEDURE — 2000000000 HC ICU R&B

## 2024-05-03 PROCEDURE — 2720000010 HC SURG SUPPLY STERILE: Performed by: INTERNAL MEDICINE

## 2024-05-03 PROCEDURE — 43239 EGD BIOPSY SINGLE/MULTIPLE: CPT | Performed by: INTERNAL MEDICINE

## 2024-05-03 PROCEDURE — 7100000001 HC PACU RECOVERY - ADDTL 15 MIN: Performed by: INTERNAL MEDICINE

## 2024-05-03 PROCEDURE — P9016 RBC LEUKOCYTES REDUCED: HCPCS

## 2024-05-03 PROCEDURE — 3700000000 HC ANESTHESIA ATTENDED CARE: Performed by: INTERNAL MEDICINE

## 2024-05-03 PROCEDURE — 7100000000 HC PACU RECOVERY - FIRST 15 MIN: Performed by: INTERNAL MEDICINE

## 2024-05-03 PROCEDURE — 94761 N-INVAS EAR/PLS OXIMETRY MLT: CPT

## 2024-05-03 RX ORDER — DEXTROSE MONOHYDRATE 100 MG/ML
INJECTION, SOLUTION INTRAVENOUS CONTINUOUS PRN
Status: DISCONTINUED | OUTPATIENT
Start: 2024-05-03 | End: 2024-05-10 | Stop reason: HOSPADM

## 2024-05-03 RX ORDER — GLUCAGON 1 MG/ML
1 KIT INJECTION PRN
Status: DISCONTINUED | OUTPATIENT
Start: 2024-05-03 | End: 2024-05-10 | Stop reason: HOSPADM

## 2024-05-03 RX ORDER — MIDAZOLAM HYDROCHLORIDE 1 MG/ML
INJECTION INTRAMUSCULAR; INTRAVENOUS PRN
Status: DISCONTINUED | OUTPATIENT
Start: 2024-05-03 | End: 2024-05-03 | Stop reason: SDUPTHER

## 2024-05-03 RX ORDER — NALOXONE HYDROCHLORIDE 0.4 MG/ML
INJECTION, SOLUTION INTRAMUSCULAR; INTRAVENOUS; SUBCUTANEOUS PRN
Status: DISCONTINUED | OUTPATIENT
Start: 2024-05-03 | End: 2024-05-03

## 2024-05-03 RX ORDER — SODIUM CHLORIDE 9 MG/ML
INJECTION, SOLUTION INTRAVENOUS PRN
Status: DISCONTINUED | OUTPATIENT
Start: 2024-05-03 | End: 2024-05-05

## 2024-05-03 RX ORDER — SODIUM CHLORIDE 0.9 % (FLUSH) 0.9 %
5-40 SYRINGE (ML) INJECTION EVERY 12 HOURS SCHEDULED
Status: DISCONTINUED | OUTPATIENT
Start: 2024-05-03 | End: 2024-05-03

## 2024-05-03 RX ORDER — SODIUM CHLORIDE 9 MG/ML
INJECTION, SOLUTION INTRAVENOUS PRN
Status: DISCONTINUED | OUTPATIENT
Start: 2024-05-03 | End: 2024-05-03

## 2024-05-03 RX ORDER — SODIUM CHLORIDE 0.9 % (FLUSH) 0.9 %
5-40 SYRINGE (ML) INJECTION PRN
Status: DISCONTINUED | OUTPATIENT
Start: 2024-05-03 | End: 2024-05-03

## 2024-05-03 RX ORDER — GLYCOPYRROLATE 1 MG/5 ML
SYRINGE (ML) INTRAVENOUS PRN
Status: DISCONTINUED | OUTPATIENT
Start: 2024-05-03 | End: 2024-05-03 | Stop reason: SDUPTHER

## 2024-05-03 RX ORDER — PROPOFOL 10 MG/ML
INJECTION, EMULSION INTRAVENOUS CONTINUOUS PRN
Status: DISCONTINUED | OUTPATIENT
Start: 2024-05-03 | End: 2024-05-03 | Stop reason: SDUPTHER

## 2024-05-03 RX ORDER — FENTANYL CITRATE 50 UG/ML
INJECTION, SOLUTION INTRAMUSCULAR; INTRAVENOUS PRN
Status: DISCONTINUED | OUTPATIENT
Start: 2024-05-03 | End: 2024-05-03 | Stop reason: SDUPTHER

## 2024-05-03 RX ORDER — SODIUM CHLORIDE, SODIUM LACTATE, POTASSIUM CHLORIDE, CALCIUM CHLORIDE 600; 310; 30; 20 MG/100ML; MG/100ML; MG/100ML; MG/100ML
INJECTION, SOLUTION INTRAVENOUS CONTINUOUS PRN
Status: DISCONTINUED | OUTPATIENT
Start: 2024-05-03 | End: 2024-05-03 | Stop reason: SDUPTHER

## 2024-05-03 RX ADMIN — DEXTROSE MONOHYDRATE 125 ML: 100 INJECTION, SOLUTION INTRAVENOUS at 21:01

## 2024-05-03 RX ADMIN — FENTANYL CITRATE 25 MCG: 50 INJECTION, SOLUTION INTRAMUSCULAR; INTRAVENOUS at 15:15

## 2024-05-03 RX ADMIN — METOPROLOL SUCCINATE 25 MG: 25 TABLET, EXTENDED RELEASE ORAL at 09:04

## 2024-05-03 RX ADMIN — ATORVASTATIN CALCIUM 40 MG: 40 TABLET, FILM COATED ORAL at 20:10

## 2024-05-03 RX ADMIN — MIDAZOLAM 2 MG: 1 INJECTION INTRAMUSCULAR; INTRAVENOUS at 14:57

## 2024-05-03 RX ADMIN — FENTANYL CITRATE 25 MCG: 50 INJECTION, SOLUTION INTRAMUSCULAR; INTRAVENOUS at 14:59

## 2024-05-03 RX ADMIN — SODIUM CHLORIDE, PRESERVATIVE FREE 10 ML: 5 INJECTION INTRAVENOUS at 09:09

## 2024-05-03 RX ADMIN — FOLIC ACID 1 MG: 1 TABLET ORAL at 09:05

## 2024-05-03 RX ADMIN — SODIUM CHLORIDE, POTASSIUM CHLORIDE, SODIUM LACTATE AND CALCIUM CHLORIDE: 600; 310; 30; 20 INJECTION, SOLUTION INTRAVENOUS at 12:36

## 2024-05-03 RX ADMIN — SUCRALFATE 1 G: 1 TABLET ORAL at 09:06

## 2024-05-03 RX ADMIN — SODIUM CHLORIDE, PRESERVATIVE FREE 10 ML: 5 INJECTION INTRAVENOUS at 20:10

## 2024-05-03 RX ADMIN — Medication 0.2 MG: at 14:58

## 2024-05-03 RX ADMIN — PANTOPRAZOLE SODIUM 8 MG/HR: 40 INJECTION, POWDER, FOR SOLUTION INTRAVENOUS at 20:11

## 2024-05-03 RX ADMIN — ONDANSETRON 4 MG: 2 INJECTION INTRAMUSCULAR; INTRAVENOUS at 11:20

## 2024-05-03 RX ADMIN — SODIUM CHLORIDE, POTASSIUM CHLORIDE, SODIUM LACTATE AND CALCIUM CHLORIDE: 600; 310; 30; 20 INJECTION, SOLUTION INTRAVENOUS at 14:55

## 2024-05-03 RX ADMIN — SODIUM CHLORIDE, POTASSIUM CHLORIDE, SODIUM LACTATE AND CALCIUM CHLORIDE: 600; 310; 30; 20 INJECTION, SOLUTION INTRAVENOUS at 01:35

## 2024-05-03 RX ADMIN — Medication 100 MG: at 09:05

## 2024-05-03 RX ADMIN — PANTOPRAZOLE SODIUM 8 MG/HR: 40 INJECTION, POWDER, FOR SOLUTION INTRAVENOUS at 09:12

## 2024-05-03 RX ADMIN — IOPAMIDOL 100 ML: 755 INJECTION, SOLUTION INTRAVENOUS at 10:30

## 2024-05-03 RX ADMIN — PROPOFOL 200 MCG/KG/MIN: 10 INJECTION, EMULSION INTRAVENOUS at 14:59

## 2024-05-03 RX ADMIN — Medication 1 TABLET: at 09:16

## 2024-05-03 ASSESSMENT — PAIN SCALES - GENERAL
PAINLEVEL_OUTOF10: 0

## 2024-05-03 ASSESSMENT — PAIN - FUNCTIONAL ASSESSMENT: PAIN_FUNCTIONAL_ASSESSMENT: NONE - DENIES PAIN

## 2024-05-03 NOTE — FLOWSHEET NOTE
Patient transported to Pre-Op via wheelchair accompanied by writer without incident.    Electronically signed by OSMAR FIELDS RN on 5/3/2024 at 1:35 PM

## 2024-05-03 NOTE — FLOWSHEET NOTE
Patient returns to room 3027 from PACU on stretcher & cardiac monitor accompanied by PACU staff x2.  Patient transferred self from stretcher to ICU bed.  Patient connected to bedside cardiac monitor.  VS and assessment completed as charted.    Electronically signed by OSMAR FIELDS RN on 5/3/2024 at 6:45 PM

## 2024-05-03 NOTE — ANESTHESIA PRE PROCEDURE
Airway: Mallampati: III  TM distance: >3 FB   Neck ROM: full  Mouth opening: > = 3 FB   Dental:    (+) poor dentition      Pulmonary:normal exam                               Cardiovascular:    (+) hypertension: moderate, past MI: no interval change, hyperlipidemia         Beta Blocker:  Dose within 24 Hrs         Neuro/Psych:               GI/Hepatic/Renal:   (+) GERD:, renal disease: CRI          Endo/Other:    (+) blood dyscrasia: anemia:..                  ROS comment: ETOH Abdominal: normal exam            Vascular:          Other Findings:           Anesthesia Plan      MAC     ASA 3       Induction: intravenous.    MIPS: Postoperative opioids intended and Prophylactic antiemetics administered.  Anesthetic plan and risks discussed with patient.    Use of blood products discussed with patient whom consented to blood products.    Plan discussed with CRNA.                  Tiffany Aguiar MD   5/3/2024

## 2024-05-03 NOTE — PROGRESS NOTES
[facility] Gastroenterology Progress Note    Angela Ghotra is a 54 y.o. male patient.  Hospitalization Day:6      Chief consult reason: GI bleed      Subjective: Patient seen and examined.  Patient had large volume liquid maroon/black-colored stool late this morning x 2 following CTA.  Hemodynamically stable.  N.p.o.       Objective:   VITALS:  BP (!) 136/92   Pulse 67   Temp 98.2 °F (36.8 °C) (Oral)   Resp 18   Ht 1.702 m (5' 7\")   Wt 61.2 kg (135 lb)   SpO2 96%   BMI 21.14 kg/m²   TEMPERATURE:  Current - Temp: 98.2 °F (36.8 °C); Max - Temp  Av.4 °F (36.9 °C)  Min: 98 °F (36.7 °C)  Max: 98.9 °F (37.2 °C)    Physical Assessment:  General appearance:  alert, cooperative and no distress  Mental Status:  oriented to person, place and time and normal affect  Lungs:  clear to auscultation bilaterally, normal effort  Heart:  regular rate and rhythm, no murmur  Abdomen:  soft, nontender, nondistended, normal bowel sounds  Extremities:  no edema, no redness, No clubbing  Skin:  warm, dry, no gross lesions or rashes    CURRENT MEDICATIONS:  Scheduled Meds:   atorvastatin  40 mg Oral Nightly    folic acid  1 mg Oral Daily    metoprolol succinate  25 mg Oral Daily    therapeutic multivitamin-minerals  1 tablet Oral Daily    sucralfate  1 g Oral 4x Daily    thiamine  100 mg Oral Daily    sodium chloride flush  5-40 mL IntraVENous 2 times per day     Continuous Infusions:   sodium chloride      sodium chloride      pantoprazole 8 mg/hr (24 0912)    sodium chloride      lactated ringers IV soln 100 mL/hr at 24 0911    sodium chloride      sodium chloride      sodium chloride       PRN Meds:sodium chloride, sodium chloride, sodium chloride, sodium chloride, sodium chloride, sodium chloride flush, sodium chloride, ondansetron **OR** ondansetron, acetaminophen **OR** acetaminophen      Data Review:  LABS and IMAGING:     CBC  Recent Labs     24  0454 24  2054 24  0505 24  1026  for: \"LABGGT\"    PT/INR  No results for input(s): \"PROTIME\", \"INR\" in the last 72 hours.      Cancer Markers:  CEA:  No results found for: \"CEA\"  Ca 125:  No results found for: \"\"  Ca 19-9:   No results found for: \"\"  AFP: No results found for: \"AFP\"    Lactic acid:  No results for input(s): \"LACTACIDWB\" in the last 72 hours.        Radiology Review:    ..  US GI ENDOSCOPIC S&I   Final Result      US SPLEEN   Final Result   Unremarkable ultrasound of the spleen.  No findings of splenic vein   thrombosis.         US LIVER   Final Result   1. Portal vein is patent with hepatopetal flow.  No convincing evidence of   cirrhosis by sonographic technique.   2. Mildly dilated pancreatic duct.         CTA ABDOMEN PELVIS W WO CONTRAST   Final Result   1.  No CT evidence for active GI bleed.      2.  Inflammatory change and fluid about the distal body and tail the pancreas   with small amount of fluid abutting the stomach and likely associated fluid   about the posterolateral spleen.  The nearby colon also appears mildly   edematous, favored to be secondary to the pancreatic inflammatory change   versus primary colitis/diverticulitis.  Recommend CT follow-up in 3 months to   ensure resolution.         XR CHEST PORTABLE   Final Result   No acute process.         CTA ABDOMEN PELVIS W CONTRAST    (Results Pending)           ENDOSCOPY   EGD 04/28/2024    Findings:     Retropharyngeal area was grossly normal appearing     Esophagus: normal;                           Esophagogastric markings: Diaphragmatic hiatus- 36 cm; GE junction- 36 cm; Squamo-columnar junction- 36 cm     Stomach:    Fundus: abnormal: small gastric varices    Body: limited exam due to blood clots along greater curvature    Antrum: abnormal: portal hypertensive gastropathy     Duodenum:     Descending: normal    Bulb: normal     The scope was removed and the patient tolerated the procedure well.      After withdrawal of the gastroscope, patient lost his  pulses and was successfully resuscitated by the anesthesia team     Impression- Gastric varices with portal hypertensive gastropathy, no active bleeding     Recommendations/Plan:   Continue on Octreotide gtt  If patient re-bleeds, consider TIPSS  F/U In Office in 3-4 weeks  Discussed with the family     Electronically signed by Troy Rubio MD  on 4/28/2024 at 12:26 PM        Principal Problem:    GI bleed  Active Problems:    Alcohol use    Schatzki's ring    Hematemesis    Melena    Gastritis    Epigastric pain    Blood loss anemia    Elevated troponin    Hypokalemia    CKD (chronic kidney disease)  Resolved Problems:    * No resolved hospital problems. *       GI Assessment:    1.  Acute upper GI bleed with hematemesis and bright red blood per rectum  - EUS - 05/01/2024 - Focal thickening of gastric sub-mucosa, R/o- lymphoma            -  No gastric varices seen  - EGD - 04/30/2024- Small gastric varices and portal hypertensive gastropathy  - EGD - 04/28/2024 Gastric varices and portal hypertensive gastropathy.  Exam limited due to blood clots along greater curvature     2.  Acute blood loss anemia secondary to GI bleed -patient received 3 units PRBC before procedure and 2 overnight.  Current hemoglobin 8.7 g/dL     3. Chronic alcohol use x 40 years    4. HX of NSTEMI - (previous admission) suspected type II in stetting of acute anemia. Echo - preserved       Plan of care:  Continue Protonix drip,   Keep NPO  We will plan for EUS with biopsy of pancreatic tail mass suggested on imaging and for H. pylori biopsy tentatively plan for Hemospray while awaiting biopsy report for lymphoma      Time spent reviewing chart, seeing patient, documentation and coordination of care for the above conditions, and discussing with attending: Around 30 minutes    This plan was formulated in collaboration with Dr. Rubio  Please feel free to contact me with any questions or concerns.   Thank you for allowing me to participate in the  care of your patient.      Daniella Leary, FADY - CNP on 5/3/2024 at 9:23 AM   [facility]    Please note that this note was generated using a voice recognition dictation software.  Although every effort was made to ensure the accuracy of this automated transcription, some errors in transcription may have occurred.

## 2024-05-03 NOTE — FLOWSHEET NOTE
Patient transported to CT via wheelchair accompanied by writer without incident.    Electronically signed by OSMAR FIELDS RN on 5/3/2024 at 11:06 AM

## 2024-05-03 NOTE — FLOWSHEET NOTE
Patient returns to room 3027 via wheelchair accompanied by writer without incident.  Patient ambulates in room & to the bathroom independently without difficulty.    1059 - patient connected to cardiac monitor after using the bathroom.  VS as charted.    Electronically signed by OSMAR FIELDS RN on 5/3/2024 at 11:08 AM

## 2024-05-03 NOTE — PLAN OF CARE
Problem: Discharge Planning  Goal: Discharge to home or other facility with appropriate resources  5/2/2024 2110 by Dany Mcguire RN  Outcome: Progressing  5/2/2024 0935 by Karina Lane RN  Outcome: Progressing     Problem: Safety - Adult  Goal: Free from fall injury  5/2/2024 2110 by Dany Mcguire RN  Outcome: Progressing  5/2/2024 0935 by Karina Lane RN  Outcome: Progressing     Problem: ABCDS Injury Assessment  Goal: Absence of physical injury  5/2/2024 2110 by Dany Mcguire RN  Outcome: Progressing  5/2/2024 0935 by Karina Lane RN  Outcome: Progressing     Problem: Pain  Goal: Verbalizes/displays adequate comfort level or baseline comfort level  5/2/2024 2110 by Dany Mcguire RN  Outcome: Progressing  5/2/2024 0935 by Karina Lane RN  Outcome: Progressing

## 2024-05-03 NOTE — OP NOTE
PROCEDURE NOTE    DATE OF PROCEDURE: 5/3/2024     SURGEON: Troy Rubio MD  Facility: [facility]  ASSISTANT: None  Anesthesia: Monitored anesthesia care  PREOPERATIVE DIAGNOSIS: Upper GI bleed with mass near the tail of pancreas (CT-evidence)    Diagnosis:    POSTOPERATIVE DIAGNOSIS: As described below    OPERATION: Upper GI endoscopy with Endoscopic ultrasound with FNA,     ANESTHESIA: Moderate Sedation     ESTIMATED BLOOD LOSS: Less than 50 ml    COMPLICATIONS: None.     SPECIMENS:  Was Obtained: gastric mucosa    HISTORY: The patient is a 54 y.o. year old male with history of above preop diagnosis.  I recommended esophagogastroduodenoscopy with possible biopsy and I explained the risk, benefits, expected outcome, and alternatives to the procedure.  Risks included but are not limited to bleeding, infection, respiratory distress, hypotension, and perforation of the esophagus, stomach, or duodenum.  Patient understands and is in agreement.      PROCEDURE: The patient was given IV conscious sedation.  The patient's SPO2 remained above 90% throughout the procedure.The gastroscope was inserted orally and advanced under direct vision through the esophagus, through the stomach, through the pylorus, and into the descending duodenum.      Post sedation note :The patient's SPO2 remained above 90% throughout the procedure.the vital signs remained stable , and no immediate complication form the procedure noted, patient will be ready for d/c when criteria is met .      Findings:    Retropharyngeal area was grossly normal appearing    Esophagus: normal    Esophagogastric markings: Diaphragmatic hiatus- 36 cm; GE junction- 36 cm; Squamo-columnar junction- 36 cm    Stomach:    abnormal: thickened, erythematous gastric folds in distil part     Body: abnormal: thickened, erythematous gastric folds in proximal part in continuation with folds in distil fundus, biopsy obtained- friable mucosa with bleeding from biopsy site; Hemospray  powder used to control bleeding at the endo of the procedure    Antrum: normal     Duodenum:     Descending: normal    Bulb: normal    The scope was removed and the patient tolerated the procedure well.     With the patient lying in left lateral position, Olympus linear echoendoscope was introduced orally into esophagus, stomach and duodenum, findings are given below-  Esophagus- normal  Stomach and duodenum- thickened sub--mucosa along the greater vurvature  CBD/ GB- normal  Pancreas- normal  Left kidney and adrenal- normal  Left lobe of liver- fatty changes    Pancreas had normal echotexture throughout the parenchyma with mild diffuse fat infiltration. Main pancreatic duct was normal. There was one hypoechoic lesions near the tail of pancreas, size 2.8 cm x 3.5 cm cm in size. Using a [employer] 22 G needle 2 passes were made in to the center of the cyst and biopsy material obtained- a sample was sent for quick read which confirmed adequacy of the biopsy specimen. Peripancreatic blood vessels including spleno-portal axis, main portal vein and superior mesenteric veins are normal. CBD  (6.5 mm) was normal. PD in the head was 2.9 mm in diameter. Left adrenal gland and left kidney appeared normal. Visualized part of the right and left lobe of liver was normal echotexture. Celiac trunk take- off and descending aorta appeared normal. At the end of the procedure, the endoscope was withdrawn and stomach was decompressed. Patient tolerated procedure well and no immediate complications were encountered. prophylaxis.    Impression- EGD- Segmental thickening of gastric sub-mucosa along greater curvature with bleeding, biopsy obtained and hemospray powder applied  EUS- Hypoechoic lesion near tail of pancreas  S/P FNB    Recommendations/Plan:   F/U Biopsies  Monitor H&H  Continue on PPI    Electronically signed by Troy Rubio MD  on 5/3/2024 at 4:12 PM

## 2024-05-03 NOTE — PROGRESS NOTES
INTENSIVE CARE UNIT  Resident Physician Progress Note    Patient - Angela Ghotra  Date of Admission -  4/27/2024  7:41 PM  Date of Evaluation -  5/3/2024  Room and Bed Number -  3027/3027-01   Hospital Day - 6      SUBJECTIVE:     OVERNIGHT EVENTS:      4/30/2024: Patient underwent second look EGD which showed small gastric varices with portal hypertensive gastropathy.  Apparently, he had another episode of hematemesis.  Ultrasound Doppler of the spleen was done to rule out splenic vein thrombosis.  GI is planning to do EUS to assess gastric varices.    TODAY:  -No acute events overnight  -Neurologically intact.  Left eye swelling with clear drainage. No associated vision changes, erythema, or pain. On multivitamin, thiamine, and folic acid for alcohol use disorder  -Hemodynamically stable, on Toprol-XL 25.  -Maintaining oxygen saturations on room air.  -Patient reports 2 episodes of melena / 24 hrs.  On carafate and protonix gtt for hematemesis and melena. LFTs unremarkable  -Tolerating full liquid diet.  UOP 2.6 L/24 H --> net +9.5 L since admission  -On LR at 100 mL/h  -BUN 24/creatinine 1.8, baseline  -WBC 10.3, not on antibiotics  -Hb 6.5 s/p 1U pRBC. Received 2U total / 24h. Net 8U.  Denies any lightheadedness, weakness, shortness of breath, or abdominal pain  -Blood glucose 111  -DVT prophylaxis: Pneumatic compression devices.  Not on anticoagulation due to GI bleed.    5/1: EGD/EUS showed focal thickening of the gastric submucosa.  Biopsies obtained to rule out lymphoma.  No gastric varices present   Previous EGD on 4/8 showed 1gastric varix with edema in the gastric fundus and portal hypertensive gastropathy    Splenic ultrasound unremarkable. No liver cirrhosis, mildly dilated pancreatic duct on ultrasound    Speciality Recommendations:   GI- Repeat EGD if patient rebleeds    Plan:   -H+H q8h  -F/U GI recommendations  -warm compresses to eye    AWAKE & FOLLOWING COMMANDS:  [] No   [x]  status of the problem list as documented.    Patient is continue to have drop in hemoglobin intermittent melena.  Yesterday he got 2 unit packed RBC.  His hemoglobin was 6.5 this morning and he had received another unit of packed RBC posttransfusion hemoglobin was 8.2.  He was seen by GI and taken to the OR again for repeat endoscopy.  Off octreotide and he is on Protonix drip.  Will continue to follow H&H every 6 hours.  On full liquid diet.  Will continue to observe in ICU.  Will wait for the biopsy of the gastric mucosa.  Creatinine is 1.8 and is really stable urine output 2.6 L in last 24 hours.  Decrease IV fluid to 50 mill an hour  Discussed with nursing staff, treatment and plan discussed.  Discussed with respiratory therapist.    Total critical care time caring for this patient with life threatening, unstable organ failure, including direct patient contact, management of life support systems, review of data including imaging and labs, discussions with other team members and physicians at least 30  Min so far today, excluding procedures.       Please note that this chart was generated using voice recognition Dragon dictation software. Although every effort was made to ensure the accuracy of this automated transcription, some errors in transcription may have occurred.     Benjy Abdi MD  5/3/2024 2:16 PM

## 2024-05-04 LAB
ABO/RH: NORMAL
ANION GAP SERPL CALCULATED.3IONS-SCNC: 10 MMOL/L (ref 9–16)
ANTIBODY SCREEN: NEGATIVE
ARM BAND NUMBER: NORMAL
BLOOD BANK BLOOD PRODUCT EXPIRATION DATE: NORMAL
BLOOD BANK DISPENSE STATUS: NORMAL
BLOOD BANK ISBT PRODUCT BLOOD TYPE: 6200
BLOOD BANK PRODUCT CODE: NORMAL
BLOOD BANK SAMPLE EXPIRATION: NORMAL
BLOOD BANK UNIT TYPE AND RH: NORMAL
BPU ID: NORMAL
BUN SERPL-MCNC: 22 MG/DL (ref 6–20)
CA-I BLD-SCNC: 1.14 MMOL/L (ref 1.13–1.33)
CALCIUM SERPL-MCNC: 7.7 MG/DL (ref 8.6–10.4)
CHLORIDE SERPL-SCNC: 106 MMOL/L (ref 98–107)
CO2 SERPL-SCNC: 22 MMOL/L (ref 20–31)
COMPONENT: NORMAL
CREAT SERPL-MCNC: 1.6 MG/DL (ref 0.7–1.2)
CROSSMATCH RESULT: NORMAL
ERYTHROCYTE [DISTWIDTH] IN BLOOD BY AUTOMATED COUNT: 15.2 % (ref 11.8–14.4)
GFR, ESTIMATED: 52 ML/MIN/1.73M2
GLUCOSE BLD-MCNC: 94 MG/DL (ref 75–110)
GLUCOSE SERPL-MCNC: 88 MG/DL (ref 74–99)
HCT VFR BLD AUTO: 23.7 % (ref 40.7–50.3)
HCT VFR BLD AUTO: 24.5 % (ref 40.7–50.3)
HCT VFR BLD AUTO: 27.1 % (ref 40.7–50.3)
HGB BLD-MCNC: 8 G/DL (ref 13–17)
HGB BLD-MCNC: 8.3 G/DL (ref 13–17)
HGB BLD-MCNC: 8.7 G/DL (ref 13–17)
MCH RBC QN AUTO: 28.7 PG (ref 25.2–33.5)
MCHC RBC AUTO-ENTMCNC: 33.9 G/DL (ref 28.4–34.8)
MCV RBC AUTO: 84.8 FL (ref 82.6–102.9)
NRBC BLD-RTO: 0.2 PER 100 WBC
PARTIAL THROMBOPLASTIN TIME: 30.8 SEC (ref 23–36.5)
PLATELET # BLD AUTO: ABNORMAL K/UL (ref 138–453)
PLATELET, FLUORESCENCE: 130 K/UL (ref 138–453)
PLATELETS.RETICULATED NFR BLD AUTO: 3.9 % (ref 1.1–10.3)
POTASSIUM SERPL-SCNC: 3.7 MMOL/L (ref 3.7–5.3)
RBC # BLD AUTO: 2.89 M/UL (ref 4.21–5.77)
SODIUM SERPL-SCNC: 138 MMOL/L (ref 136–145)
TRANSFUSION STATUS: NORMAL
UNIT DIVISION: 0
UNIT ISSUE DATE/TIME: NORMAL
WBC OTHER # BLD: 10.1 K/UL (ref 3.5–11.3)

## 2024-05-04 PROCEDURE — 2580000003 HC RX 258

## 2024-05-04 PROCEDURE — 86850 RBC ANTIBODY SCREEN: CPT

## 2024-05-04 PROCEDURE — 85018 HEMOGLOBIN: CPT

## 2024-05-04 PROCEDURE — 86901 BLOOD TYPING SEROLOGIC RH(D): CPT

## 2024-05-04 PROCEDURE — 99291 CRITICAL CARE FIRST HOUR: CPT | Performed by: INTERNAL MEDICINE

## 2024-05-04 PROCEDURE — 82947 ASSAY GLUCOSE BLOOD QUANT: CPT

## 2024-05-04 PROCEDURE — 6360000002 HC RX W HCPCS

## 2024-05-04 PROCEDURE — 86920 COMPATIBILITY TEST SPIN: CPT

## 2024-05-04 PROCEDURE — 85014 HEMATOCRIT: CPT

## 2024-05-04 PROCEDURE — 2000000000 HC ICU R&B

## 2024-05-04 PROCEDURE — 82330 ASSAY OF CALCIUM: CPT

## 2024-05-04 PROCEDURE — 86900 BLOOD TYPING SEROLOGIC ABO: CPT

## 2024-05-04 PROCEDURE — 2580000003 HC RX 258: Performed by: INTERNAL MEDICINE

## 2024-05-04 PROCEDURE — 36415 COLL VENOUS BLD VENIPUNCTURE: CPT

## 2024-05-04 PROCEDURE — 85027 COMPLETE CBC AUTOMATED: CPT

## 2024-05-04 PROCEDURE — 80048 BASIC METABOLIC PNL TOTAL CA: CPT

## 2024-05-04 PROCEDURE — 6370000000 HC RX 637 (ALT 250 FOR IP): Performed by: INTERNAL MEDICINE

## 2024-05-04 PROCEDURE — 85730 THROMBOPLASTIN TIME PARTIAL: CPT

## 2024-05-04 PROCEDURE — 99231 SBSQ HOSP IP/OBS SF/LOW 25: CPT | Performed by: INTERNAL MEDICINE

## 2024-05-04 PROCEDURE — C9113 INJ PANTOPRAZOLE SODIUM, VIA: HCPCS

## 2024-05-04 PROCEDURE — 85055 RETICULATED PLATELET ASSAY: CPT

## 2024-05-04 RX ADMIN — SUCRALFATE 1 G: 1 TABLET ORAL at 21:06

## 2024-05-04 RX ADMIN — Medication 100 MG: at 09:19

## 2024-05-04 RX ADMIN — SODIUM CHLORIDE, PRESERVATIVE FREE 10 ML: 5 INJECTION INTRAVENOUS at 09:23

## 2024-05-04 RX ADMIN — SUCRALFATE 1 G: 1 TABLET ORAL at 17:30

## 2024-05-04 RX ADMIN — METOPROLOL SUCCINATE 25 MG: 25 TABLET, EXTENDED RELEASE ORAL at 09:19

## 2024-05-04 RX ADMIN — SODIUM CHLORIDE, PRESERVATIVE FREE 10 ML: 5 INJECTION INTRAVENOUS at 09:24

## 2024-05-04 RX ADMIN — SUCRALFATE 1 G: 1 TABLET ORAL at 13:52

## 2024-05-04 RX ADMIN — SODIUM CHLORIDE, POTASSIUM CHLORIDE, SODIUM LACTATE AND CALCIUM CHLORIDE: 600; 310; 30; 20 INJECTION, SOLUTION INTRAVENOUS at 22:16

## 2024-05-04 RX ADMIN — FOLIC ACID 1 MG: 1 TABLET ORAL at 09:19

## 2024-05-04 RX ADMIN — SUCRALFATE 1 G: 1 TABLET ORAL at 09:19

## 2024-05-04 RX ADMIN — PANTOPRAZOLE SODIUM 8 MG/HR: 40 INJECTION, POWDER, FOR SOLUTION INTRAVENOUS at 06:30

## 2024-05-04 RX ADMIN — Medication 1 TABLET: at 09:19

## 2024-05-04 RX ADMIN — ATORVASTATIN CALCIUM 40 MG: 40 TABLET, FILM COATED ORAL at 21:06

## 2024-05-04 RX ADMIN — SODIUM CHLORIDE, PRESERVATIVE FREE 10 ML: 5 INJECTION INTRAVENOUS at 09:22

## 2024-05-04 RX ADMIN — SODIUM CHLORIDE, POTASSIUM CHLORIDE, SODIUM LACTATE AND CALCIUM CHLORIDE: 600; 310; 30; 20 INJECTION, SOLUTION INTRAVENOUS at 04:46

## 2024-05-04 RX ADMIN — SODIUM CHLORIDE, PRESERVATIVE FREE 10 ML: 5 INJECTION INTRAVENOUS at 21:06

## 2024-05-04 RX ADMIN — PANTOPRAZOLE SODIUM 8 MG/HR: 40 INJECTION, POWDER, FOR SOLUTION INTRAVENOUS at 16:50

## 2024-05-04 ASSESSMENT — PAIN SCALES - GENERAL
PAINLEVEL_OUTOF10: 0

## 2024-05-04 NOTE — PROGRESS NOTES
Comprehensive Nutrition Assessment    Type and Reason for Visit:  Initial, RD Nutrition Re-Screen/LOS    Nutrition Recommendations/Plan:   Continue current Clear Liquid diet.  Will provide clear oral supplements.  Encourage intakes and monitor plans for diet advancement.  Will monitor labs, weights, and plan of care.     Malnutrition Assessment:  Malnutrition Status:  At risk for malnutrition (05/04/24 1540)    Context:  Acute Illness     Findings of the 6 clinical characteristics of malnutrition:  Energy Intake:  75% or less of estimated energy requirements for 7 or more days  Weight Loss:  No significant weight loss     Body Fat Loss:  No significant body fat loss    Muscle Mass Loss:  No significant muscle mass loss  Fluid Accumulation:  No significant fluid accumulation   Strength:  Not Performed    Nutrition Assessment:    Chart reviewed/pt seen for length of stay.  Admitted for hematemesis and rectal bleeding.  PMH: h/o alcohol abuse , HTN; recent admission for GI bleed EGD showed Schatzki's ring, mild gastritis and gastric varices with portal hypertensive gastropathy.  Currently on Clear Liquid diet.  This morning ate more than 75% of liquid tray.  Will provide clear oral supplements and monitor plans for diet advancement.  Reports abdominal pain this morning which has improved.  S/p EGD and EUS.  Weight fluctuations noted.  Labs reviewed.  Meds include: Folic Acid, MVI, Thiamine.    Nutrition Related Findings:    Labs/Meds reviewed.  Last BM 5/4. Wound Type: None       Current Nutrition Intake & Therapies:    Average Meal Intake: % (of clear liquids today)  Average Supplements Intake: None Ordered  ADULT DIET; Clear Liquid  Additional Calorie Sources:  None    Anthropometric Measures:  Height: 170.2 cm (5' 7.01\")  Ideal Body Weight (IBW): 148 lbs (67 kg)    Admission Body Weight: 61.5 kg (135 lb 9.3 oz)  Current Body Weight: 61.2 kg (134 lb 14.7 oz), 91.2 % IBW. Weight Source: Bed Scale  Current

## 2024-05-04 NOTE — PLAN OF CARE
Problem: Discharge Planning  Goal: Discharge to home or other facility with appropriate resources  5/3/2024 2119 by Dany Mcguire RN  Outcome: Progressing  5/3/2024 1732 by Ashley Ryan RN  Outcome: Progressing     Problem: Safety - Adult  Goal: Free from fall injury  5/3/2024 2119 by Dany Mcguire RN  Outcome: Progressing  5/3/2024 1732 by Ashley Ryan RN  Outcome: Progressing     Problem: ABCDS Injury Assessment  Goal: Absence of physical injury  5/3/2024 2119 by Dany Mcguire RN  Outcome: Progressing  5/3/2024 1732 by Ashley Ryan RN  Outcome: Progressing     Problem: Pain  Goal: Verbalizes/displays adequate comfort level or baseline comfort level  5/3/2024 2119 by Dany Mcguire RN  Outcome: Progressing  5/3/2024 1732 by Ashley Ryan RN  Outcome: Progressing     Problem: Skin/Tissue Integrity  Goal: Absence of new skin breakdown  Description: 1.  Monitor for areas of redness and/or skin breakdown  2.  Assess vascular access sites hourly  3.  Every 4-6 hours minimum:  Change oxygen saturation probe site  4.  Every 4-6 hours:  If on nasal continuous positive airway pressure, respiratory therapy assess nares and determine need for appliance change or resting period.  Outcome: Progressing

## 2024-05-04 NOTE — PROGRESS NOTES
INTENSIVE CARE UNIT  Resident Physician Progress Note    Patient - Angela Ghotra  Date of Admission -  4/27/2024  7:41 PM  Date of Evaluation -  5/4/2024  Room and Bed Number -  3027/3027-01   Hospital Day - 7      SUBJECTIVE:     OVERNIGHT EVENTS:      No acute events overnight     TODAY:  --No acute events overnight.  --Neurologically intact. Patient denies any lethargy, lightheadedness, weakness, or dizziness  -- Hemodynamically stable.  MAP 79.  On Toprol-XL 25 daily  -- Saturating appropriately on room air  -- Denies any further episodes of hematemesis or melena.  On Carafate and Protonix gtt  -- Repeat EGD yesterday showed segmental thickening of the gastric submucosa with bleeding from biopsy site, Hemosprayed. 2.8 cm x 3.5 cm hypoechoic lesion near the tail of the pancreas fine-needle biopsy obtained.   H.pylori negative  --NPO.  Diet per GI  --On LR at 100 mL/h  --UOP 3 L over the past 24 hour.  --> +10.8 L since admission  --BUN 22 / Cr 1.6, around baseline  --WBC 10.1  --Hb 8.3 s/p 3 unit PRBC.  Net 11 U platelets since admission. Denies any further episodes of hematemesis or melena. No apparent signs of bleeding   H.pylori negative   --Glucose 94  --DVT PPx: Pneumatic compression device    Speciality Recommendations:   GI- continue on protonix gtt    Plan:   -H+H q8h  -F/U GI recommendations  -F/U biopsies     Interval events:   4/30: EGD which showed small gastric varices with portal hypertensive gastropathy.    5/1:  EGD/EUS showed focal thickening of the gastric submucosa.  Biopsies obtained to rule out lymphoma.  No gastric varices present   - Previous EGD on 4/8 showed 1gastric varix with edema in the gastric fundus and portal hypertensive gastropathy    - Splenic ultrasound unremarkable. No liver cirrhosis, mildly dilated pancreatic duct on ultrasound    5/2: s/p 3 U pRBC. Off protonix drip     5/3: s/p 2 U pRBC, 1 U FFP, and 1 U platelets    EGD/EUS: segmental thickening of the gastric submucosa  least 30  Min so far today, excluding procedures.       Please note that this chart was generated using voice recognition Dragon dictation software. Although every effort was made to ensure the accuracy of this automated transcription, some errors in transcription may have occurred.     Benjy Abdi MD  5/4/2024 12:14 PM

## 2024-05-04 NOTE — PROGRESS NOTES
20    [facility]   Gastroenterology Progress Note    Angela Ghotra is a 54 y.o. male patient.  Hospitalization Day:7      Chief consult reason:   Shortness of breath    Subjective:  Pt seen and examined. No acute events overnight.   Pt had EUS yesterday that identified a pancreatic tail lesion 2.8 x 3.5 cm -bx pending. Peripancreatic blood vessels including spleno-portal axis, main portal vein and superior mesenteric veins are normal. CBD  (6.5 mm) was normal.   EGD showed thick gastric folds-bx taken and pending. Friable tissue noted-hemospray used.   No rectal bleeding overnight.   Pt had abd pain earlier this am, but that subsided with medication.   Hgb 8.3-8.0 g/dL this am    5/3/2024 EUS per Dr. Rubio:  Findings:     Retropharyngeal area was grossly normal appearing     Esophagus: normal                          Esophagogastric markings: Diaphragmatic hiatus- 36 cm; GE junction- 36 cm; Squamo-columnar junction- 36 cm     Stomach:    abnormal: thickened, erythematous gastric folds in distil part     Body: abnormal: thickened, erythematous gastric folds in proximal part in continuation with folds in distil fundus, biopsy obtained- friable mucosa with bleeding from biopsy site; Hemospray powder used to control bleeding at the endo of the procedure    Antrum: normal     Duodenum:     Descending: normal    Bulb: normal     The scope was removed and the patient tolerated the procedure well.      With the patient lying in left lateral position, Olympus linear echoendoscope was introduced orally into esophagus, stomach and duodenum, findings are given below-  Esophagus- normal  Stomach and duodenum- thickened sub--mucosa along the greater vurvature  CBD/ GB- normal  Pancreas- normal  Left kidney and adrenal- normal  Left lobe of liver- fatty changes     Pancreas had normal echotexture throughout the parenchyma with mild diffuse fat infiltration. Main pancreatic duct was normal. There was one hypoechoic  lesions near the tail of pancreas, size 2.8 cm x 3.5 cm cm in size. Using a [employer] 22 G needle 2 passes were made in to the center of the cyst and biopsy material obtained- a sample was sent for quick read which confirmed adequacy of the biopsy specimen. Peripancreatic blood vessels including spleno-portal axis, main portal vein and superior mesenteric veins are normal. CBD  (6.5 mm) was normal. PD in the head was 2.9 mm in diameter. Left adrenal gland and left kidney appeared normal. Visualized part of the right and left lobe of liver was normal echotexture. Celiac trunk take- off and descending aorta appeared normal. At the end of the procedure, the endoscope was withdrawn and stomach was decompressed. Patient tolerated procedure well and no immediate complications were encountered. prophylaxis.     Impression- EGD- Segmental thickening of gastric sub-mucosa along greater curvature with bleeding, biopsy obtained and hemospray powder applied  EUS- Hypoechoic lesion near tail of pancreas  S/P FNB     Recommendations/Plan:   F/U Biopsies  Monitor H&H  Continue on PPI     Electronically signed by Troy Rubio MD  on 5/3/2024 at 4:12 PM   VITALS:  /60   Pulse 66   Temp 98.1 °F (36.7 °C) (Oral)   Resp 16   Ht 1.702 m (5' 7\")   Wt 61.2 kg (135 lb)   SpO2 97%   BMI 21.14 kg/m²   TEMPERATURE:  Current - Temp: 98.1 °F (36.7 °C); Max - Temp  Av.1 °F (36.7 °C)  Min: 96.8 °F (36 °C)  Max: 99 °F (37.2 °C)    Physical Assessment:  General appearance:  alert, cooperative and no distress  Mental Status:  oriented to person, place and time and normal affect  Lungs:  clear to auscultation bilaterally, normal effort  Heart:  regular rate and rhythm, no murmur  Abdomen:  soft, nontender, nondistended, normal bowel sounds, no masses, hepatomegaly, splenomegaly  Extremities:  no edema, redness, tenderness in the calves  Skin:  no gross lesions, rashes, induration    Data Review:    Labs and Imaging:     CBC:  Recent

## 2024-05-04 NOTE — PLAN OF CARE
Problem: Discharge Planning  Goal: Discharge to home or other facility with appropriate resources  Outcome: Progressing     Problem: Safety - Adult  Goal: Free from fall injury  Outcome: Progressing     Problem: ABCDS Injury Assessment  Goal: Absence of physical injury  Outcome: Progressing     Problem: Pain  Goal: Verbalizes/displays adequate comfort level or baseline comfort level  Outcome: Progressing     Problem: Skin/Tissue Integrity  Goal: Absence of new skin breakdown  Description: 1.  Monitor for areas of redness and/or skin breakdown  2.  Assess vascular access sites hourly  3.  Every 4-6 hours minimum:  Change oxygen saturation probe site  4.  Every 4-6 hours:  If on nasal continuous positive airway pressure, respiratory therapy assess nares and determine need for appliance change or resting period.  Outcome: Progressing     Problem: Gastrointestinal - Adult  Goal: Minimal or absence of nausea and vomiting  Outcome: Progressing  Goal: Maintains or returns to baseline bowel function  Outcome: Progressing  Goal: Maintains adequate nutritional intake  Outcome: Progressing     Problem: Metabolic/Fluid and Electrolytes - Adult  Goal: Electrolytes maintained within normal limits  Outcome: Progressing  Goal: Hemodynamic stability and optimal renal function maintained  Outcome: Progressing  Goal: Glucose maintained within prescribed range  Outcome: Progressing     Problem: Nutrition Deficit:  Goal: Optimize nutritional status  Outcome: Progressing      Problem: Patient Care Overview (Adult)  Goal: Plan of Care Review  Outcome: Ongoing (interventions implemented as appropriate)    03/12/17 1438 03/13/17 0507   Coping/Psychosocial Response Interventions   Plan Of Care Reviewed With patient --    Patient Care Overview   Progress progress toward functional goals as expected --    Outcome Evaluation   Outcome Summary/Follow up Plan --  Pt rested well this shift. Spouse at bedside. VSS. SB/NSR. Ambulated in the hallway. No c/o pain.        Goal: Adult Individualization and Mutuality  Outcome: Ongoing (interventions implemented as appropriate)  Goal: Discharge Needs Assessment  Outcome: Ongoing (interventions implemented as appropriate)    Problem: Fall Risk (Adult)  Goal: Identify Related Risk Factors and Signs and Symptoms  Outcome: Ongoing (interventions implemented as appropriate)  Goal: Absence of Falls  Outcome: Ongoing (interventions implemented as appropriate)    Problem: Arrhythmia/Dysrhythmia (Symptomatic) (Adult)  Goal: Signs and Symptoms of Listed Potential Problems Will be Absent or Manageable (Arrhythmia/Dysrhythmia)  Outcome: Ongoing (interventions implemented as appropriate)

## 2024-05-04 NOTE — PROGRESS NOTES
Patient complained of sudden and sharp pain at both upper quadrant of abdomen. Pain scale of 7/10 as claimed, denied nausea and vomiting, + bowel sounds and he mentioned that he was passing gas down below. Dr. Fonseca made aware and he examined the patient.

## 2024-05-05 LAB
ANION GAP SERPL CALCULATED.3IONS-SCNC: 8 MMOL/L (ref 9–16)
BUN SERPL-MCNC: 22 MG/DL (ref 6–20)
CALCIUM SERPL-MCNC: 7.6 MG/DL (ref 8.6–10.4)
CHLORIDE SERPL-SCNC: 107 MMOL/L (ref 98–107)
CO2 SERPL-SCNC: 22 MMOL/L (ref 20–31)
CREAT SERPL-MCNC: 1.7 MG/DL (ref 0.7–1.2)
ERYTHROCYTE [DISTWIDTH] IN BLOOD BY AUTOMATED COUNT: 15.6 % (ref 11.8–14.4)
GFR, ESTIMATED: 47 ML/MIN/1.73M2
GLUCOSE SERPL-MCNC: 91 MG/DL (ref 74–99)
HCT VFR BLD AUTO: 21.1 % (ref 40.7–50.3)
HCT VFR BLD AUTO: 21.6 % (ref 40.7–50.3)
HCT VFR BLD AUTO: 26.9 % (ref 40.7–50.3)
HCT VFR BLD AUTO: 31.4 % (ref 40.7–50.3)
HGB BLD-MCNC: 10.4 G/DL (ref 13–17)
HGB BLD-MCNC: 6.8 G/DL (ref 13–17)
HGB BLD-MCNC: 7.1 G/DL (ref 13–17)
HGB BLD-MCNC: 8.8 G/DL (ref 13–17)
MCH RBC QN AUTO: 29 PG (ref 25.2–33.5)
MCHC RBC AUTO-ENTMCNC: 32.9 G/DL (ref 28.4–34.8)
MCV RBC AUTO: 88.2 FL (ref 82.6–102.9)
NRBC BLD-RTO: 0 PER 100 WBC
PLATELET # BLD AUTO: ABNORMAL K/UL (ref 138–453)
PLATELET, FLUORESCENCE: 175 K/UL (ref 138–453)
PLATELETS.RETICULATED NFR BLD AUTO: 2.9 % (ref 1.1–10.3)
POTASSIUM SERPL-SCNC: 3.6 MMOL/L (ref 3.7–5.3)
RBC # BLD AUTO: 2.45 M/UL (ref 4.21–5.77)
SODIUM SERPL-SCNC: 137 MMOL/L (ref 136–145)
WBC OTHER # BLD: 8.5 K/UL (ref 3.5–11.3)

## 2024-05-05 PROCEDURE — 6370000000 HC RX 637 (ALT 250 FOR IP): Performed by: INTERNAL MEDICINE

## 2024-05-05 PROCEDURE — 6370000000 HC RX 637 (ALT 250 FOR IP)

## 2024-05-05 PROCEDURE — 85014 HEMATOCRIT: CPT

## 2024-05-05 PROCEDURE — 99291 CRITICAL CARE FIRST HOUR: CPT | Performed by: INTERNAL MEDICINE

## 2024-05-05 PROCEDURE — 6370000000 HC RX 637 (ALT 250 FOR IP): Performed by: NURSE PRACTITIONER

## 2024-05-05 PROCEDURE — 85018 HEMOGLOBIN: CPT

## 2024-05-05 PROCEDURE — 85055 RETICULATED PLATELET ASSAY: CPT

## 2024-05-05 PROCEDURE — C9113 INJ PANTOPRAZOLE SODIUM, VIA: HCPCS

## 2024-05-05 PROCEDURE — 2580000003 HC RX 258

## 2024-05-05 PROCEDURE — 2580000003 HC RX 258: Performed by: INTERNAL MEDICINE

## 2024-05-05 PROCEDURE — 99231 SBSQ HOSP IP/OBS SF/LOW 25: CPT | Performed by: INTERNAL MEDICINE

## 2024-05-05 PROCEDURE — 6360000002 HC RX W HCPCS

## 2024-05-05 PROCEDURE — 36415 COLL VENOUS BLD VENIPUNCTURE: CPT

## 2024-05-05 PROCEDURE — P9040 RBC LEUKOREDUCED IRRADIATED: HCPCS

## 2024-05-05 PROCEDURE — 36430 TRANSFUSION BLD/BLD COMPNT: CPT

## 2024-05-05 PROCEDURE — 2000000000 HC ICU R&B

## 2024-05-05 PROCEDURE — 80048 BASIC METABOLIC PNL TOTAL CA: CPT

## 2024-05-05 PROCEDURE — 85027 COMPLETE CBC AUTOMATED: CPT

## 2024-05-05 RX ORDER — BISACODYL 5 MG/1
20 TABLET, DELAYED RELEASE ORAL ONCE
Status: COMPLETED | OUTPATIENT
Start: 2024-05-05 | End: 2024-05-05

## 2024-05-05 RX ORDER — SODIUM CHLORIDE 9 MG/ML
INJECTION, SOLUTION INTRAVENOUS PRN
Status: DISCONTINUED | OUTPATIENT
Start: 2024-05-05 | End: 2024-05-05

## 2024-05-05 RX ADMIN — SODIUM CHLORIDE, POTASSIUM CHLORIDE, SODIUM LACTATE AND CALCIUM CHLORIDE: 600; 310; 30; 20 INJECTION, SOLUTION INTRAVENOUS at 17:37

## 2024-05-05 RX ADMIN — SODIUM CHLORIDE, PRESERVATIVE FREE 10 ML: 5 INJECTION INTRAVENOUS at 08:57

## 2024-05-05 RX ADMIN — ATORVASTATIN CALCIUM 40 MG: 40 TABLET, FILM COATED ORAL at 21:12

## 2024-05-05 RX ADMIN — SUCRALFATE 1 G: 1 TABLET ORAL at 21:13

## 2024-05-05 RX ADMIN — Medication 1 TABLET: at 08:55

## 2024-05-05 RX ADMIN — SUCRALFATE 1 G: 1 TABLET ORAL at 12:42

## 2024-05-05 RX ADMIN — BISACODYL 20 MG: 5 TABLET, COATED ORAL at 12:35

## 2024-05-05 RX ADMIN — PANTOPRAZOLE SODIUM 8 MG/HR: 40 INJECTION, POWDER, FOR SOLUTION INTRAVENOUS at 02:53

## 2024-05-05 RX ADMIN — SODIUM CHLORIDE, PRESERVATIVE FREE 10 ML: 5 INJECTION INTRAVENOUS at 21:11

## 2024-05-05 RX ADMIN — FOLIC ACID 1 MG: 1 TABLET ORAL at 08:55

## 2024-05-05 RX ADMIN — SUCRALFATE 1 G: 1 TABLET ORAL at 08:55

## 2024-05-05 RX ADMIN — SODIUM CHLORIDE, PRESERVATIVE FREE 10 ML: 5 INJECTION INTRAVENOUS at 08:58

## 2024-05-05 RX ADMIN — Medication 100 MG: at 08:55

## 2024-05-05 RX ADMIN — PANTOPRAZOLE SODIUM 8 MG/HR: 40 INJECTION, POWDER, FOR SOLUTION INTRAVENOUS at 14:36

## 2024-05-05 RX ADMIN — METOPROLOL SUCCINATE 25 MG: 25 TABLET, EXTENDED RELEASE ORAL at 08:55

## 2024-05-05 RX ADMIN — SUCRALFATE 1 G: 1 TABLET ORAL at 17:38

## 2024-05-05 RX ADMIN — POTASSIUM BICARBONATE 40 MEQ: 782 TABLET, EFFERVESCENT ORAL at 05:41

## 2024-05-05 RX ADMIN — POLYETHYLENE GLYCOL 3350, SODIUM SULFATE ANHYDROUS, SODIUM BICARBONATE, SODIUM CHLORIDE, POTASSIUM CHLORIDE 4000 ML: 236; 22.74; 6.74; 5.86; 2.97 POWDER, FOR SOLUTION ORAL at 12:35

## 2024-05-05 ASSESSMENT — PAIN SCALES - GENERAL
PAINLEVEL_OUTOF10: 0

## 2024-05-05 NOTE — PLAN OF CARE
Problem: Discharge Planning  Goal: Discharge to home or other facility with appropriate resources  Outcome: Progressing     Problem: Safety - Adult  Goal: Free from fall injury  Outcome: Progressing     Problem: ABCDS Injury Assessment  Goal: Absence of physical injury  Outcome: Progressing  Flowsheets (Taken 5/5/2024 0800)  Absence of Physical Injury: Implement safety measures based on patient assessment     Problem: Pain  Goal: Verbalizes/displays adequate comfort level or baseline comfort level  Outcome: Progressing     Problem: Skin/Tissue Integrity  Goal: Absence of new skin breakdown  Description: 1.  Monitor for areas of redness and/or skin breakdown  2.  Assess vascular access sites hourly  3.  Every 4-6 hours minimum:  Change oxygen saturation probe site  4.  Every 4-6 hours:  If on nasal continuous positive airway pressure, respiratory therapy assess nares and determine need for appliance change or resting period.  Outcome: Progressing     Problem: Gastrointestinal - Adult  Goal: Minimal or absence of nausea and vomiting  Outcome: Progressing  Goal: Maintains or returns to baseline bowel function  Outcome: Progressing  Goal: Maintains adequate nutritional intake  Outcome: Progressing     Problem: Metabolic/Fluid and Electrolytes - Adult  Goal: Electrolytes maintained within normal limits  Outcome: Progressing  Goal: Hemodynamic stability and optimal renal function maintained  Outcome: Progressing  Goal: Glucose maintained within prescribed range  Outcome: Progressing     Problem: Nutrition Deficit:  Goal: Optimize nutritional status  Outcome: Progressing

## 2024-05-05 NOTE — PROGRESS NOTES
20    [facility]   Gastroenterology Progress Note    Angela Ghotra is a 54 y.o. male patient.  Hospitalization Day:8      Chief consult reason:   Shortness of breath    Subjective:  Pt seen and examined. In the last 24 hours pt has had 4 episodes of Melena after drinking liquids or taking meds.   Denies any abdominal pain, hematemesis.   Hgb dropped slightly from 8.7-7 0.1-6.8 this a.m. for which she received 1 unit of PRBCs with a redraw at 8.8 g/dL this a.m..   Vital signs stable, no pressors at this time    VITALS:  /77   Pulse 70   Temp 98.2 °F (36.8 °C) (Oral)   Resp 17   Ht 1.702 m (5' 7.01\")   Wt 60.3 kg (132 lb 15 oz)   SpO2 100%   BMI 20.82 kg/m²   TEMPERATURE:  Current - Temp: 98.2 °F (36.8 °C); Max - Temp  Av.3 °F (36.8 °C)  Min: 98.1 °F (36.7 °C)  Max: 98.6 °F (37 °C)    Physical Assessment:  General appearance:  alert, cooperative and no distress  Mental Status:  oriented to person, place and time and normal affect  Lungs:  clear to auscultation bilaterally, normal effort  Heart:  regular rate and rhythm, no murmur  Abdomen:  soft, nontender, nondistended, normal bowel sounds, no masses, hepatomegaly, splenomegaly  Extremities:  no edema, redness, tenderness in the calves  Skin:  no gross lesions, rashes, induration    Data Review:    Labs and Imaging:     CBC:  Recent Labs     24  0244 24  0802 24  0145 24  1020 24  1804 24  0220 24  0423 24  1035   WBC 10.3  --  10.1  --   --  8.5  --   --    HGB 6.5*   < > 8.3* 8.0* 8.7* 7.1* 6.8* 8.8*   MCV 88.1  --  84.8  --   --  88.2  --   --    RDW 15.7*  --  15.2*  --   --  15.6*  --   --    *  --  See Reflexed IPF Result  --   --  See Reflexed IPF Result  --   --     < > = values in this interval not displayed.         ANEMIA STUDIES:  No results for input(s): \"TIBC\", \"FERRITIN\", \"SZIEUIBQ35\", \"FOLATE\", \"OCCULTBLD\" in the last 72 hours.    Invalid input(s):  \"LABIRON\"    BMP:  Recent Labs     05/03/24  0244 05/04/24  0754 05/05/24  0220   * 138 137   K 3.7 3.7 3.6*    106 107   CO2 21 22 22   BUN 24* 22* 22*   CREATININE 1.8* 1.6* 1.7*   GLUCOSE 111* 88 91   CALCIUM 7.3* 7.7* 7.6*         LFTS:  No results for input(s): \"ALKPHOS\", \"ALT\", \"AST\", \"BILITOT\", \"BILIDIR\", \"LABALBU\" in the last 72 hours.      Amylase/Lipase and Ammonia:  No results for input(s): \"AMYLASE\", \"LIPASE\", \"AMMONIA\" in the last 72 hours.    Acute Hepatitis Panel:  No results found for: \"HEPBSAG\", \"HEPCAB\", \"HEPBIGM\", \"HEPAIGM\"    HCV Genotype:  No components found for: \"HEPATITISCGENOTYPE\"    HCV Quantitative:  No results found for: \"HCVQNT\"    LIVER WORK UP:    AFP  No results found for: \"AFP\"    Alpha 1 antitrypsin   No results found for: \"A1A\"    CONTRERAS  No results found for: \"CONTRERAS\"    AMA  No results found for: \"MITOAB\"    ASMA  No results found for: \"SMOOTHMUSCAB\"    PT/INR  No results for input(s): \"PROTIME\", \"INR\" in the last 72 hours.    Cancer Markers:  CEA:  No results for input(s): \"CEA\" in the last 72 hours.  Ca 125:  No results for input(s): \"\" in the last 72 hours.  Ca 19-9:   Invalid input(s): \"\"  AFP: No results for input(s): \"AFP\" in the last 72 hours.  Lactic acid:Invalid input(s): \"LACTIC ACID\"    Radiology Review:    US LIVER    Result Date: 4/29/2024  EXAMINATION: RIGHT UPPER QUADRANT ULTRASOUND 4/29/2024 6:15 pm COMPARISON: None. HISTORY: ORDERING SYSTEM PROVIDED HISTORY: cirrhosis, eval portal HTN, need to eval for portal vein thrombosis as well TECHNOLOGIST PROVIDED HISTORY: cirrhosis, eval portal HTN, need to eval for portal vein thrombosis as well FINDINGS: LIVER:  The liver demonstrates normal echogenicity without evidence of intrahepatic biliary ductal dilatation.  Portal vein is patent with hepatopetal flow. BILIARY SYSTEM: Adenomyomatosis of the gallbladder fundus.  Otherwise gallbladder is unremarkable without evidence of pericholecystic fluid, wall

## 2024-05-05 NOTE — PLAN OF CARE
Problem: Discharge Planning  Goal: Discharge to home or other facility with appropriate resources  5/5/2024 0000 by ABAD SOLIS  Outcome: Progressing  Flowsheets (Taken 5/4/2024 2000)  Discharge to home or other facility with appropriate resources:   Identify barriers to discharge with patient and caregiver   Arrange for needed discharge resources and transportation as appropriate   Identify discharge learning needs (meds, wound care, etc)   Arrange for interpreters to assist at discharge as needed   Refer to discharge planning if patient needs post-hospital services based on physician order or complex needs related to functional status, cognitive ability or social support system  5/4/2024 1752 by Ashley Ryan, RN  Outcome: Progressing     Problem: Safety - Adult  Goal: Free from fall injury  5/5/2024 0000 by ABAD SOLIS  Outcome: Progressing  5/4/2024 1752 by Ashley Ryan RN  Outcome: Progressing     Problem: ABCDS Injury Assessment  Goal: Absence of physical injury  5/5/2024 0000 by ABAD SOLIS  Outcome: Progressing  Flowsheets (Taken 5/4/2024 2001)  Absence of Physical Injury: Implement safety measures based on patient assessment  5/4/2024 1752 by Ashley Ryan, RN  Outcome: Progressing     Problem: Pain  Goal: Verbalizes/displays adequate comfort level or baseline comfort level  5/5/2024 0000 by ABAD SOLIS  Outcome: Progressing  Flowsheets (Taken 5/4/2024 2000)  Verbalizes/displays adequate comfort level or baseline comfort level:   Encourage patient to monitor pain and request assistance   Assess pain using appropriate pain scale   Administer analgesics based on type and severity of pain and evaluate response   Implement non-pharmacological measures as appropriate and evaluate response   Consider cultural and social influences on pain and pain management   Notify Licensed Independent Practitioner if interventions unsuccessful or patient reports new pain  5/4/2024 1752 by Connor  Ashley MARIE RN  Outcome: Progressing     Problem: Skin/Tissue Integrity  Goal: Absence of new skin breakdown  Description: 1.  Monitor for areas of redness and/or skin breakdown  2.  Assess vascular access sites hourly  3.  Every 4-6 hours minimum:  Change oxygen saturation probe site  4.  Every 4-6 hours:  If on nasal continuous positive airway pressure, respiratory therapy assess nares and determine need for appliance change or resting period.  5/5/2024 0000 by ABAD SOLIS  Outcome: Progressing  5/4/2024 1752 by Ashley Ryan RN  Outcome: Progressing     Problem: Gastrointestinal - Adult  Goal: Minimal or absence of nausea and vomiting  5/5/2024 0000 by ABAD SOLIS  Outcome: Progressing  Flowsheets (Taken 5/4/2024 2000)  Minimal or absence of nausea and vomiting:   Administer IV fluids as ordered to ensure adequate hydration   Administer ordered antiemetic medications as needed   Provide nonpharmacologic comfort measures as appropriate   Advance diet as tolerated, if ordered   Nutrition consult to assist patient with adequate nutrition and appropriate food choices  5/4/2024 1752 by Ashley Ryan RN  Outcome: Progressing  Goal: Maintains or returns to baseline bowel function  5/5/2024 0000 by ABAD SOLIS  Outcome: Progressing  Flowsheets (Taken 5/4/2024 2000)  Maintains or returns to baseline bowel function:   Assess bowel function   Encourage oral fluids to ensure adequate hydration   Administer IV fluids as ordered to ensure adequate hydration   Administer ordered medications as needed   Encourage mobilization and activity   Nutrition consult to assist patient with appropriate food choices  5/4/2024 1752 by Ashley Ryan, RN  Outcome: Progressing  Goal: Maintains adequate nutritional intake  5/5/2024 0000 by ABAD SOLIS  Outcome: Progressing  Flowsheets (Taken 5/4/2024 2000)  Maintains adequate nutritional intake:   Monitor percentage of each meal consumed   Identify factors contributing

## 2024-05-05 NOTE — PROGRESS NOTES
INTENSIVE CARE UNIT  Resident Physician Progress Note    Patient - Angela Ghotra  Date of Admission -  4/27/2024  7:41 PM  Date of Evaluation -  5/5/2024  Room and Bed Number -  3027/3027-01   Hospital Day - 8      SUBJECTIVE:     OVERNIGHT EVENTS:      1 episode of melena.     TODAY:  --Neurologically intact. Patient denies any lethargy, lightheadedness, or weakness  -- Hemodynamically stable.  On Toprol-XL 25 daily  -- Saturating appropriately on room air  -- Tolerating FLD, On Carafate and Protonix gtt  --BM x3 overnight, melena. Patient reports 2 episodes of melena this morning, 500mL output.  --On LR at 100 mL/h  --UOP 1.8 L over the past 24 hour.  --> +11.4 L since admission  --BUN 22 / Cr 1.7, baseline 1.8  --K 3.6   --WBC 8.5  --Hb 6.8  Net 11 U pRBC, 1 U platelets, 1 U FFP since admission.    H.pylori negative   --Glucose 91  --DVT PPx: Pneumatic compression device    Speciality Recommendations:   GI- continue on protonix gtt, do not advance diet more than full liquids due to risk of bleeding    Plan:   -1 U pRBC   -replace potassium  -F/U CEA and CA 19-9  -F/U GI recommendations  -F/U biopsies     Interval events:   4/30: EGD which showed small gastric varices with portal hypertensive gastropathy.    5/1:  EGD/EUS showed focal thickening of the gastric submucosa.  Biopsies obtained to rule out lymphoma.  No gastric varices present   - Previous EGD on 4/8 showed 1gastric varix with edema in the gastric fundus and portal hypertensive gastropathy   -H.pylori negative  - Splenic ultrasound unremarkable. No liver cirrhosis, mildly dilated pancreatic duct on ultrasound    5/2: s/p 3 U pRBC. Off protonix drip     5/3: s/p 2 U pRBC, 1 U FFP, and 1 U platelets    EGD/EUS: segmental thickening of the gastric submucosa with bleeding, Hemosprayed.  Hypoechoic lesion near the tail of the pancreas fine-needle biopsy obtained.    5/5: Hb 6.8 s/p 1 U pRBC    AWAKE & FOLLOWING COMMANDS:  [] No   [x] Yes    SECRETIONS Amount:  EGD/EUS showed focal thickening of the gastric submucosa.  Biopsies obtained to rule out lymphoma.  No gastric varices present  Splenic ultrasound unremarkable. No liver cirrhosis, mildly dilated pancreatic duct on ultrasound  4/30 - Repeat  EGD showed gastric varices grade 1.    Plan for EUS today to evaluate gastric varices.     /Fluids/Electrolytes:  MIVF: LR@ 100ml/hr   Monitor I/Os   Daily BMPs   Replace electrolytes prn     Heme:  #Acute on chronic anemia due to upper GI bleed  Hgb of 6 on admission   Received 8u prbc total  Most recent hgb was 6.7     ID:  Afebrile  Cultures no growth   Monitor for infection       Prophylaxis:  DVT: pneumatic compression devices  GI: Protonix infusion     Dispo:  Monitor in ICU       Ofe Shukla MD   Internal Medicine PGY1  5/5/2024 7:46 AM         Attending Physician Statement  I have discussed the care of Angela Ghotra, including pertinent history and exam findings with the resident. I have reviewed the key elements of all parts of the encounter with the resident. I have seen and examined the patient with the resident.  I agree with the assessment and plan and status of the problem list as documented.    Overnight and yesterday not 24 hours patient had multiple melanotic bowel movement and 1 episode of bleeding per rectum his hemoglobin dropped to 6.8 this morning and he is getting blood transfusion.  Recheck hemoglobin continue hemoglobin hematocrit every 8 hours.  Patient is on a clear liquid diet seen by GI and further plan per GI.  He is not hypotensive and he is on LR at 50 mill an hour.  Continue with Protonix drip.  Will continue to observe in ICU    Discussed with nursing staff, treatment and plan discussed.  Discussed with respiratory therapist.    Total critical care time caring for this patient with life threatening, unstable organ failure, including direct patient contact, management of life support systems, review of data including imaging and labs,

## 2024-05-06 ENCOUNTER — ANESTHESIA EVENT (OUTPATIENT)
Dept: OPERATING ROOM | Age: 54
End: 2024-05-06
Payer: COMMERCIAL

## 2024-05-06 ENCOUNTER — ANESTHESIA (OUTPATIENT)
Dept: OPERATING ROOM | Age: 54
End: 2024-05-06
Payer: COMMERCIAL

## 2024-05-06 LAB
ABO/RH: NORMAL
ANION GAP SERPL CALCULATED.3IONS-SCNC: 10 MMOL/L (ref 9–16)
ANTIBODY SCREEN: NEGATIVE
ARM BAND NUMBER: NORMAL
BLOOD BANK BLOOD PRODUCT EXPIRATION DATE: NORMAL
BLOOD BANK DISPENSE STATUS: NORMAL
BLOOD BANK ISBT PRODUCT BLOOD TYPE: 6200
BLOOD BANK PRODUCT CODE: NORMAL
BLOOD BANK SAMPLE EXPIRATION: NORMAL
BLOOD BANK UNIT TYPE AND RH: NORMAL
BPU ID: NORMAL
BUN SERPL-MCNC: 14 MG/DL (ref 6–20)
CALCIUM SERPL-MCNC: 8 MG/DL (ref 8.6–10.4)
CANCER AG19-9 SERPL IA-ACNC: 98 U/ML (ref 0–35)
CASE NUMBER:: NORMAL
CEA SERPL-MCNC: 0.6 NG/ML (ref 0–3.8)
CHLORIDE SERPL-SCNC: 107 MMOL/L (ref 98–107)
CO2 SERPL-SCNC: 20 MMOL/L (ref 20–31)
COMPONENT: NORMAL
CREAT SERPL-MCNC: 1.7 MG/DL (ref 0.7–1.2)
CROSSMATCH RESULT: NORMAL
ERYTHROCYTE [DISTWIDTH] IN BLOOD BY AUTOMATED COUNT: 14.9 % (ref 11.8–14.4)
GFR, ESTIMATED: 49 ML/MIN/1.73M2
GLUCOSE BLD-MCNC: 109 MG/DL (ref 75–110)
GLUCOSE SERPL-MCNC: 89 MG/DL (ref 74–99)
HCT VFR BLD AUTO: 25.2 % (ref 40.7–50.3)
HCT VFR BLD AUTO: 26.7 % (ref 40.7–50.3)
HCT VFR BLD AUTO: 27 % (ref 40.7–50.3)
HGB BLD-MCNC: 8.2 G/DL (ref 13–17)
HGB BLD-MCNC: 8.4 G/DL (ref 13–17)
HGB BLD-MCNC: 8.8 G/DL (ref 13–17)
MCH RBC QN AUTO: 28.1 PG (ref 25.2–33.5)
MCHC RBC AUTO-ENTMCNC: 31.1 G/DL (ref 28.4–34.8)
MCV RBC AUTO: 90.3 FL (ref 82.6–102.9)
NRBC BLD-RTO: 0 PER 100 WBC
PLATELET # BLD AUTO: 174 K/UL (ref 138–453)
PMV BLD AUTO: 10.4 FL (ref 8.1–13.5)
POTASSIUM SERPL-SCNC: 3.6 MMOL/L (ref 3.7–5.3)
RBC # BLD AUTO: 2.99 M/UL (ref 4.21–5.77)
SODIUM SERPL-SCNC: 137 MMOL/L (ref 136–145)
TRANSFUSION STATUS: NORMAL
UNIT DIVISION: 0
UNIT ISSUE DATE/TIME: NORMAL
WBC OTHER # BLD: 10 K/UL (ref 3.5–11.3)

## 2024-05-06 PROCEDURE — 6360000002 HC RX W HCPCS: Performed by: INTERNAL MEDICINE

## 2024-05-06 PROCEDURE — 3700000000 HC ANESTHESIA ATTENDED CARE: Performed by: INTERNAL MEDICINE

## 2024-05-06 PROCEDURE — 1200000000 HC SEMI PRIVATE

## 2024-05-06 PROCEDURE — 2709999900 HC NON-CHARGEABLE SUPPLY: Performed by: INTERNAL MEDICINE

## 2024-05-06 PROCEDURE — 0DJD8ZZ INSPECTION OF LOWER INTESTINAL TRACT, VIA NATURAL OR ARTIFICIAL OPENING ENDOSCOPIC: ICD-10-PCS | Performed by: INTERNAL MEDICINE

## 2024-05-06 PROCEDURE — 82947 ASSAY GLUCOSE BLOOD QUANT: CPT

## 2024-05-06 PROCEDURE — 85018 HEMOGLOBIN: CPT

## 2024-05-06 PROCEDURE — 7100000001 HC PACU RECOVERY - ADDTL 15 MIN: Performed by: INTERNAL MEDICINE

## 2024-05-06 PROCEDURE — 2580000003 HC RX 258: Performed by: INTERNAL MEDICINE

## 2024-05-06 PROCEDURE — 3609017100 HC EGD: Performed by: INTERNAL MEDICINE

## 2024-05-06 PROCEDURE — C9113 INJ PANTOPRAZOLE SODIUM, VIA: HCPCS | Performed by: INTERNAL MEDICINE

## 2024-05-06 PROCEDURE — 86301 IMMUNOASSAY TUMOR CA 19-9: CPT

## 2024-05-06 PROCEDURE — 99291 CRITICAL CARE FIRST HOUR: CPT | Performed by: INTERNAL MEDICINE

## 2024-05-06 PROCEDURE — 3700000001 HC ADD 15 MINUTES (ANESTHESIA): Performed by: INTERNAL MEDICINE

## 2024-05-06 PROCEDURE — 6370000000 HC RX 637 (ALT 250 FOR IP)

## 2024-05-06 PROCEDURE — 85027 COMPLETE CBC AUTOMATED: CPT

## 2024-05-06 PROCEDURE — 6360000002 HC RX W HCPCS

## 2024-05-06 PROCEDURE — 6360000002 HC RX W HCPCS: Performed by: NURSE ANESTHETIST, CERTIFIED REGISTERED

## 2024-05-06 PROCEDURE — 2580000003 HC RX 258: Performed by: NURSE ANESTHETIST, CERTIFIED REGISTERED

## 2024-05-06 PROCEDURE — 3609027000 HC COLONOSCOPY: Performed by: INTERNAL MEDICINE

## 2024-05-06 PROCEDURE — 43235 EGD DIAGNOSTIC BRUSH WASH: CPT | Performed by: INTERNAL MEDICINE

## 2024-05-06 PROCEDURE — 6370000000 HC RX 637 (ALT 250 FOR IP): Performed by: INTERNAL MEDICINE

## 2024-05-06 PROCEDURE — 0DJ08ZZ INSPECTION OF UPPER INTESTINAL TRACT, VIA NATURAL OR ARTIFICIAL OPENING ENDOSCOPIC: ICD-10-PCS | Performed by: INTERNAL MEDICINE

## 2024-05-06 PROCEDURE — 2580000003 HC RX 258

## 2024-05-06 PROCEDURE — 82378 CARCINOEMBRYONIC ANTIGEN: CPT

## 2024-05-06 PROCEDURE — 45378 DIAGNOSTIC COLONOSCOPY: CPT | Performed by: INTERNAL MEDICINE

## 2024-05-06 PROCEDURE — 2500000003 HC RX 250 WO HCPCS: Performed by: NURSE ANESTHETIST, CERTIFIED REGISTERED

## 2024-05-06 PROCEDURE — 85014 HEMATOCRIT: CPT

## 2024-05-06 PROCEDURE — 80048 BASIC METABOLIC PNL TOTAL CA: CPT

## 2024-05-06 PROCEDURE — 36415 COLL VENOUS BLD VENIPUNCTURE: CPT

## 2024-05-06 PROCEDURE — C9113 INJ PANTOPRAZOLE SODIUM, VIA: HCPCS

## 2024-05-06 PROCEDURE — 7100000000 HC PACU RECOVERY - FIRST 15 MIN: Performed by: INTERNAL MEDICINE

## 2024-05-06 RX ORDER — FENTANYL CITRATE 50 UG/ML
50 INJECTION, SOLUTION INTRAMUSCULAR; INTRAVENOUS EVERY 5 MIN PRN
Status: DISCONTINUED | OUTPATIENT
Start: 2024-05-06 | End: 2024-05-06 | Stop reason: HOSPADM

## 2024-05-06 RX ORDER — LIDOCAINE HYDROCHLORIDE 10 MG/ML
INJECTION, SOLUTION EPIDURAL; INFILTRATION; INTRACAUDAL; PERINEURAL PRN
Status: DISCONTINUED | OUTPATIENT
Start: 2024-05-06 | End: 2024-05-06 | Stop reason: SDUPTHER

## 2024-05-06 RX ORDER — SODIUM CHLORIDE 9 MG/ML
INJECTION, SOLUTION INTRAVENOUS PRN
Status: DISCONTINUED | OUTPATIENT
Start: 2024-05-06 | End: 2024-05-06 | Stop reason: HOSPADM

## 2024-05-06 RX ORDER — SODIUM CHLORIDE 0.9 % (FLUSH) 0.9 %
5-40 SYRINGE (ML) INJECTION EVERY 12 HOURS SCHEDULED
Status: DISCONTINUED | OUTPATIENT
Start: 2024-05-06 | End: 2024-05-06 | Stop reason: HOSPADM

## 2024-05-06 RX ORDER — POTASSIUM CHLORIDE 20 MEQ/1
40 TABLET, EXTENDED RELEASE ORAL ONCE
Status: COMPLETED | OUTPATIENT
Start: 2024-05-06 | End: 2024-05-06

## 2024-05-06 RX ORDER — ONDANSETRON 2 MG/ML
4 INJECTION INTRAMUSCULAR; INTRAVENOUS
Status: DISCONTINUED | OUTPATIENT
Start: 2024-05-06 | End: 2024-05-06 | Stop reason: HOSPADM

## 2024-05-06 RX ORDER — FENTANYL CITRATE 50 UG/ML
25 INJECTION, SOLUTION INTRAMUSCULAR; INTRAVENOUS EVERY 5 MIN PRN
Status: DISCONTINUED | OUTPATIENT
Start: 2024-05-06 | End: 2024-05-06 | Stop reason: HOSPADM

## 2024-05-06 RX ORDER — PROPOFOL 10 MG/ML
INJECTION, EMULSION INTRAVENOUS PRN
Status: DISCONTINUED | OUTPATIENT
Start: 2024-05-06 | End: 2024-05-06 | Stop reason: SDUPTHER

## 2024-05-06 RX ORDER — NALOXONE HYDROCHLORIDE 0.4 MG/ML
INJECTION, SOLUTION INTRAMUSCULAR; INTRAVENOUS; SUBCUTANEOUS PRN
Status: DISCONTINUED | OUTPATIENT
Start: 2024-05-06 | End: 2024-05-06 | Stop reason: HOSPADM

## 2024-05-06 RX ORDER — SODIUM CHLORIDE, SODIUM LACTATE, POTASSIUM CHLORIDE, CALCIUM CHLORIDE 600; 310; 30; 20 MG/100ML; MG/100ML; MG/100ML; MG/100ML
INJECTION, SOLUTION INTRAVENOUS CONTINUOUS PRN
Status: DISCONTINUED | OUTPATIENT
Start: 2024-05-06 | End: 2024-05-06 | Stop reason: SDUPTHER

## 2024-05-06 RX ORDER — SODIUM CHLORIDE 0.9 % (FLUSH) 0.9 %
5-40 SYRINGE (ML) INJECTION PRN
Status: DISCONTINUED | OUTPATIENT
Start: 2024-05-06 | End: 2024-05-06 | Stop reason: HOSPADM

## 2024-05-06 RX ADMIN — SUCRALFATE 1 G: 1 TABLET ORAL at 19:51

## 2024-05-06 RX ADMIN — SODIUM CHLORIDE, POTASSIUM CHLORIDE, SODIUM LACTATE AND CALCIUM CHLORIDE: 600; 310; 30; 20 INJECTION, SOLUTION INTRAVENOUS at 18:02

## 2024-05-06 RX ADMIN — PROPOFOL 125 MCG/KG/MIN: 10 INJECTION, EMULSION INTRAVENOUS at 09:05

## 2024-05-06 RX ADMIN — SODIUM CHLORIDE, POTASSIUM CHLORIDE, SODIUM LACTATE AND CALCIUM CHLORIDE: 600; 310; 30; 20 INJECTION, SOLUTION INTRAVENOUS at 01:16

## 2024-05-06 RX ADMIN — PANTOPRAZOLE SODIUM 8 MG/HR: 40 INJECTION, POWDER, FOR SOLUTION INTRAVENOUS at 01:15

## 2024-05-06 RX ADMIN — ATORVASTATIN CALCIUM 40 MG: 40 TABLET, FILM COATED ORAL at 19:51

## 2024-05-06 RX ADMIN — PROPOFOL 100 MG: 10 INJECTION, EMULSION INTRAVENOUS at 08:59

## 2024-05-06 RX ADMIN — SODIUM CHLORIDE, POTASSIUM CHLORIDE, SODIUM LACTATE AND CALCIUM CHLORIDE: 600; 310; 30; 20 INJECTION, SOLUTION INTRAVENOUS at 08:54

## 2024-05-06 RX ADMIN — SUCRALFATE 1 G: 1 TABLET ORAL at 16:52

## 2024-05-06 RX ADMIN — PANTOPRAZOLE SODIUM 8 MG/HR: 40 INJECTION, POWDER, FOR SOLUTION INTRAVENOUS at 18:01

## 2024-05-06 RX ADMIN — LIDOCAINE HYDROCHLORIDE 50 MG: 10 INJECTION, SOLUTION EPIDURAL; INFILTRATION; INTRACAUDAL; PERINEURAL at 08:57

## 2024-05-06 RX ADMIN — SUCRALFATE 1 G: 1 TABLET ORAL at 13:51

## 2024-05-06 RX ADMIN — PANTOPRAZOLE SODIUM 8 MG/HR: 40 INJECTION, POWDER, FOR SOLUTION INTRAVENOUS at 10:32

## 2024-05-06 RX ADMIN — POTASSIUM CHLORIDE 40 MEQ: 1500 TABLET, EXTENDED RELEASE ORAL at 05:53

## 2024-05-06 RX ADMIN — SODIUM CHLORIDE, PRESERVATIVE FREE 10 ML: 5 INJECTION INTRAVENOUS at 19:51

## 2024-05-06 ASSESSMENT — PAIN - FUNCTIONAL ASSESSMENT
PAIN_FUNCTIONAL_ASSESSMENT: FACE, LEGS, ACTIVITY, CRY, AND CONSOLABILITY (FLACC)
PAIN_FUNCTIONAL_ASSESSMENT: 0-10

## 2024-05-06 ASSESSMENT — PAIN SCALES - GENERAL: PAINLEVEL_OUTOF10: 0

## 2024-05-06 NOTE — PROGRESS NOTES
Critical care team - Resident sign-out to medicine service      Date and time: 5/6/2024 3:29 PM  Patient's name:  Angela Ghotra  Medical Record Number: [id number]  Patient's account/billing number: [id number]  Patient's YOB: 1970  Age: 54 y.o.  Date of Admission: 4/27/2024  7:41 PM  Length of stay during current admission: 9    Primary Care Physician: No primary care provider on file.    Code Status: Full Code    Mode of physician to physician communication:        [x] Via telephone   [] In person     Date and time of sign-out: 5/6/2024 3:29 PM    Accepting Internal Medicine resident: Dr. Marilee Bautista    Accepting Medicine team: IM Team Intermed    Accepting team's attending: Dr. Marilee Bautista    Patient's current ICU Bed:  3021     Patient's assigned bed on floor:  512        [x] Med-Surg Monitored [] Step-down       [] Psychiatry ICU       [] Psych floor     Reason for ICU admission:     Massive GI bleed    ICU course summary:     Angela Ghotra is a 54 years old male with a past medical history of CKD, HTN, Alcohol use disorder, gastric varices and portal HTN presented to the ED with the chief complaint of numerous episodes of hematemesis and melena. He had multiple episodes of hematemesis in the ED as well.  At the time of the presentation, his hemoglobin was 6 and he received 3 units of PRBC.  CT of the abdomen and pelvis was done which showed no active GI bleed.  Critical care was consulted by the GI in the PACU.  He underwent EGD on that day which showed large amount of blood in the stomach which limited visualization.  He was started on IV Protonix and octreotide drip.    He underwent repeat EGD on 4/30/2024 which showed grade 1 gastric varices.  At that time, GI ordered Doppler ultrasound of the spleen to rule out splenic vein thrombosis.  He underwent Doppler ultrasound of the spleen which did not show any splenic vein thrombosis.  He again underwent EGD with EUS on 5/1/2024 which  showed focal thickening of gastric submucosa.  However, no gastric varices were seen.  Biopsies were obtained to rule out lymphoma.  He continues to be on IV Protonix drip meanwhile.  He underwent another EGD with EUS on 5/3/2024 and biopsy was obtained of the current gastric submucosa along the greater curvature and another biopsy was obtained from the hypoechoic lesion near the tail of pancreas.  However, patient continued to have episodes of dark stools.  He underwent another EGD and colonoscopy today.  EGD again shows thickened gastric folds with friable mucosa which can bleed easily.  He underwent colonoscopy which showed hemorrhoids without any active signs of bleeding.    GI recommended to continue with IV Protonix.  They also recommended a follow-up biopsy taken on the last procedures.  Patient is stable to be transferred out from the ICU for further management on the floor.    Procedures during patient's ICU stay:     -EGD  -Colonoscopy    Current Vitals:     BP (!) 165/80   Pulse 76   Temp 98.2 °F (36.8 °C) (Oral)   Resp 19   Ht 1.702 m (5' 7.01\")   Wt 60.3 kg (132 lb 15 oz)   SpO2 97%   BMI 20.82 kg/m²       Cultures:     Blood cultures:                 [] None drawn      [x] Negative             []  Positive (Details:  )  Urine Culture:                   [] None drawn      [x] Negative             []  Positive (Details:  )  Sputum Culture:               [x] None drawn       [] Negative             []  Positive (Details:  )   Endotracheal aspirate:     [x] None drawn       [] Negative             []  Positive (Details:  )       Consults:     1. GI    Assessment:     Patient Active Problem List    Diagnosis Date Noted    Chest pain 02/26/2023    Hematemesis 04/28/2024    Melena 04/28/2024    Gastritis 04/28/2024    Epigastric pain 04/28/2024    Blood loss anemia 04/28/2024    Elevated troponin 04/28/2024    Hypokalemia 04/28/2024    CKD (chronic kidney disease) 04/28/2024    Alcohol use 04/08/2024     Floresitaki's ring 04/08/2024    NSTEMI (non-ST elevated myocardial infarction) (HCC) 04/05/2024    GI bleed 04/04/2024       Additional assessment:    Gastroenterology    Recommended Follow-up:     -Follow-up on GI recommendations  -Continue IV Protonix.  -Can resume diet and advance as tolerated  -Monitor H&H.  Transfuse if Hb less than 7 g/dL          Above mentioned assessment and plan was discussed by me with the admitting medicine resident. The medicine team assigned to the patient by medicine admitting resident will be following up the patient from now onwards on the floor.     Bert Pelaez MD,   Critical care resident  Department of Internal Medicine/ Critical care  [facility])             5/6/2024, 3:29 PM

## 2024-05-06 NOTE — ANESTHESIA POSTPROCEDURE EVALUATION
Department of Anesthesiology  Postprocedure Note    Patient: Angela Ghotra  MRN: [id number]  YOB: 1970  Date of evaluation: 5/6/2024    Procedure Summary       Date: 05/06/24 Room / Location: [address]    Anesthesia Start: 0854 Anesthesia Stop: 0929    Procedures:       ESOPHAGOGASTRODUODENOSCOPY      COLONOSCOPY DIAGNOSTIC Diagnosis:       Gastrointestinal hemorrhage, unspecified gastrointestinal hemorrhage type      (Gastrointestinal hemorrhage, unspecified gastrointestinal hemorrhage type [K92.2])    Surgeons: Jose Raul Cuevas MD Responsible Provider: Gilberto Clancy MD    Anesthesia Type: MAC ASA Status: 3            Anesthesia Type: MAC    Brennon Phase I: Brennon Score: 8    Brennon Phase II:    POST-OP ANESTHESIA NOTE       BP (!) 138/93   Pulse 77   Temp 97.4 °F (36.3 °C)   Resp 16   Ht 1.702 m (5' 7.01\")   Wt 60.3 kg (132 lb 15 oz)   SpO2 99%   BMI 20.82 kg/m²    Pain Assessment: None - Denies Pain  Pain Level: 0        Anesthesia Post Evaluation    Patient location during evaluation: PACU  Patient participation: complete - patient participated  Level of consciousness: awake  Pain score: 0  Airway patency: patent  Nausea & Vomiting: no vomiting and no nausea  Cardiovascular status: hemodynamically stable  Respiratory status: acceptable  Hydration status: stable  Pain management: adequate        No notable events documented.

## 2024-05-06 NOTE — PROGRESS NOTES
INTENSIVE CARE UNIT  Resident Physician Progress Note    Patient - Angela Ghotra  Date of Admission -  4/27/2024  7:41 PM  Date of Evaluation -  5/6/2024  Room and Bed Number -  3027/3027-01   Hospital Day - 9      SUBJECTIVE:     OVERNIGHT EVENTS:      No acute event overnight.    TODAY:  --Neurologically intact. Patient denies any lethargy, lightheadedness, or weakness  -- He remained afebrile and hemodynamically stable overnight.  On Toprol-XL 25 daily.  -- He is saturating well on the room air.  -- Tolerating FLD, On Carafate and Protonix gtt  --BM x3 overnight, no episode of melena reported overnight.  His H&H remained stable.  Last Hb 8.4 in the morning.  --On LR at 100 mL/h  --UOP 1.9 L over the past 24 hour.  --> + 15.7 L since admission  -- BMP today shows sodium 137, potassium 3.6, chloride 107, bicarbonate 20, BUN 41 creatinine 1.7.  -- His CBC today shows WBC 10, hemoglobin 8.4, hematocrit 27.  Total 174.  --Glucose 89  --DVT PPx: Pneumatic compression device    F.A.S.T. M. H.U.G.S. B.I.D.    Feeding Diet: Diet NPO Exceptions are: Sips of Water with Meds  Fluids: LR at 50 mL/h  Family: No  Analgesic: No sedation  Sedation: No sedation  Thrombo-prophylaxis: [] Enoxaparin, [] Unfract. Heparin Subcutaneously, [x] EPC Cuffs  Mobility: Up as tolerated  Heads up: 30 degrees  Ulcer prophylaxis: [] PPI Agent, [] S6Hvyys, [] Sucralfate, [x] Other: Protonix drip  Glycemic control: Good  Spontaneous breathing trial: Not indicated  Bowel regimen/urine output: None  Indwelling catheter/lines: 3 Peripheral IVs  De-escalation: Not yet    Speciality Recommendations:   GI- continue on protonix gtt, do not advance diet more than full liquids due to risk of bleeding    Plan:   - EGD and colonoscopy today  -Monitor H&H  -Transfuse if Hb less than 7 g/dL  -Continue IV Protonix drip    Interval events:   4/30: EGD which showed small gastric varices with portal hypertensive gastropathy.    5/1:  EGD/EUS showed focal thickening           Urinalysis:   Lab Results   Component Value Date/Time    NITRU NEGATIVE 04/04/2024 08:14 PM    COLORU Yellow 04/04/2024 08:14 PM    PHUR 7.5 04/04/2024 08:14 PM    LEUKOCYTESUR NEGATIVE 04/04/2024 08:14 PM    UROBILINOGEN Normal 04/04/2024 08:14 PM    BILIRUBINUR NEGATIVE 04/04/2024 08:14 PM    GLUCOSEU NEGATIVE 04/04/2024 08:14 PM    KETUA TRACE 04/04/2024 08:14 PM       HgBA1c:  No results found for: \"LABA1C\"  TSH:    Lab Results   Component Value Date/Time    TSH 6.06 04/04/2024 06:06 PM     Lactic Acid: No results found for: \"LACTA\"   Troponin: No results for input(s): \"TROPONINI\" in the last 72 hours.  Radiology/Imaging:  CTA ABDOMEN PELVIS W CONTRAST   Preliminary Result   1. No evidence of acute arterial hemorrhage within the bowel.   2. Stable appearance of focal hypodensity along the tail of the pancreas with   trace adjacent fluid.  The differential includes inflammatory change and a   hypoenhancing mass.  Further evaluation with pancreas protocol MRI or   follow-up contrast-enhanced CT of the abdomen and pelvis is recommended on a   nonemergent basis.   3. Small left pleural effusion.   4. Mild splenomegaly.  Small focus of hypoenhancement along the   posterolateral spleen could represent a small infarct.  The splenic vein is   not visualized at the level of the splenic hilum, with thrombosis of the   peripheral aspect of the splenic vein not excluded.   5. Scattered fluid throughout the small bowel without evidence of   obstruction.  This finding is nonspecific but can be seen with   gastroenteritis.         US GI ENDOSCOPIC S&I   Final Result      US SPLEEN   Final Result   Unremarkable ultrasound of the spleen.  No findings of splenic vein   thrombosis.         US LIVER   Final Result   1. Portal vein is patent with hepatopetal flow.  No convincing evidence of   cirrhosis by sonographic technique.   2. Mildly dilated pancreatic duct.         CTA ABDOMEN PELVIS W WO CONTRAST   Final Result   1.  bleeding, there was a large amount of blood and blood clots in the stomach   S/p 8 units prbc   On protonix  5/1: EGD/EUS showed focal thickening of the gastric submucosa.  Biopsies obtained to rule out lymphoma.  No gastric varices present  Splenic ultrasound unremarkable. No liver cirrhosis, mildly dilated pancreatic duct on ultrasound  4/30 - Repeat  EGD showed gastric varices grade 1.    Underwent EGD and Colonoscopy on 5/6/24 which showed broad area in the upper body and fundus of the stomach with thickened folds with friable mucosa which can ooze blood easily. GI is mentioning that it could be Meniere's disease vs Portal HTN. Colonoscopy showed hemorrhoids but no active bleeding.   F/U on Gastric biopsy     /Fluids/Electrolytes:  MIVF: LR@ 100ml/hr   Monitor I/Os   Daily BMPs   Replace electrolytes prn     Heme:  #Acute on chronic anemia due to upper GI bleed  Hgb of 6 on admission   Received 8u prbc total  Most recent hgb was 6.7     ID:  Afebrile  Cultures no growth   Monitor for infection       Prophylaxis:  DVT: pneumatic compression devices  GI: Protonix infusion     Dispo:  Monitor in ICU       Bert Pelaez MD   Internal Medicine PGY1  Critical Care Service  5/6/2024 7:38 AM

## 2024-05-06 NOTE — CARE COORDINATION
Transitional planning. OR today for Colonoscopy and EGD,  A& O, RA, H & H Q 8hrs. From [facility], will have to wait for bed to return there. May be able to stay w/ his child's mother when dc'd. SW following.     [address] called, She called [facility] pt can stay when dc'd if bed is available, otherwise he will be placed first on waiting list. Informed pt.

## 2024-05-06 NOTE — PLAN OF CARE
Problem: Discharge Planning  Goal: Discharge to home or other facility with appropriate resources  5/6/2024 0225 by Jasmin Reyes RN  Outcome: Progressing  5/5/2024 1547 by Ashley Ryan RN  Outcome: Progressing     Problem: Safety - Adult  Goal: Free from fall injury  5/6/2024 0225 by Jasmin Reyes RN  Outcome: Progressing  5/5/2024 1547 by Ashley Ryan RN  Outcome: Progressing     Problem: ABCDS Injury Assessment  Goal: Absence of physical injury  5/6/2024 0225 by Jasmin Reyes RN  Outcome: Progressing  5/5/2024 1547 by Ashley Ryan RN  Outcome: Progressing  Flowsheets (Taken 5/5/2024 0800)  Absence of Physical Injury: Implement safety measures based on patient assessment     Problem: Pain  Goal: Verbalizes/displays adequate comfort level or baseline comfort level  5/6/2024 0225 by Jasmin Reyes RN  Outcome: Progressing  5/5/2024 1547 by Ashley Ryan RN  Outcome: Progressing     Problem: Skin/Tissue Integrity  Goal: Absence of new skin breakdown  Description: 1.  Monitor for areas of redness and/or skin breakdown  2.  Assess vascular access sites hourly  3.  Every 4-6 hours minimum:  Change oxygen saturation probe site  4.  Every 4-6 hours:  If on nasal continuous positive airway pressure, respiratory therapy assess nares and determine need for appliance change or resting period.  5/6/2024 0225 by Jasmin Reyes RN  Outcome: Progressing  5/5/2024 1547 by Ashley Ryan RN  Outcome: Progressing     Problem: Gastrointestinal - Adult  Goal: Minimal or absence of nausea and vomiting  5/6/2024 0225 by Jasmin Reyes RN  Outcome: Progressing  5/5/2024 1547 by Ashley Ryan RN  Outcome: Progressing  Goal: Maintains or returns to baseline bowel function  5/6/2024 0225 by Jasmin Reyes RN  Outcome: Progressing  5/5/2024 1547 by Ashley Ryan RN  Outcome: Progressing  Goal: Maintains adequate nutritional intake  5/6/2024 0225 by Jasmin Reyes RN  Outcome:  Progressing  5/5/2024 1547 by Ashley Ryan RN  Outcome: Progressing     Problem: Metabolic/Fluid and Electrolytes - Adult  Goal: Electrolytes maintained within normal limits  5/6/2024 0225 by Jasmin Reyes RN  Outcome: Progressing  5/5/2024 1547 by Ashley Ryan RN  Outcome: Progressing  Goal: Hemodynamic stability and optimal renal function maintained  5/6/2024 0225 by Jasmin Reyes RN  Outcome: Progressing  5/5/2024 1547 by Ashley Ryan RN  Outcome: Progressing  Goal: Glucose maintained within prescribed range  5/6/2024 0225 by Jasmin Reyes RN  Outcome: Progressing  5/5/2024 1547 by Ashley Ryan RN  Outcome: Progressing     Problem: Nutrition Deficit:  Goal: Optimize nutritional status  5/6/2024 0225 by Jasmin Reyes RN  Outcome: Progressing  5/5/2024 1547 by Ashley Ryan RN  Outcome: Progressing

## 2024-05-06 NOTE — OP NOTE
PROCEDURE NOTE    DATE OF PROCEDURE: 5/6/2024    SURGEON: Jose Raul Cuevas MD  Facility : [facility]  ASSISTANT: None  Anesthesia: MAC  PREOPERATIVE DIAGNOSIS:   Right blood per rectum  Drop in hemoglobin    POSTOPERATIVE DIAGNOSIS: as described below    OPERATION: Total colonoscopy     ANESTHESIA: Moderate Sedation    ESTIMATED BLOOD LOSS: less than 50     COMPLICATIONS: None.     SPECIMENS:  Was Not Obtained    HISTORY: The patient is a 54 y.o. year old male with history of above preop diagnosis.  I recommended colonoscopy with possible biopsy or polypectomy and I explained the risk, benefits, expected outcome, and alternatives to the procedure.  Risks included but are not limited to bleeding, infection, respiratory distress, hypotension, and perforation of the colon and possibility of missing a lesion.  The patient understands and is in agreement.        The patient was counseled at length about the risks of susy Covid-19 during their perioperative period and any recovery window from their procedure.  The patient was made aware that susy Covid-19  may worsen their prognosis for recovering from their procedure  and lend to a higher morbidity and/or mortality risk.  All material risks, benefits, and reasonable alternatives including postponing the procedure were discussed. The patient does wish to proceed with the procedure at this time.       PROCEDURE: The patient was given IV conscious sedation.  The patient's SPO2 remained above 90% throughout the procedure.     The colonoscope was inserted per rectum and advanced under direct vision to the cecum without difficulty.      Post sedation note :The patient's SPO2 remained above 90% throughout the procedure.the vital signs remained stable , and no immediate complication form the procedure noted, patient will be ready for d/c when criteria is met .        The prep was fair.      Findings:  Terminal ileum: normal for the less than  centimeter    Cecum/Ascending colon: normal    Transverse colon: normal    Descending/Sigmoid colon: normal    Rectum/Anus: examined in normal and retroflexed positions and was abnormal: Hemorrhoids    No source of bleeding was seen on this colonoscopy    Withdrawal Time was (minutes): 10    The colon was decompressed and the scope was removed.  The patient tolerated the procedure well.     Recommendations/Plan:   Please see EGD report    Electronically signed by Jose Raul Cuevas MD  on 5/6/2024 at 9:36 AM

## 2024-05-06 NOTE — PLAN OF CARE
Problem: Discharge Planning  Goal: Discharge to home or other facility with appropriate resources  5/6/2024 1358 by Armani Lemon RN  Outcome: Progressing  5/6/2024 0225 by Jasmin Reyes RN  Outcome: Progressing     Problem: Safety - Adult  Goal: Free from fall injury  5/6/2024 1358 by Armani Lemon RN  Outcome: Progressing  5/6/2024 0225 by Jasmin Reyes RN  Outcome: Progressing     Problem: ABCDS Injury Assessment  Goal: Absence of physical injury  5/6/2024 1358 by Armani Lemon RN  Outcome: Progressing  5/6/2024 0225 by Jasmin Reyes RN  Outcome: Progressing     Problem: Pain  Goal: Verbalizes/displays adequate comfort level or baseline comfort level  5/6/2024 1358 by Armani Lemon RN  Outcome: Progressing  5/6/2024 0225 by Jasmin Reyes RN  Outcome: Progressing     Problem: Skin/Tissue Integrity  Goal: Absence of new skin breakdown  Description: 1.  Monitor for areas of redness and/or skin breakdown  2.  Assess vascular access sites hourly  3.  Every 4-6 hours minimum:  Change oxygen saturation probe site  4.  Every 4-6 hours:  If on nasal continuous positive airway pressure, respiratory therapy assess nares and determine need for appliance change or resting period.  5/6/2024 1358 by Armani Lemon RN  Outcome: Progressing  5/6/2024 0225 by Jasmin Reyes RN  Outcome: Progressing     Problem: Gastrointestinal - Adult  Goal: Minimal or absence of nausea and vomiting  5/6/2024 0225 by Jasmin Reyes RN  Outcome: Progressing  Goal: Maintains or returns to baseline bowel function  5/6/2024 0225 by Jasmin Reyes RN  Outcome: Progressing  Goal: Maintains adequate nutritional intake  5/6/2024 0225 by Jasmin Reyes RN  Outcome: Progressing     Problem: Metabolic/Fluid and Electrolytes - Adult  Goal: Electrolytes maintained within normal limits  5/6/2024 0225 by Jasmin Reyes RN  Outcome: Progressing  Goal: Hemodynamic stability and optimal renal function maintained  5/6/2024 0225  by Jasmin Reyes, RN  Outcome: Progressing  Goal: Glucose maintained within prescribed range  5/6/2024 0225 by Jasmin Reyes, RN  Outcome: Progressing     Problem: Nutrition Deficit:  Goal: Optimize nutritional status  5/6/2024 0225 by Jasmin Reyes, RN  Outcome: Progressing

## 2024-05-06 NOTE — OP NOTE
PROCEDURE NOTE    DATE OF PROCEDURE: 5/6/2024     SURGEON: Jose Raul Cuevas MD  Facility: [facility]  ASSISTANT: None  Anesthesia: MAC  PREOPERATIVE DIAGNOSIS:   Patient continued to drop his hemoglobin and had bright blood per rectum  Had multiple EGDs please refer to to the results    Diagnosis:  Schatzki's ring  No esophageal varices    Patient had an broad area located in the upper body and the fundus of the stomach with scaly skin appearance of the mucosa, but very thickened folds, couple areas look like gastric varices, but knowing that this patient had EUS and varices was not seen, this seems to be more of thickening edematous folds with very friable mucosa very easy to ooze blood, possibility of Ménière's disease versus portal hypertension changes, other possibilities of infiltrative disease in that area, biopsies were taken last visit and caused bleeding, for which I did not biopsy at this exam, will see the biopsy from the last exam they are still pending    There is no fresh or old blood right now in the stomach to indicate active bleeding at this point but the area described above is very friable and easy to ooze blood.    Normal duodenum    POSTOPERATIVE DIAGNOSIS: As described below    OPERATION: Upper GI endoscopy with Biopsy    ANESTHESIA: Moderate Sedation     ESTIMATED BLOOD LOSS: Less than 50 ml    COMPLICATIONS: None.     SPECIMENS:  Was Not Obtained    HISTORY: The patient is a 54 y.o. year old male with history of above preop diagnosis.  I recommended esophagogastroduodenoscopy with possible biopsy and I explained the risk, benefits, expected outcome, and alternatives to the procedure.  Risks included but are not limited to bleeding, infection, respiratory distress, hypotension, and perforation of the esophagus, stomach, or duodenum.  Patient understands and is in agreement.      The patient was counseled at length about the risks of susy Covid-19 during their perioperative period and any  recovery window from their procedure.  The patient was made aware that susy Covid-19  may worsen their prognosis for recovering from their procedure  and lend to a higher morbidity and/or mortality risk.  All material risks, benefits, and reasonable alternatives including postponing the procedure were discussed. The patient does wish to proceed with the procedure at this time.         PROCEDURE: The patient was given IV conscious sedation.  The patient's SPO2 remained above 90% throughout the procedure.The gastroscope was inserted orally and advanced under direct vision through the esophagus, through the stomach, through the pylorus, and into the descending duodenum.      Post sedation note :The patient's SPO2 remained above 90% throughout the procedure.the vital signs remained stable , and no immediate complication form the procedure noted, patient will be ready for d/c when criteria is met .      Findings:    Retropharyngeal area was grossly normal appearing  Schatzki's ring  No esophageal varices    Patient had an broad area located in the upper body and the fundus of the stomach with scaly skin appearance of the mucosa, but very thickened folds, couple areas look like gastric varices, but knowing that this patient had EUS and varices was not seen, this seems to be more of thickening edematous folds with very friable mucosa very easy to ooze blood, possibility of Ménière's disease versus portal hypertension changes, other possibilities of infiltrative disease in that area, biopsies were taken last visit and caused bleeding, for which I did not biopsy at this exam, will see the biopsy from the last exam they are still pending    There is no fresh or old blood right now in the stomach to indicate active bleeding at this point but the area described above is very friable and easy to ooze blood.    Normal duodenum    The scope was removed and the patient tolerated the procedure well.     Recommendations/Plan:    F/U Biopsies taken last exam  Colonoscopy today    Electronically signed by Jose Raul Cuevas MD  on 5/6/2024 at 9:29 AM

## 2024-05-06 NOTE — ANESTHESIA PRE PROCEDURE
Department of Anesthesiology  Preprocedure Note       Name:  Angela Ghotra   Age:  54 y.o.  :  1970                                          MRN:  [id number]         Date:  2024      Surgeon: Surgeon(s):  Jose Raul Cuevas MD    Procedure: Procedure(s):  ESOPHAGOGASTRODUODENOSCOPY  COLONOSCOPY DIAGNOSTIC    Medications prior to admission:   Prior to Admission medications    Medication Sig Start Date End Date Taking? Authorizing Provider   atorvastatin (LIPITOR) 40 MG tablet Take 1 tablet by mouth nightly 24   Marilee Bautista MD   metoprolol succinate (TOPROL XL) 25 MG extended release tablet Take 1 tablet by mouth daily 24   Marilee Bautista MD   thiamine 100 MG tablet Take 1 tablet by mouth daily 24   Marilee Bautista MD   folic acid (FOLVITE) 1 MG tablet Take 1 tablet by mouth daily 24   Marilee Bautista MD   Multiple Vitamins-Minerals (MULTIVITAMIN WITH MINERALS) tablet Take 1 tablet by mouth daily 24   Marilee Bautista MD   pantoprazole (PROTONIX) 40 MG tablet Take 1 tablet by mouth 2 times daily (before meals) 24   Marilee Bautista MD   sucralfate (CARAFATE) 1 GM tablet Take 1 tablet by mouth 4 times daily 24   Marilee Bautista MD       Current medications:    Current Facility-Administered Medications   Medication Dose Route Frequency Provider Last Rate Last Admin    pantoprazole (PROTONIX) 80 mg in sodium chloride 0.9 % 100 mL infusion  8 mg/hr IntraVENous Continuous Filiberto Fonseca MD 10 mL/hr at 24 0412 8 mg/hr at 24 0412    glucose chewable tablet 16 g  4 tablet Oral PRN Filiberto Fonseca MD        dextrose bolus 10% 125 mL  125 mL IntraVENous PRFiliberto Gracia  mL/hr at 24 0002 Rate Verify at 24 0002    Or    dextrose bolus 10% 250 mL  250 mL IntraVENous PRN Filiberto Fonseca MD        glucagon injection 1 mg  1 mg SubCUTAneous PRFiliberto Gracia MD        dextrose 10 % infusion   IntraVENous Continuous

## 2024-05-06 NOTE — CARE COORDINATION
Called [facility] to check status of his appeal that was faxed last week.  Spoke with Katie, shelter coordinator. She stated pt is able to return to the shelter, pending bed available at time of discharge.  She stated to call [facility] once discharge is known and if they do not have a bed, he will be first on the waiting list. Advised by CM that pt plans to call his children's mother later today to see if he can stay with her, if needed, until a bed would open up at [facility].  SW will contact [facility] at discharge to check bed availability.      Met with pt and updated on above.

## 2024-05-06 NOTE — PLAN OF CARE
Problem: Discharge Planning  Goal: Discharge to home or other facility with appropriate resources  5/6/2024 1823 by Bing Moon RN  Outcome: Progressing  5/6/2024 1358 by Armani Lemon RN  Outcome: Progressing     Problem: Safety - Adult  Goal: Free from fall injury  5/6/2024 1823 by Bing Moon RN  Outcome: Progressing  5/6/2024 1358 by Armani Lemon RN  Outcome: Progressing     Problem: ABCDS Injury Assessment  Goal: Absence of physical injury  5/6/2024 1823 by Bing Moon RN  Outcome: Progressing  5/6/2024 1358 by Armani Lemon RN  Outcome: Progressing     Problem: Pain  Goal: Verbalizes/displays adequate comfort level or baseline comfort level  5/6/2024 1823 by Bing Moon RN  Outcome: Progressing  5/6/2024 1358 by Armani Lemon RN  Outcome: Progressing     Problem: Skin/Tissue Integrity  Goal: Absence of new skin breakdown  Description: 1.  Monitor for areas of redness and/or skin breakdown  2.  Assess vascular access sites hourly  3.  Every 4-6 hours minimum:  Change oxygen saturation probe site  4.  Every 4-6 hours:  If on nasal continuous positive airway pressure, respiratory therapy assess nares and determine need for appliance change or resting period.  5/6/2024 1823 by Bing Moon RN  Outcome: Progressing  5/6/2024 1358 by Armani Lemon RN  Outcome: Progressing     Problem: Gastrointestinal - Adult  Goal: Minimal or absence of nausea and vomiting  Outcome: Progressing  Goal: Maintains or returns to baseline bowel function  Outcome: Progressing  Goal: Maintains adequate nutritional intake  Outcome: Progressing     Problem: Metabolic/Fluid and Electrolytes - Adult  Goal: Electrolytes maintained within normal limits  Outcome: Progressing  Goal: Hemodynamic stability and optimal renal function maintained  Outcome: Progressing  Goal: Glucose maintained within prescribed range  Outcome: Progressing     Problem: Nutrition Deficit:  Goal: Optimize nutritional status  Outcome:

## 2024-05-06 NOTE — ANESTHESIA POSTPROCEDURE EVALUATION
Department of Anesthesiology  Postprocedure Note    Patient: Angela Ghotra  MRN: [id number]  YOB: 1970  Date of evaluation: 5/6/2024    Procedure Summary       Date: 05/03/24 Room / Location: [address]    Anesthesia Start: 1455 Anesthesia Stop: 1550    Procedures:       ESOPHAGOGASTRODUODENOSCOPY ENDOSCOPIC ULTRASOUND FINE NEEDLE ASPIRATION      ESOPHAGOGASTRODUODENOSCOPY BIOPSY      ESOPHAGOGASTRODUODENOSCOPY CONTROL HEMORRHAGE Diagnosis:       Gastrointestinal hemorrhage, unspecified gastrointestinal hemorrhage type      Pancreatic mass      (Gastrointestinal hemorrhage, unspecified gastrointestinal hemorrhage type [K92.2])      (Pancreatic mass [K86.89])    Surgeons: Troy Rubio MD Responsible Provider: Tiffany Aguiar MD    Anesthesia Type: MAC ASA Status: 3            Anesthesia Type: No value filed.    Brennon Phase I: Brennon Score: 8    Brennon Phase II:      Anesthesia Post Evaluation    Patient location during evaluation: PACU  Patient participation: complete - patient participated  Level of consciousness: awake and alert  Pain score: 1  Airway patency: patent  Nausea & Vomiting: no nausea and no vomiting  Cardiovascular status: hemodynamically stable  Respiratory status: acceptable  Hydration status: euvolemic  Pain management: adequate    No notable events documented.

## 2024-05-07 LAB
ANION GAP SERPL CALCULATED.3IONS-SCNC: 9 MMOL/L (ref 9–16)
BUN SERPL-MCNC: 11 MG/DL (ref 6–20)
CALCIUM SERPL-MCNC: 7.9 MG/DL (ref 8.6–10.4)
CHLORIDE SERPL-SCNC: 109 MMOL/L (ref 98–107)
CO2 SERPL-SCNC: 22 MMOL/L (ref 20–31)
CREAT SERPL-MCNC: 1.7 MG/DL (ref 0.7–1.2)
ERYTHROCYTE [DISTWIDTH] IN BLOOD BY AUTOMATED COUNT: 15.4 % (ref 11.8–14.4)
GFR, ESTIMATED: 46 ML/MIN/1.73M2
GLUCOSE SERPL-MCNC: 94 MG/DL (ref 74–99)
HCT VFR BLD AUTO: 23.9 % (ref 40.7–50.3)
HCT VFR BLD AUTO: 23.9 % (ref 40.7–50.3)
HCT VFR BLD AUTO: 25.2 % (ref 40.7–50.3)
HGB BLD-MCNC: 7.6 G/DL (ref 13–17)
HGB BLD-MCNC: 7.6 G/DL (ref 13–17)
HGB BLD-MCNC: 8.2 G/DL (ref 13–17)
MCH RBC QN AUTO: 27.9 PG (ref 25.2–33.5)
MCHC RBC AUTO-ENTMCNC: 31.8 G/DL (ref 28.4–34.8)
MCV RBC AUTO: 87.9 FL (ref 82.6–102.9)
NRBC BLD-RTO: 0 PER 100 WBC
PLATELET # BLD AUTO: 177 K/UL (ref 138–453)
PMV BLD AUTO: 10.7 FL (ref 8.1–13.5)
POTASSIUM SERPL-SCNC: 3.8 MMOL/L (ref 3.7–5.3)
RBC # BLD AUTO: 2.72 M/UL (ref 4.21–5.77)
SODIUM SERPL-SCNC: 140 MMOL/L (ref 136–145)
SURGICAL PATHOLOGY REPORT: NORMAL
WBC OTHER # BLD: 7.5 K/UL (ref 3.5–11.3)

## 2024-05-07 PROCEDURE — 80048 BASIC METABOLIC PNL TOTAL CA: CPT

## 2024-05-07 PROCEDURE — 2580000003 HC RX 258: Performed by: INTERNAL MEDICINE

## 2024-05-07 PROCEDURE — 6370000000 HC RX 637 (ALT 250 FOR IP): Performed by: INTERNAL MEDICINE

## 2024-05-07 PROCEDURE — 99231 SBSQ HOSP IP/OBS SF/LOW 25: CPT | Performed by: INTERNAL MEDICINE

## 2024-05-07 PROCEDURE — 6360000002 HC RX W HCPCS: Performed by: STUDENT IN AN ORGANIZED HEALTH CARE EDUCATION/TRAINING PROGRAM

## 2024-05-07 PROCEDURE — 2060000000 HC ICU INTERMEDIATE R&B

## 2024-05-07 PROCEDURE — 85014 HEMATOCRIT: CPT

## 2024-05-07 PROCEDURE — 6360000002 HC RX W HCPCS: Performed by: INTERNAL MEDICINE

## 2024-05-07 PROCEDURE — C9113 INJ PANTOPRAZOLE SODIUM, VIA: HCPCS | Performed by: INTERNAL MEDICINE

## 2024-05-07 PROCEDURE — 2580000003 HC RX 258: Performed by: STUDENT IN AN ORGANIZED HEALTH CARE EDUCATION/TRAINING PROGRAM

## 2024-05-07 PROCEDURE — APPNB30 APP NON BILLABLE TIME 0-30 MINS: Performed by: NURSE PRACTITIONER

## 2024-05-07 PROCEDURE — 85027 COMPLETE CBC AUTOMATED: CPT

## 2024-05-07 PROCEDURE — C9113 INJ PANTOPRAZOLE SODIUM, VIA: HCPCS | Performed by: STUDENT IN AN ORGANIZED HEALTH CARE EDUCATION/TRAINING PROGRAM

## 2024-05-07 PROCEDURE — 85018 HEMOGLOBIN: CPT

## 2024-05-07 PROCEDURE — 36415 COLL VENOUS BLD VENIPUNCTURE: CPT

## 2024-05-07 RX ADMIN — Medication 100 MG: at 09:40

## 2024-05-07 RX ADMIN — Medication 1 TABLET: at 09:39

## 2024-05-07 RX ADMIN — METOPROLOL SUCCINATE 25 MG: 25 TABLET, EXTENDED RELEASE ORAL at 09:40

## 2024-05-07 RX ADMIN — SODIUM CHLORIDE, PRESERVATIVE FREE 10 ML: 5 INJECTION INTRAVENOUS at 21:14

## 2024-05-07 RX ADMIN — SUCRALFATE 1 G: 1 TABLET ORAL at 18:16

## 2024-05-07 RX ADMIN — FOLIC ACID 1 MG: 1 TABLET ORAL at 09:42

## 2024-05-07 RX ADMIN — SUCRALFATE 1 G: 1 TABLET ORAL at 13:02

## 2024-05-07 RX ADMIN — ATORVASTATIN CALCIUM 40 MG: 40 TABLET, FILM COATED ORAL at 21:14

## 2024-05-07 RX ADMIN — SUCRALFATE 1 G: 1 TABLET ORAL at 21:14

## 2024-05-07 RX ADMIN — PANTOPRAZOLE SODIUM 8 MG/HR: 40 INJECTION, POWDER, FOR SOLUTION INTRAVENOUS at 09:41

## 2024-05-07 RX ADMIN — PANTOPRAZOLE SODIUM 8 MG/HR: 40 INJECTION, POWDER, FOR SOLUTION INTRAVENOUS at 04:06

## 2024-05-07 RX ADMIN — SUCRALFATE 1 G: 1 TABLET ORAL at 09:40

## 2024-05-07 RX ADMIN — SODIUM CHLORIDE, PRESERVATIVE FREE 10 ML: 5 INJECTION INTRAVENOUS at 09:40

## 2024-05-07 NOTE — PLAN OF CARE
Problem: Discharge Planning  Goal: Discharge to home or other facility with appropriate resources  5/6/2024 2024 by Zoie Ojeda RN  Outcome: Progressing  5/6/2024 1823 by Bing Moon RN  Outcome: Progressing  5/6/2024 1358 by Armani Lemon RN  Outcome: Progressing     Problem: Safety - Adult  Goal: Free from fall injury  5/6/2024 2024 by Zoie Ojeda RN  Outcome: Progressing  Flowsheets (Taken 5/6/2024 2000)  Free From Fall Injury: Instruct family/caregiver on patient safety  5/6/2024 1823 by Bing Moon RN  Outcome: Progressing  5/6/2024 1358 by Armani Lemon RN  Outcome: Progressing     Problem: ABCDS Injury Assessment  Goal: Absence of physical injury  5/6/2024 2024 by Zoie Ojeda RN  Outcome: Progressing  Flowsheets (Taken 5/6/2024 2000)  Absence of Physical Injury: Implement safety measures based on patient assessment  5/6/2024 1823 by Bing Moon RN  Outcome: Progressing  5/6/2024 1358 by Armani Lemon RN  Outcome: Progressing     Problem: Pain  Goal: Verbalizes/displays adequate comfort level or baseline comfort level  5/6/2024 2024 by Zoie Ojeda RN  Outcome: Progressing  Flowsheets (Taken 5/6/2024 1946)  Verbalizes/displays adequate comfort level or baseline comfort level: Encourage patient to monitor pain and request assistance  5/6/2024 1823 by Bing Moon RN  Outcome: Progressing  5/6/2024 1358 by Armani Lemon RN  Outcome: Progressing     Problem: Skin/Tissue Integrity  Goal: Absence of new skin breakdown  Description: 1.  Monitor for areas of redness and/or skin breakdown  2.  Assess vascular access sites hourly  3.  Every 4-6 hours minimum:  Change oxygen saturation probe site  4.  Every 4-6 hours:  If on nasal continuous positive airway pressure, respiratory therapy assess nares and determine need for appliance change or resting period.  5/6/2024 2024 by Zoie Ojeda RN  Outcome: Progressing  5/6/2024 1823 by Sarai

## 2024-05-07 NOTE — PROGRESS NOTES
[facility]  [place]GRESS NOTE    Shift date: 5/7/2024   Shift day: Tuesday   Shift # 1    Room # 0512/0512-01   Name: Angela Ghotra                Amish: [place]  Place of Yazdanism:     Referral: Routine Visit    Admit Date & Time: 4/27/2024  7:41 PM    Assessment:  Angela Ghotra is a 54 y.o. male in the hospital because of GI bleeding. Upon entering the room writer observes the patient was resting and he gave me good smile. The patient was very pleasant and welcoming. We both exchanged greetings. He invited me right in. Angela told me that he is from [place] and that he has a son graduating in two weeks. I congratulated him for that achievement. He stated that he is a Mu-ism. I said it is a pleasure to meet you. He spoke about his children and the extended family, which I heard with interest. He said he is feeling better.       Intervention:  Writer introduced self and title as   I asked Angela if I could be of any help for him. He could not think of any. I wished him well. 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 He appreciated it.     Outcome:  He thanked me and wished me a good day. It was nice meeting a nice person, I said. \"Likewise,\" he said.     Plan:  Chaplains will remain available to offer spiritual and emotional support as needed.      Electronically signed by Chaplain Marek, on 5/7/2024 at 1:53 PM.  [facility]  [phone]

## 2024-05-07 NOTE — PROGRESS NOTES
[facility]   Gastroenterology Progress Note    Angela Ghotra is a 54 y.o. male patient.  Hospitalization Day:10      Chief consult reason:   Shortness of breath    Subjective:  Pt seen and examined. No rectal bleeding overnight.   Patient had repeat endoscopy yesterday that showed the same thick folds that appear like gastric varices, friable with mucosa very easy to use-biopsies taken and pending.  Colonoscopy was unremarkable with no source of bleeding noted.   Yesterday patient had 2 meals that were regular diet until diet order was changed to full liquid.   Hemoglobin dropped overnight from 8.8-8 0.2-7.6 this a.m.  BUN has normalized    VITALS:  BP (!) 144/89   Pulse 77   Temp 97.7 °F (36.5 °C) (Oral)   Resp 20   Ht 1.702 m (5' 7.01\")   Wt 60.3 kg (132 lb 15 oz)   SpO2 99%   BMI 20.82 kg/m²   TEMPERATURE:  Current - Temp: 97.7 °F (36.5 °C); Max - Temp  Av.3 °F (36.8 °C)  Min: 97.7 °F (36.5 °C)  Max: 99 °F (37.2 °C)    Physical Assessment:  General appearance:  alert, cooperative and no distress  Mental Status:  oriented to person, place and time and normal affect  Lungs:  clear to auscultation bilaterally, normal effort  Heart:  regular rate and rhythm, no murmur  Abdomen:  soft, nontender, nondistended, normal bowel sounds, no masses, hepatomegaly, splenomegaly  Extremities:  no edema, redness, tenderness in the calves  Skin:  no gross lesions, rashes, induration    Data Review:    Labs and Imaging:     CBC:  Recent Labs     24  0220 24  0423 24  0214 24  1138 24  1857 24  0350 24  1148   WBC 8.5  --  10.0  --   --  7.5  --    HGB 7.1*   < > 8.4* 8.8* 8.2* 7.6* 7.6*   MCV 88.2  --  90.3  --   --  87.9  --    RDW 15.6*  --  14.9*  --   --  15.4*  --    PLT See Reflexed IPF Result  --  174  --   --  177  --     < > = values in this interval not displayed.         ANEMIA STUDIES:  No results for input(s): \"TIBC\", \"FERRITIN\", \"[id number]\",  unremarkable without evidence of pericholecystic fluid, wall thickening or stones.  Negative sonographic Gomez's sign. Common bile duct is within normal limits measuring 5 mm. RIGHT KIDNEY: The right kidney is grossly unremarkable without evidence of hydronephrosis. PANCREAS:  Mildly dilated pancreatic duct. OTHER: No evidence of right upper quadrant ascites.     1. Portal vein is patent with hepatopetal flow.  No convincing evidence of cirrhosis by sonographic technique. 2. Mildly dilated pancreatic duct.         Principal Problem:    GI bleed  Active Problems:    Alcohol use    Schatzki's ring    Hematemesis    Melena    Gastritis    Epigastric pain    Blood loss anemia    Elevated troponin    Hypokalemia    CKD (chronic kidney disease)  Resolved Problems:    * No resolved hospital problems. *       GI Impression:    1.  Acute upper GI bleed with hematemesis and bright red blood per rectum  - EUS - 05/01/2024 - Focal thickening of gastric sub-mucosa, R/o- lymphoma            -  No gastric varices seen  - EGD - 04/30/2024- Small gastric varices and portal hypertensive gastropathy  - EGD - 04/28/2024 Gastric varices and portal hypertensive gastropathy.  Exam limited due to blood clots along greater curvature  -EGD - 05/03/2024 -abnormal thickened erythematosus in gastric fold and proximal part and continuation with folds in the distal fundus-biopsies pending.  Friable mucosa with bleeding from biopsy site sprayed with Hemospray.   -Ongoing melena 5/5/2024, transfuse 1 unit PRBCs overnight, VSS  -EGD-5/6/2024-same endoscopic band findings as before.  Negative colonoscopy     2.  Acute blood loss anemia -ongoing    3. Chronic alcohol use x 40 years     4. HX of NSTEMI - (previous admission) suspected type II in stetting of acute anemia. Echo - preserved     Plan and Recommendations:    MRI/MRCP W/WO contrast to further examine pancrease and mass-but due to elevated   Confirmed w/ MRI contrast will be safe since GFR

## 2024-05-07 NOTE — PROGRESS NOTES
[facility]  Office: [phone]  Keny Nolan DO, Eliseo Corey DO, Jordan Love DO, Mina Pérez DO, Lela Pritchett MD, Muna Jaquez MD, Maisha Altamirano MD, Nicole Saunders MD,  Raheem Shaw MD, Sam Kern MD, Marilee Bautista MD,  Jacob Dinero DO, Corina Jaime MD, Armani Lee MD, Camden Nolan DO, Viky Osborne MD,  Carlos Macario DO, Eunice Cohn MD, Jyotsna Anne MD, Glenis Yip MD, Robert Lu MD,  Roshan Baker MD, Britta Stock MD, Nikos Ordaz MD, Jeffery Cox MD, Nathanael Cardoso MD, Joanie Newsome MD, Earl Chicas DO, David Causey DO, Maynor Kwok MD,  Addison Chand MD, Shirley Waterhouse, CNP,  Chelle Chan CNP, Mikel Wyatt, CNP,  Liliana Mojica, DNP, Karli Calle, CNP, Caitie Donahue, CNP, Phyllis Meade CNP, Dorothea Kevin, CNP, Lela Huitron, PA-C, Amy Jones PA-C, Latoya Gasca, CNP, Eri Fenton, CNP, Ofelia Whitley, CNP, Francheska Castro, CNP, Ebony Scott, CNP, Louise Terrazas, CNS, Jacqueline Owens, CNP, Lesvia Copeland CNP, Tracy Schwab, CNP         [facility]   IN-PATIENT SERVICE   [facility]    Progress Note    5/7/2024    9:01 AM    Name:   Angela Ghotra  MRN:     [id number]     Acct:      [id number]   Room:   12/0512-01   Day:  10  Admit Date:  4/27/2024  7:41 PM    PCP:   No primary care provider on file.  Code Status:  Full Code    Subjective:     C/C:   Chief Complaint   Patient presents with    Hematemesis    Rectal Bleeding     Interval History Status: improved.     Seen and examined  Denies any belly pain, N/V  Able to tolerate full liquid diet  Didnt  have any BM today    Brief History:   Per documentation  Angela Ghotra is a 54 years old male with a past medical history of CKD, HTN, Alcohol use disorder, gastric varices and portal HTN presented to the ED with the chief complaint of numerous episodes of hematemesis and melena. He had multiple episodes of hematemesis in the ED as  distended  Extremities:  no edema    Assessment:        Hospital Problems             Last Modified POA    * (Principal) GI bleed 4/27/2024 Yes    Alcohol use 4/28/2024 Yes    Schatzki's ring 4/28/2024 Yes    Hematemesis 4/28/2024 Yes    Melena 4/28/2024 Yes    Gastritis 4/28/2024 Yes    Epigastric pain 4/28/2024 Yes    Blood loss anemia 4/28/2024 Yes    Elevated troponin 4/28/2024 Yes    Hypokalemia 4/28/2024 Yes    CKD (chronic kidney disease) 4/28/2024 Yes       Plan:        Upper GI bleed from gastric varices:S/p 8-10 units prbc   performed EGD on 4/28/2024 which showed gastric varices and portal hypertensive gastropathy and no active bleeding.  4/30 - Repeat  EGD showed gastric varices grade 1.    5/1: EGD/EUS showed focal thickening of the gastric submucosa.  Biopsies obtained to rule out lymphoma.  No gastric varices present  Splenic ultrasound unremarkable. No liver cirrhosis, mildly dilated pancreatic duct on ultrasound liver  Underwent EGD and Colonoscopy on 5/6/24 which showed broad area in the upper body and fundus of the stomach with thickened folds with friable mucosa which can ooze blood easily. GI is mentioning that it could be Meniere's disease vs Portal HTN. Colonoscopy showed hemorrhoids but no active bleeding.   F/U on Gastric biopsy   MRI/MRCP W/WO contrast ordered per GI  Hemoglobin stable  On Protonix drip  Is on full liquid diet, advance as tolerated    Glenis Yip MD  5/7/2024  9:01 AM

## 2024-05-07 NOTE — PLAN OF CARE
Problem: Discharge Planning  Goal: Discharge to home or other facility with appropriate resources  Outcome: Progressing     Problem: Safety - Adult  Goal: Free from fall injury  Outcome: Progressing     Problem: ABCDS Injury Assessment  Goal: Absence of physical injury  Outcome: Progressing     Problem: Pain  Goal: Verbalizes/displays adequate comfort level or baseline comfort level  Outcome: Progressing     Problem: Skin/Tissue Integrity  Goal: Absence of new skin breakdown  Description: 1.  Monitor for areas of redness and/or skin breakdown  2.  Assess vascular access sites hourly  3.  Every 4-6 hours minimum:  Change oxygen saturation probe site  4.  Every 4-6 hours:  If on nasal continuous positive airway pressure, respiratory therapy assess nares and determine need for appliance change or resting period.  Outcome: Progressing     Problem: Gastrointestinal - Adult  Goal: Minimal or absence of nausea and vomiting  Outcome: Progressing  Goal: Maintains or returns to baseline bowel function  Outcome: Progressing  Goal: Maintains adequate nutritional intake  Outcome: Progressing     Problem: Metabolic/Fluid and Electrolytes - Adult  Goal: Electrolytes maintained within normal limits  Outcome: Progressing  Goal: Hemodynamic stability and optimal renal function maintained  Outcome: Progressing  Goal: Glucose maintained within prescribed range  Outcome: Progressing     Problem: Nutrition Deficit:  Goal: Optimize nutritional status  Outcome: Progressing     Problem: Neurosensory - Adult  Goal: Achieves stable or improved neurological status  Outcome: Progressing

## 2024-05-08 LAB
ANION GAP SERPL CALCULATED.3IONS-SCNC: 7 MMOL/L (ref 9–16)
BUN SERPL-MCNC: 7 MG/DL (ref 6–20)
CALCIUM SERPL-MCNC: 7.8 MG/DL (ref 8.6–10.4)
CHLORIDE SERPL-SCNC: 108 MMOL/L (ref 98–107)
CO2 SERPL-SCNC: 23 MMOL/L (ref 20–31)
CREAT SERPL-MCNC: 1.7 MG/DL (ref 0.7–1.2)
ERYTHROCYTE [DISTWIDTH] IN BLOOD BY AUTOMATED COUNT: 15.6 % (ref 11.8–14.4)
GFR, ESTIMATED: 46 ML/MIN/1.73M2
GLUCOSE SERPL-MCNC: 87 MG/DL (ref 74–99)
HCT VFR BLD AUTO: 25.4 % (ref 40.7–50.3)
HGB BLD-MCNC: 8.1 G/DL (ref 13–17)
MCH RBC QN AUTO: 28.6 PG (ref 25.2–33.5)
MCHC RBC AUTO-ENTMCNC: 31.9 G/DL (ref 28.4–34.8)
MCV RBC AUTO: 89.8 FL (ref 82.6–102.9)
NRBC BLD-RTO: 0 PER 100 WBC
PLATELET # BLD AUTO: 194 K/UL (ref 138–453)
PMV BLD AUTO: 10.3 FL (ref 8.1–13.5)
POTASSIUM SERPL-SCNC: 3.8 MMOL/L (ref 3.7–5.3)
RBC # BLD AUTO: 2.83 M/UL (ref 4.21–5.77)
SODIUM SERPL-SCNC: 138 MMOL/L (ref 136–145)
WBC OTHER # BLD: 8.4 K/UL (ref 3.5–11.3)

## 2024-05-08 PROCEDURE — 99232 SBSQ HOSP IP/OBS MODERATE 35: CPT | Performed by: FAMILY MEDICINE

## 2024-05-08 PROCEDURE — 85027 COMPLETE CBC AUTOMATED: CPT

## 2024-05-08 PROCEDURE — 80048 BASIC METABOLIC PNL TOTAL CA: CPT

## 2024-05-08 PROCEDURE — 6360000002 HC RX W HCPCS: Performed by: STUDENT IN AN ORGANIZED HEALTH CARE EDUCATION/TRAINING PROGRAM

## 2024-05-08 PROCEDURE — 2580000003 HC RX 258: Performed by: STUDENT IN AN ORGANIZED HEALTH CARE EDUCATION/TRAINING PROGRAM

## 2024-05-08 PROCEDURE — C9113 INJ PANTOPRAZOLE SODIUM, VIA: HCPCS | Performed by: STUDENT IN AN ORGANIZED HEALTH CARE EDUCATION/TRAINING PROGRAM

## 2024-05-08 PROCEDURE — 2580000003 HC RX 258: Performed by: INTERNAL MEDICINE

## 2024-05-08 PROCEDURE — 6370000000 HC RX 637 (ALT 250 FOR IP): Performed by: INTERNAL MEDICINE

## 2024-05-08 PROCEDURE — 99231 SBSQ HOSP IP/OBS SF/LOW 25: CPT | Performed by: INTERNAL MEDICINE

## 2024-05-08 PROCEDURE — 36415 COLL VENOUS BLD VENIPUNCTURE: CPT

## 2024-05-08 PROCEDURE — 2060000000 HC ICU INTERMEDIATE R&B

## 2024-05-08 RX ADMIN — SUCRALFATE 1 G: 1 TABLET ORAL at 10:03

## 2024-05-08 RX ADMIN — SODIUM CHLORIDE, POTASSIUM CHLORIDE, SODIUM LACTATE AND CALCIUM CHLORIDE: 600; 310; 30; 20 INJECTION, SOLUTION INTRAVENOUS at 16:48

## 2024-05-08 RX ADMIN — PANTOPRAZOLE SODIUM 8 MG/HR: 40 INJECTION, POWDER, FOR SOLUTION INTRAVENOUS at 16:55

## 2024-05-08 RX ADMIN — SUCRALFATE 1 G: 1 TABLET ORAL at 20:42

## 2024-05-08 RX ADMIN — FOLIC ACID 1 MG: 1 TABLET ORAL at 10:01

## 2024-05-08 RX ADMIN — ATORVASTATIN CALCIUM 40 MG: 40 TABLET, FILM COATED ORAL at 20:42

## 2024-05-08 RX ADMIN — PANTOPRAZOLE SODIUM 8 MG/HR: 40 INJECTION, POWDER, FOR SOLUTION INTRAVENOUS at 04:54

## 2024-05-08 RX ADMIN — Medication 100 MG: at 10:01

## 2024-05-08 RX ADMIN — SUCRALFATE 1 G: 1 TABLET ORAL at 16:55

## 2024-05-08 RX ADMIN — METOPROLOL SUCCINATE 25 MG: 25 TABLET, EXTENDED RELEASE ORAL at 10:01

## 2024-05-08 RX ADMIN — SODIUM CHLORIDE, PRESERVATIVE FREE 10 ML: 5 INJECTION INTRAVENOUS at 20:43

## 2024-05-08 RX ADMIN — SUCRALFATE 1 G: 1 TABLET ORAL at 12:08

## 2024-05-08 ASSESSMENT — ENCOUNTER SYMPTOMS
SINUS PRESSURE: 0
DIARRHEA: 0
BACK PAIN: 0
WHEEZING: 0
CONSTIPATION: 0
SHORTNESS OF BREATH: 0
VOMITING: 0
CHOKING: 0
RHINORRHEA: 0
COUGH: 0
CHEST TIGHTNESS: 0
ABDOMINAL PAIN: 0
NAUSEA: 0
VOICE CHANGE: 0

## 2024-05-08 NOTE — PLAN OF CARE
Problem: Gastrointestinal - Adult  Goal: Minimal or absence of nausea and vomiting  5/7/2024 2119 by Sowmya العراقي, RN  Outcome: Progressing  5/7/2024 1443 by Courtney Calabrese RN  Outcome: Progressing  Goal: Maintains or returns to baseline bowel function  5/7/2024 1443 by Courtney Calabrese, RN  Outcome: Progressing  Goal: Maintains adequate nutritional intake  5/7/2024 1443 by Courtney Calabrese, RN  Outcome: Progressing

## 2024-05-08 NOTE — PROGRESS NOTES
[facility]   Gastroenterology Progress Note    Angela Ghotra is a 54 y.o. male patient.  Hospitalization Day:11      Chief consult reason:   Shortness of breath    Subjective:  Pt seen and examined. No rectal bleeding overnight.   Pt scheduled for MRI today  NPO now. No nausea, vomiting, or rectal bleeding  Hgb stable    VITALS:  BP (!) 144/92   Pulse 74   Temp 98.2 °F (36.8 °C) (Oral)   Resp 16   Ht 1.702 m (5' 7.01\")   Wt 60.3 kg (132 lb 15 oz)   SpO2 96%   BMI 20.82 kg/m²   TEMPERATURE:  Current - Temp: 98.2 °F (36.8 °C); Max - Temp  Av.4 °F (36.9 °C)  Min: 98.2 °F (36.8 °C)  Max: 98.8 °F (37.1 °C)    Physical Assessment:  General appearance:  alert, cooperative and no distress  Mental Status:  oriented to person, place and time and normal affect  Lungs:  clear to auscultation bilaterally, normal effort  Heart:  regular rate and rhythm, no murmur  Abdomen:  soft, nontender, nondistended, normal bowel sounds, no masses, hepatomegaly, splenomegaly  Extremities:  no edema, redness, tenderness in the calves  Skin:  no gross lesions, rashes, induration    Data Review:    Labs and Imaging:     CBC:  Recent Labs     24  0214 24  1138 24  1857 24  0350 24  1148 24  1903 24  0306   WBC 10.0  --   --  7.5  --   --  8.4   HGB 8.4*   < > 8.2* 7.6* 7.6* 8.2* 8.1*   MCV 90.3  --   --  87.9  --   --  89.8   RDW 14.9*  --   --  15.4*  --   --  15.6*     --   --  177  --   --  194    < > = values in this interval not displayed.         ANEMIA STUDIES:  No results for input(s): \"TIBC\", \"FERRITIN\", \"BPZPRGGA65\", \"FOLATE\", \"OCCULTBLD\" in the last 72 hours.    Invalid input(s): \"LABIRON\"    BMP:  Recent Labs     24  0214 24  0350 24  0306    140 138   K 3.6* 3.8 3.8    109* 108*   CO2 20 22 23   BUN 14 11 7   CREATININE 1.7* 1.7* 1.7*   GLUCOSE 89 94 87   CALCIUM 8.0* 7.9* 7.8*         LFTS:  No results for input(s):   Please feel free to contact me with any questions or concerns.     [facility]   Tyler Myrick, APRN - CNP   [phone]  5/8/2024  2:17 PM    Estimated time of 20 mins reviewing chart, assessing patient and formulating plan of care      Attending Physician Statement  I have discussed the care of Angela Ghotra and I have examined the patient myselft and taken ros and hpi , including pertinent history and exam findings,  with the author of this note . I have reviewed the key elements of all parts of the encounter with the nurse practitioner/resident.  I agree with the assessment, plan and orders as documented by the above health care provider     Await MRI result  Elevated CA 19-9    Electronically signed by Jose Raul Cuevas MD     This note was created with the assistance of a speech-recognition program.  Although the intention is to generate a document that actually reflects the content of the visit, no guarantees can be provided that every mistake has been identified and corrected by editing.

## 2024-05-08 NOTE — PLAN OF CARE
Problem: Discharge Planning  Goal: Discharge to home or other facility with appropriate resources  Outcome: Progressing  Flowsheets (Taken 5/7/2024 2116 by Sowmya العراقي, RN)  Discharge to home or other facility with appropriate resources: Identify barriers to discharge with patient and caregiver     Problem: Safety - Adult  Goal: Free from fall injury  5/8/2024 0755 by Melody Minaya RN  Outcome: Progressing  Flowsheets (Taken 5/8/2024 0730)  Free From Fall Injury: Instruct family/caregiver on patient safety  5/7/2024 2119 by Sowmya العراقي RN  Outcome: Progressing     Problem: ABCDS Injury Assessment  Goal: Absence of physical injury  Outcome: Progressing  Flowsheets (Taken 5/8/2024 0730)  Absence of Physical Injury: Implement safety measures based on patient assessment     Problem: Pain  Goal: Verbalizes/displays adequate comfort level or baseline comfort level  Outcome: Progressing     Problem: Skin/Tissue Integrity  Goal: Absence of new skin breakdown  Description: 1.  Monitor for areas of redness and/or skin breakdown  2.  Assess vascular access sites hourly  3.  Every 4-6 hours minimum:  Change oxygen saturation probe site  4.  Every 4-6 hours:  If on nasal continuous positive airway pressure, respiratory therapy assess nares and determine need for appliance change or resting period.  Outcome: Progressing     Problem: Gastrointestinal - Adult  Goal: Minimal or absence of nausea and vomiting  5/8/2024 0755 by Melody Minaya RN  Outcome: Progressing  5/7/2024 2119 by Sowmya العراقي RN  Outcome: Progressing  Goal: Maintains or returns to baseline bowel function  Outcome: Progressing  Goal: Maintains adequate nutritional intake  Outcome: Progressing     Problem: Metabolic/Fluid and Electrolytes - Adult  Goal: Electrolytes maintained within normal limits  Outcome: Progressing  Flowsheets (Taken 5/7/2024 2116 by Sowmya العراقي, RN)  Electrolytes maintained within normal limits:

## 2024-05-08 NOTE — PROGRESS NOTES
Comprehensive Nutrition Assessment    Type and Reason for Visit: Reassess    Nutrition Recommendations/Plan:   Continue diet as ordered.   Initiate Ensure Plus TID and continue at discharge.   Continue thiamine and MVI at discharge.      Malnutrition Assessment:  Malnutrition Status: At risk for malnutrition (Comment)  Context: Acute Illness       Findings of the 6 clinical characteristics of malnutrition:  Energy Intake:  75% or less of estimated energy requirements for 7 or more days  Weight Loss:  No significant weight loss     Body Fat Loss:  No significant body fat loss     Muscle Mass Loss:  No significant muscle mass loss    Fluid Accumulation:  No significant fluid accumulation     Strength:  Not Performed    Nutrition Assessment:    Pt improving from a nutritional standpoint AEB tolerating a regular diet. Remains at risk for further nutritional compromise r/t admission d/t hematemesis and rectal bleeding. Tolerated a few meals on a regular diet and then was made NPO for MRI. GI following, may need repeat EUS for more biopsies vs IR biopsy d/t first biopsies being negative, but CA 19-9 still elevated. Noted underlying medical condition (PMHx h/o alcohol abuse , HTN; recent admission for GI bleed EGD showed Schatzki's ring, mild gastritis and gastric varices with portal hypertensive gastropathy).    Nutrition Related Findings:    Patient/ Family Comments: Per patient he is very hungry- he was NPO for an MRI. Per patient he was tolerating a regular diet ok. Patient liked the Ensure Clear that were ordered, but would prefer Ensure Plus.     Edema: none,per flowsheet  GI Function: LBM 05/07/24 per flowsheet  Wound: None       Labs:   No results found for: \"GLUF\", \"LABA1C\"  Recent Labs     05/06/24  0214 05/07/24  0350 05/08/24  0306    140 138   K 3.6* 3.8 3.8    109* 108*   GLUCOSE 89 94 87   BUN 14 11 7   CREATININE 1.7* 1.7* 1.7*     Medications: statin, folic acid, metoprolol, MVI, thiamine

## 2024-05-08 NOTE — PROGRESS NOTES
[facility]  Office: [phone]  Keny Nolan DO, Eliseo Corey DO, Jordan Love DO, Mina Pérez DO, Lela Pritchett MD, Muna Jaquez MD, Maisha Altamirano MD, Nicole Saunders MD,  Raheem Shaw MD, Sam Kern MD, Marilee Bautista MD,  Jacob Dinero DO, Corina Jaime MD, Armani Lee MD, Camden Nolan DO, Viky Osborne MD,  Carlos Macario DO, Eunice Cohn MD, Jyostna Anne MD, Glenis Yip MD, Robert Lu MD,  Roshan Baker MD, Britta Stock MD, Nikos Ordaz MD, Jeffery Cox MD, Nathanael Cardoso MD, Joanie Newsome MD, Earl Chicas DO, David Causey DO, Maynor Kwok MD,  Addison Chand MD, Shirley Waterhouse, CNP,  Chelle Chan CNP, Mikel Wyatt, CNP,  Liliana Mojica, JAVIER, Karli Calle, CNP, Caitie Donahue, CNP, Phyllis Meade CNP, Dorothea Kevin, CNP, Lela Huitron, PA-C, Amy Jones PA-C, Latoya Gasca, CNP, Eri Fenton, CNP, Ofelia Whitley CNP, Francheska Castro, CNP, Ebony Scott CNP, Louise Terrazas, CNS, Jacqueline Owens, CNP, Lesvia Copeland CNP, Tracy Schwab, CNP       [facility]      Daily Progress Note     Admit Date: 4/27/2024  Bed/Room No.  0512/0512-01  Admitting Physician : Maisha Altamirano MD  Code Status :Full Code  Hospital Day:  LOS: 11 days   Chief Complaint:     Chief Complaint   Patient presents with    Hematemesis    Rectal Bleeding     Principal Problem:    GI bleed  Active Problems:    Alcohol use    Schatzki's ring    Hematemesis    Melena    Gastritis    Epigastric pain    Blood loss anemia    Elevated troponin    Hypokalemia    CKD (chronic kidney disease)  Resolved Problems:    * No resolved hospital problems. *    Subjective :        Interval History/Significant events :  05/08/24    Patient is n.p.o. today for possible MRI abdomen and procedure.  He remains on Protonix IV.  Patient denies any nausea, vomiting.  Vitals - afebrile Greater than or equal to 92% on room air = 0Normal regular rate and rhythm  scleral icterus.     Conjunctiva/sclera: Conjunctivae normal.      Pupils: Pupils are equal, round, and reactive to light.   Neck:      Thyroid: No thyromegaly.      Vascular: No JVD.   Cardiovascular:      Rate and Rhythm: Normal rate and regular rhythm.      Pulses:           Dorsalis pedis pulses are 2+ on the right side and 2+ on the left side.      Heart sounds: Normal heart sounds. No murmur heard.  Pulmonary:      Effort: Pulmonary effort is normal.      Breath sounds: Normal breath sounds. No wheezing or rales.   Abdominal:      Palpations: Abdomen is soft. There is no mass.      Tenderness: There is no abdominal tenderness.   Musculoskeletal:      Cervical back: Full passive range of motion without pain and neck supple.   Lymphadenopathy:      Head:      Right side of head: No submandibular adenopathy.      Left side of head: No submandibular adenopathy.      Cervical: No cervical adenopathy.   Skin:     General: Skin is warm.   Neurological:      Mental Status: He is alert and oriented to person, place, and time.      Motor: No tremor.   Psychiatric:         Behavior: Behavior is cooperative.           Laboratory findings:    Recent Labs     05/06/24  0214 05/06/24  1138 05/07/24  0350 05/07/24  1148 05/07/24  1903 05/08/24  0306   WBC 10.0  --  7.5  --   --  8.4   HGB 8.4*   < > 7.6* 7.6* 8.2* 8.1*   HCT 27.0*   < > 23.9* 23.9* 25.2* 25.4*     --  177  --   --  194    < > = values in this interval not displayed.     Recent Labs     05/06/24  0214 05/07/24  0350 05/08/24  0306    140 138   K 3.6* 3.8 3.8    109* 108*   CO2 20 22 23   GLUCOSE 89 94 87   BUN 14 11 7   CREATININE 1.7* 1.7* 1.7*   CALCIUM 8.0* 7.9* 7.8*     No results for input(s): \"PROT\", \"LABALBU\", \"LABA1C\", \"U0BMQNS\", \"P2ZDJVI\", \"FT4\", \"TSH\", \"AST\", \"ALT\", \"LDH\", \"GGT\", \"ALKPHOS\", \"BILITOT\", \"BILIDIR\", \"AMMONIA\", \"AMYLASE\", \"LIPASE\", \"LACTATE\", \"CHOL\", \"HDL\", \"CHOLHDLRATIO\", \"TRIG\", \"VLDL\", \"BNP\", \"TROPONINI\", \"CKTOTAL\",  colon also appears mildly   edematous, favored to be secondary to the pancreatic inflammatory change   versus primary colitis/diverticulitis.  Recommend CT follow-up in 3 months to   ensure resolution.         XR CHEST PORTABLE   Final Result   No acute process.         MRI ABDOMEN W WO CONTRAST MRCP    (Results Pending)        Clinical Course : stable  Assessment and Plan  :        Upper GI bleed secondary to gastric ulcers  On Protonix infusion.  Chronic alcoholism with alcohol gastritis and ulcers -remains on Protonix infusion for severe gastric ulcers.  Also on Carafate.  Currently n.p.o., patient on liquid diet otherwise.  GI following.  Recommend complete alcohol abstinence.  Anemia of acute blood loss -hemoglobin stable.  Chronic kidney disease stage III-stable.  Essential hypertension -well-controlled.      Continue to monitor vitals , Intake / output ,  Cell count , HGB , Kidney function, oxygenation  as indicated .   Plan and updates discussed with patient ,  answers  explained to satisfaction.   Plan discussed with Staff Melody ABEBE     (This note is created with the assistance of a speech recognition program. While intending to generate a document that actually reflects the content of the visit, the document can still have some errors including those of syntax and sound a like substitutions which may escape proof reading. In such instances, actual meaning can be extrapolated by contextual diversion.)      Maisha Altamirano MD  5/8/2024

## 2024-05-09 ENCOUNTER — APPOINTMENT (OUTPATIENT)
Dept: MRI IMAGING | Age: 54
DRG: 377 | End: 2024-05-09
Payer: COMMERCIAL

## 2024-05-09 PROBLEM — K92.2 GI BLEED: Status: RESOLVED | Noted: 2024-04-04 | Resolved: 2024-05-09

## 2024-05-09 PROBLEM — K92.1 MELENA: Status: RESOLVED | Noted: 2024-04-28 | Resolved: 2024-05-09

## 2024-05-09 PROBLEM — K92.0 HEMATEMESIS: Status: RESOLVED | Noted: 2024-04-28 | Resolved: 2024-05-09

## 2024-05-09 PROBLEM — E87.6 HYPOKALEMIA: Status: RESOLVED | Noted: 2024-04-28 | Resolved: 2024-05-09

## 2024-05-09 PROBLEM — R79.89 ELEVATED TROPONIN: Status: RESOLVED | Noted: 2024-04-28 | Resolved: 2024-05-09

## 2024-05-09 PROBLEM — R10.13 EPIGASTRIC PAIN: Status: RESOLVED | Noted: 2024-04-28 | Resolved: 2024-05-09

## 2024-05-09 PROBLEM — D50.0 BLOOD LOSS ANEMIA: Status: RESOLVED | Noted: 2024-04-28 | Resolved: 2024-05-09

## 2024-05-09 LAB
ANION GAP SERPL CALCULATED.3IONS-SCNC: 6 MMOL/L (ref 9–16)
BUN SERPL-MCNC: 7 MG/DL (ref 6–20)
CALCIUM SERPL-MCNC: 8.3 MG/DL (ref 8.6–10.4)
CHLORIDE SERPL-SCNC: 109 MMOL/L (ref 98–107)
CO2 SERPL-SCNC: 25 MMOL/L (ref 20–31)
CREAT SERPL-MCNC: 2 MG/DL (ref 0.7–1.2)
ERYTHROCYTE [DISTWIDTH] IN BLOOD BY AUTOMATED COUNT: 15.7 % (ref 11.8–14.4)
GFR, ESTIMATED: 38 ML/MIN/1.73M2
GLUCOSE BLD-MCNC: 94 MG/DL (ref 75–110)
GLUCOSE SERPL-MCNC: 85 MG/DL (ref 74–99)
HCT VFR BLD AUTO: 25.5 % (ref 40.7–50.3)
HCT VFR BLD AUTO: 25.6 % (ref 40.7–50.3)
HGB BLD-MCNC: 7.9 G/DL (ref 13–17)
HGB BLD-MCNC: 8.2 G/DL (ref 13–17)
MCH RBC QN AUTO: 27.5 PG (ref 25.2–33.5)
MCHC RBC AUTO-ENTMCNC: 31 G/DL (ref 28.4–34.8)
MCV RBC AUTO: 88.9 FL (ref 82.6–102.9)
NRBC BLD-RTO: 0 PER 100 WBC
PLATELET # BLD AUTO: 219 K/UL (ref 138–453)
PMV BLD AUTO: 10.2 FL (ref 8.1–13.5)
POTASSIUM SERPL-SCNC: 3.9 MMOL/L (ref 3.7–5.3)
RBC # BLD AUTO: 2.87 M/UL (ref 4.21–5.77)
SODIUM SERPL-SCNC: 140 MMOL/L (ref 136–145)
WBC OTHER # BLD: 8.3 K/UL (ref 3.5–11.3)

## 2024-05-09 PROCEDURE — 6360000004 HC RX CONTRAST MEDICATION: Performed by: NURSE PRACTITIONER

## 2024-05-09 PROCEDURE — 6360000002 HC RX W HCPCS: Performed by: STUDENT IN AN ORGANIZED HEALTH CARE EDUCATION/TRAINING PROGRAM

## 2024-05-09 PROCEDURE — 85014 HEMATOCRIT: CPT

## 2024-05-09 PROCEDURE — 2580000003 HC RX 258: Performed by: INTERNAL MEDICINE

## 2024-05-09 PROCEDURE — 85018 HEMOGLOBIN: CPT

## 2024-05-09 PROCEDURE — 99231 SBSQ HOSP IP/OBS SF/LOW 25: CPT | Performed by: INTERNAL MEDICINE

## 2024-05-09 PROCEDURE — 2580000003 HC RX 258: Performed by: STUDENT IN AN ORGANIZED HEALTH CARE EDUCATION/TRAINING PROGRAM

## 2024-05-09 PROCEDURE — 74183 MRI ABD W/O CNTR FLWD CNTR: CPT

## 2024-05-09 PROCEDURE — A9576 INJ PROHANCE MULTIPACK: HCPCS | Performed by: NURSE PRACTITIONER

## 2024-05-09 PROCEDURE — 6370000000 HC RX 637 (ALT 250 FOR IP): Performed by: INTERNAL MEDICINE

## 2024-05-09 PROCEDURE — 85027 COMPLETE CBC AUTOMATED: CPT

## 2024-05-09 PROCEDURE — A4216 STERILE WATER/SALINE, 10 ML: HCPCS | Performed by: INTERNAL MEDICINE

## 2024-05-09 PROCEDURE — C9113 INJ PANTOPRAZOLE SODIUM, VIA: HCPCS | Performed by: STUDENT IN AN ORGANIZED HEALTH CARE EDUCATION/TRAINING PROGRAM

## 2024-05-09 PROCEDURE — 80048 BASIC METABOLIC PNL TOTAL CA: CPT

## 2024-05-09 PROCEDURE — 2060000000 HC ICU INTERMEDIATE R&B

## 2024-05-09 PROCEDURE — 36415 COLL VENOUS BLD VENIPUNCTURE: CPT

## 2024-05-09 PROCEDURE — 82947 ASSAY GLUCOSE BLOOD QUANT: CPT

## 2024-05-09 PROCEDURE — 99232 SBSQ HOSP IP/OBS MODERATE 35: CPT | Performed by: NURSE PRACTITIONER

## 2024-05-09 RX ORDER — 0.9 % SODIUM CHLORIDE 0.9 %
30 INTRAVENOUS SOLUTION INTRAVENOUS ONCE
Status: DISCONTINUED | OUTPATIENT
Start: 2024-05-09 | End: 2024-05-10 | Stop reason: HOSPADM

## 2024-05-09 RX ADMIN — SODIUM CHLORIDE, POTASSIUM CHLORIDE, SODIUM LACTATE AND CALCIUM CHLORIDE: 600; 310; 30; 20 INJECTION, SOLUTION INTRAVENOUS at 06:24

## 2024-05-09 RX ADMIN — SUCRALFATE 1 G: 1 TABLET ORAL at 17:30

## 2024-05-09 RX ADMIN — SUCRALFATE 1 G: 1 TABLET ORAL at 21:34

## 2024-05-09 RX ADMIN — ATORVASTATIN CALCIUM 40 MG: 40 TABLET, FILM COATED ORAL at 21:34

## 2024-05-09 RX ADMIN — Medication 1 TABLET: at 10:04

## 2024-05-09 RX ADMIN — PANTOPRAZOLE SODIUM 8 MG/HR: 40 INJECTION, POWDER, FOR SOLUTION INTRAVENOUS at 02:52

## 2024-05-09 RX ADMIN — SODIUM CHLORIDE, PRESERVATIVE FREE 10 ML: 5 INJECTION INTRAVENOUS at 10:04

## 2024-05-09 RX ADMIN — GADOTERIDOL 10 ML: 279.3 INJECTION, SOLUTION INTRAVENOUS at 09:28

## 2024-05-09 RX ADMIN — PANTOPRAZOLE SODIUM 8 MG/HR: 40 INJECTION, POWDER, FOR SOLUTION INTRAVENOUS at 14:32

## 2024-05-09 RX ADMIN — Medication 100 MG: at 10:04

## 2024-05-09 RX ADMIN — SUCRALFATE 1 G: 1 TABLET ORAL at 10:04

## 2024-05-09 RX ADMIN — FOLIC ACID 1 MG: 1 TABLET ORAL at 10:04

## 2024-05-09 RX ADMIN — METOPROLOL SUCCINATE 25 MG: 25 TABLET, EXTENDED RELEASE ORAL at 10:04

## 2024-05-09 RX ADMIN — SUCRALFATE 1 G: 1 TABLET ORAL at 14:14

## 2024-05-09 RX ADMIN — SODIUM CHLORIDE, PRESERVATIVE FREE 10 ML: 5 INJECTION INTRAVENOUS at 09:29

## 2024-05-09 ASSESSMENT — PAIN SCALES - GENERAL: PAINLEVEL_OUTOF10: 0

## 2024-05-09 NOTE — PROGRESS NOTES
[facility]   Gastroenterology Progress Note    Angela Ghotra is a 54 y.o. male patient.  Hospitalization Day:12      Chief consult reason:   Shortness of breath    Subjective:  Pt seen and examined. No rectal bleeding overnight.   Patient tolerating diet without any rectal bleeding  Hemoglobin stable  Patient had MRI/MRCP that showed normal liver morphology with subcentimeter simple appearing cyst in the right lobe, 2.8 cm low signal intensity area of the pancreatic tail corresponds with previously known mass    VITALS:  BP (!) 141/86   Pulse 70   Temp 98.8 °F (37.1 °C) (Oral)   Resp 16   Ht 1.702 m (5' 7.01\")   Wt 58.2 kg (128 lb 4.9 oz)   SpO2 100%   BMI 20.09 kg/m²   TEMPERATURE:  Current - Temp: 98.8 °F (37.1 °C); Max - Temp  Av.4 °F (36.9 °C)  Min: 98 °F (36.7 °C)  Max: 98.8 °F (37.1 °C)    Physical Assessment:  General appearance:  alert, cooperative and no distress  Mental Status:  oriented to person, place and time and normal affect  Lungs:  clear to auscultation bilaterally, normal effort  Heart:  regular rate and rhythm, no murmur  Abdomen:  soft, nontender, nondistended, normal bowel sounds, no masses, hepatomegaly, splenomegaly  Extremities:  no edema, redness, tenderness in the calves  Skin:  no gross lesions, rashes, induration    Data Review:    Labs and Imaging:     CBC:  Recent Labs     24  0350 24  1148 24  1903 24  0306 24  0609 24  1419   WBC 7.5  --   --  8.4 8.3  --    HGB 7.6* 7.6* 8.2* 8.1* 7.9* 8.2*   MCV 87.9  --   --  89.8 88.9  --    RDW 15.4*  --   --  15.6* 15.7*  --      --   --  194 219  --          ANEMIA STUDIES:  No results for input(s): \"TIBC\", \"FERRITIN\", \"RLNYRCXI51\", \"FOLATE\", \"OCCULTBLD\" in the last 72 hours.    Invalid input(s): \"LABIRON\"    BMP:  Recent Labs     24  0350 24  0306 24  0609    138 140   K 3.8 3.8 3.9   * 108* 109*   CO2 22 23 25   BUN 11 7 7   CREATININE 1.7*  1.7* 2.0*   GLUCOSE 94 87 85   CALCIUM 7.9* 7.8* 8.3*         LFTS:  No results for input(s): \"ALKPHOS\", \"ALT\", \"AST\", \"BILITOT\", \"BILIDIR\", \"LABALBU\" in the last 72 hours.      Amylase/Lipase and Ammonia:  No results for input(s): \"AMYLASE\", \"LIPASE\", \"AMMONIA\" in the last 72 hours.    Acute Hepatitis Panel:  No results found for: \"HEPBSAG\", \"HEPCAB\", \"HEPBIGM\", \"HEPAIGM\"    HCV Genotype:  No components found for: \"HEPATITISCGENOTYPE\"    HCV Quantitative:  No results found for: \"HCVQNT\"    LIVER WORK UP:    AFP  No results found for: \"AFP\"    Alpha 1 antitrypsin   No results found for: \"A1A\"    CONTRERAS  No results found for: \"CONTRERAS\"    AMA  No results found for: \"MITOAB\"    ASMA  No results found for: \"SMOOTHMUSCAB\"    PT/INR  No results for input(s): \"PROTIME\", \"INR\" in the last 72 hours.    Cancer Markers:  CEA:  No results for input(s): \"CEA\" in the last 72 hours.    Ca 125:  No results for input(s): \"\" in the last 72 hours.  Ca 19-9:   Invalid input(s): \"\"  AFP: No results for input(s): \"AFP\" in the last 72 hours.  Lactic acid:Invalid input(s): \"LACTIC ACID\"    Radiology Review:    5/9/2024 MRI MRCP    FINDINGS:  Liver: The liver demonstrates a normal signal intensity morphology.  There is  a subcentimeter simple appearing cyst seen in the right lobe.  No solid  hepatic lesion.     Gallbladder: Unremarkable     Biliary tree: Unremarkable     Pancreas: 2.8 cm low signal intensity areas seen on T1 weighted imaging  within the pancreatic tail corresponds areas seen on CT.  This corresponds to  mild intermediate signal seen on T2 weighted imaging suggesting edema.  Findings could relate to focal pancreatitis although the possible underlying  lesion is difficult to exclude an endoscopic ultrasound would be best to  assess this.  There is a small amount of free fluid seen in the left upper  quadrant which could suggest underlying pancreatitis.     Spleen: Unremarkable     Kidneys and ureters: Unremarkable    20 mins reviewing chart, assessing patient and formulating plan of care      Attending Physician Statement  I have discussed the care of Angela Musabazz and I have examined the patient myselft and taken ros and hpi , including pertinent history and exam findings,  with the author of this note . I have reviewed the key elements of all parts of the encounter with the nurse practitioner/resident.  I agree with the assessment, plan and orders as documented by the above health care provider     No sign of bleeding  Need further biopsies from the pancreatic lesion, may be repeat EUS as an outpatient in the next couple weeks      Electronically signed by Jose Raul Cuevas MD

## 2024-05-09 NOTE — PLAN OF CARE
Problem: Discharge Planning  Goal: Discharge to home or other facility with appropriate resources  5/8/2024 0755 by Melody Minaya RN  Outcome: Progressing  Flowsheets (Taken 5/7/2024 2116 by Sowmya العراقي RN)  Discharge to home or other facility with appropriate resources: Identify barriers to discharge with patient and caregiver     Problem: Safety - Adult  Goal: Free from fall injury  5/8/2024 2101 by Renard Lamas RN  Outcome: Progressing  5/8/2024 0755 by Melody Minaya RN  Outcome: Progressing  Flowsheets (Taken 5/8/2024 0730)  Free From Fall Injury: Instruct family/caregiver on patient safety     Problem: ABCDS Injury Assessment  Goal: Absence of physical injury  5/8/2024 2101 by Renard Lamas RN  Flowsheets (Taken 5/8/2024 2101)  Absence of Physical Injury: Implement safety measures based on patient assessment  5/8/2024 0755 by Melody Minaya RN  Outcome: Progressing  Flowsheets (Taken 5/8/2024 0730)  Absence of Physical Injury: Implement safety measures based on patient assessment     Problem: Pain  Goal: Verbalizes/displays adequate comfort level or baseline comfort level  5/8/2024 0755 by Melody Minaya RN  Outcome: Progressing     Problem: Skin/Tissue Integrity  Goal: Absence of new skin breakdown  Description: 1.  Monitor for areas of redness and/or skin breakdown  2.  Assess vascular access sites hourly  3.  Every 4-6 hours minimum:  Change oxygen saturation probe site  4.  Every 4-6 hours:  If on nasal continuous positive airway pressure, respiratory therapy assess nares and determine need for appliance change or resting period.  5/8/2024 2101 by Renard Lamas RN  Outcome: Progressing  5/8/2024 0755 by Melody Minaya RN  Outcome: Progressing     Problem: Gastrointestinal - Adult  Goal: Minimal or absence of nausea and vomiting  5/8/2024 0755 by Melody Minaya RN  Outcome: Progressing  Goal: Maintains or returns to baseline bowel function  5/8/2024 0755 by Melody Minaya  RN  Outcome: Progressing  Goal: Maintains adequate nutritional intake  5/8/2024 0755 by Melody Minaya RN  Outcome: Progressing     Problem: Metabolic/Fluid and Electrolytes - Adult  Goal: Electrolytes maintained within normal limits  5/8/2024 0755 by Melody Minaya RN  Outcome: Progressing  Flowsheets (Taken 5/7/2024 2116 by Sowmya العراقي RN)  Electrolytes maintained within normal limits: Monitor labs and assess patient for signs and symptoms of electrolyte imbalances  Goal: Hemodynamic stability and optimal renal function maintained  5/8/2024 0755 by Melody Minaya RN  Outcome: Progressing  Goal: Glucose maintained within prescribed range  5/8/2024 0755 by Melody Minaya RN  Outcome: Progressing     Problem: Nutrition Deficit:  Goal: Optimize nutritional status  5/8/2024 1500 by Harriett Gipson, JORDAN  Outcome: Progressing  Flowsheets (Taken 5/8/2024 1500)  Nutrient intake appropriate for improving, restoring, or maintaining nutritional needs:   Assess nutritional status and recommend course of action   Monitor oral intake, labs, and treatment plans   Recommend appropriate diets, oral nutritional supplements, and vitamin/mineral supplements  5/8/2024 0755 by Melody Minaya RN  Outcome: Progressing     Problem: Neurosensory - Adult  Goal: Achieves stable or improved neurological status  5/8/2024 0755 by Meldoy Minaya RN  Outcome: Progressing  Flowsheets (Taken 5/7/2024 2116 by Sowmya العراقي RN)  Achieves stable or improved neurological status: Assess for and report changes in neurological status

## 2024-05-09 NOTE — PROGRESS NOTES
Pt unable to find place to stay. Writer updated .   No beds available at [facility] today. Pt first on waiting list for bed at [facility] tomorrow.      Melody Minaya RN

## 2024-05-09 NOTE — CARE COORDINATION
Called [facility] and spoke with Laney to check on bed availability. No beds today. Pt is first on waiting list. Stated may have bed tomorrow. Spoke with pt - he has message into the mother of his children to see if he can stay with her until bed opens at [facility]. Stated he was waiting to hear back from her. If pt is discharged, he would need to call [address] tomorrow. Pt aware of this and has been staying in touch with [address]. They are aware he gets next open bed at [facility]. If pt still here tomorrow, SW will f/u

## 2024-05-09 NOTE — DISCHARGE SUMMARY
[facility]  Office: [phone]  Keny Nolan DO, Eliseo Corey DO, Jordan Love DO, Mina Pérez DO, Lela Pritchett MD, Muna Jaquez MD, Maisha Altamirano MD, Nicole Saunders MD,  Raheem Shaw MD, Sam Kern MD, Marilee Bautista MD,  Jacob Dinero DO, Corina Jaime MD, Armani Lee MD, Camden Nolan DO, Viky Osborne MD,  Carlos Macario DO, Eunice Cohn MD, Jyotsna Anne MD, Glenis Yip MD, Robert Lu MD,  Roshan Baker MD, Britta Stock MD, Nikos Ordaz MD, Jeffery Cox MD, Nathanael Cardoso MD, Joanie Newsome MD, Earl Chicas DO, David Causey DO, Maynor Kwok MD,  Addison Chand MD, Shirley Waterhouse, CNP,  Chelle Chan CNP, Mikel Wyatt, CNP,  Liliana Mojica, JAVIER, Karli Calle CNP, Caitie Donahue, CNP, Phyllis Meade CNP, Dorothea Kevin, CNP, Lela Huitron, PA-C, Amy Jones PA-C, Latoya Gasca, CNP, Eri Fenton, CNP, Ofelia Whitley, CNP, Francheska Castro, CNP, Ebony Scott, CNP, Louise Terrazas, CNS, Jacqueline Owens, CNP, Lesvia Copeland CNP, Tracy Schwab, CNP         [facility]   IN-PATIENT SERVICE   [facility]    Discharge Summary     Patient ID: Angela Ghotra  :  1970   MRN: [id number]     ACCOUNT:  [id number]   Patient's PCP: No primary care provider on file.  Admit Date: 2024   Discharge Date: 2024     Length of Stay: 12  Code Status:  Full Code  Admitting Physician: No admitting provider for patient encounter.  Discharge Physician: FADY Orona - NP     Active Discharge Diagnoses:     Hospital Problem Lists:  Principal Problem (Resolved):    GI bleed  Active Problems:    Alcohol use    Schatzki's ring    Gastritis    CKD (chronic kidney disease)  Resolved Problems:    Hematemesis    Melena    Epigastric pain    Blood loss anemia    Elevated troponin    Hypokalemia      Admission Condition:  fair     Discharged Condition: stable    Hospital Stay:     Hospital Course:  Angela dowling a 54  05/02/2024 01:36 AM    AST 21 05/02/2024 01:36 AM    BILITOT 0.7 05/02/2024 01:36 AM    ALBUMIN 2.6 05/02/2024 01:36 AM    LABGLOM 38 05/09/2024 06:09 AM    LABGLOM 46 04/30/2024 04:35 AM    CALCIUM 8.3 05/09/2024 06:09 AM     PT/INR:    Lab Results   Component Value Date/Time    PROTIME 15.7 04/28/2024 05:25 AM    INR 1.3 04/28/2024 05:25 AM     PTT:   Lab Results   Component Value Date/Time    APTT 30.8 05/04/2024 01:45 AM     FLP:  No results found for: \"CHOL\", \"TRIG\", \"HDL\"  U/A:    Lab Results   Component Value Date/Time    COLORU Yellow 04/04/2024 08:14 PM    TURBIDITY Clear 04/04/2024 08:14 PM    HGBUR NEGATIVE 04/04/2024 08:14 PM    PHUR 7.5 04/04/2024 08:14 PM    PROTEINU NEGATIVE 04/04/2024 08:14 PM    GLUCOSEU NEGATIVE 04/04/2024 08:14 PM    KETUA TRACE 04/04/2024 08:14 PM    BILIRUBINUR NEGATIVE 04/04/2024 08:14 PM    UROBILINOGEN Normal 04/04/2024 08:14 PM    NITRU NEGATIVE 04/04/2024 08:14 PM    LEUKOCYTESUR NEGATIVE 04/04/2024 08:14 PM     TSH:    Lab Results   Component Value Date/Time    TSH 6.06 04/04/2024 06:06 PM        Radiology:  MRI ABDOMEN W WO CONTRAST MRCP    Result Date: 5/9/2024  Stable focal lesions seen in the pancreatic tail that could relate to underlying neoplasm versus focal pancreatitis.  Recommend continued follow-up and correlation with endoscopic ultrasound if not previously performed. No worrisome hepatic lesion.     CTA ABDOMEN PELVIS W CONTRAST    Result Date: 5/7/2024  1. No evidence of acute arterial hemorrhage within the bowel. 2. Stable appearance of focal hypodensity along the tail of the pancreas with trace adjacent fluid.  The differential includes inflammatory change and a hypoenhancing mass.  Further evaluation with pancreas protocol MRI or follow-up contrast-enhanced CT of the abdomen and pelvis is recommended on a nonemergent basis. 3. Small left pleural effusion. 4. Mild splenomegaly.  Small focus of hypoenhancement along the posterolateral spleen could represent

## 2024-05-09 NOTE — CARE COORDINATION
Transitional planning-talked with patient. Plan is to go to [facility]-needs to call to see if they have an available bed. Needs ride. He Will call mother of his children if no bed is available.

## 2024-05-09 NOTE — PROGRESS NOTES
[facility]  Office: [phone]  Keny Nolan DO, Eliseo Corey DO, Jordan Love DO, Mina Pérez DO, Lela Pritchett MD, Muna Jaquez MD, Maisha Altamirano MD, Nicole Saunders MD,  Raheem Shaw MD, Sam Kern MD, Marilee Bautista MD,  Jacob Dinero DO, Corina Jaime MD, Armani Lee MD, Camden Nolan DO, Viky Osborne MD,  Carlos Macario DO, Eunice Cohn MD, Jyotsna Anne MD, Glenis Yip MD, Robert Lu MD,  Roshan Baker MD, Britta Stock MD, Nikos Ordaz MD, Jeffery Cox MD, Nathanael Cardoso MD, Joanie Newsome MD, Earl Chicas DO, David Causey DO, Maynor Kwok MD,  Addison Chand MD, Shirley Waterhouse, CNP,  Chelle Chan CNP, Mikel Wyatt, CNP,  Liliana Mojica, DNP, Karli Calle, CNP, Caitie Donahue, CNP, Phyllis Meade CNP, Dorothea Kevin, CNP, Lela Huitron, PA-C, Amy Jones PA-C, Latoya Gasca, CNP, Eri Fenton, CNP, Ofelia Whitley, CNP, Francheska Castro, CNP, Ebony Scott, CNP, Louise Terrazas, CNS, Jacqueline Owens, CNP, Lesvia Copeland CNP, Tracy Schwab, CNP         [facility]   IN-PATIENT SERVICE   [facility]    Progress Note    5/9/2024    10:46 AM    Name:   Angela Ghotra  MRN:     [id number]     Acct:      [id number]   Room:   0512/0512-01   Day:  12  Admit Date:  4/27/2024  7:41 PM    PCP:   No primary care provider on file.  Code Status:  Full Code    Subjective:     C/C:   Chief Complaint   Patient presents with    Hematemesis    Rectal Bleeding     Interval History Status: improving    Patient seen and evaluated in room sitting in bed.  No acute events overnight.  Hemoglobin continues to be low but stable.  Last blood transfusion was 2 days ago.  He is tolerating oral intake without hematemesis or melena.  MRI completed yesterday still demonstrating stable focal lesion in the pancreatic tail.  Discussed case with GI who are tentatively planning outpatient follow-up with IR for biopsy as previous biopsy  nontender, nondistended, normal bowel sounds, no masses, hepatomegaly, splenomegaly  Extremities:  no edema, redness, tenderness in the calves  Skin:  no gross lesions, rashes, induration    Assessment:        Hospital Problems             Last Modified POA    * (Principal) GI bleed 4/27/2024 Yes    Alcohol use 4/28/2024 Yes    Schatzki's ring 4/28/2024 Yes    Hematemesis 4/28/2024 Yes    Melena 4/28/2024 Yes    Gastritis 4/28/2024 Yes    Epigastric pain 4/28/2024 Yes    Blood loss anemia 4/28/2024 Yes    Elevated troponin 4/28/2024 Yes    Hypokalemia 4/28/2024 Yes    CKD (chronic kidney disease) 4/28/2024 Yes       Plan:        GI bleed: emergent EGD and showed large blood in stomach, but no bleeding was able to be identified.  Patient was continued on Protonix and octreotide infusion.  He underwent repeat EGD on 4/30/2024 which showed grade 1 gastric varices.  Splenic ultrasound was negative for any splenic vein thrombosis.  Patient had EUS on 5/1/2024 found to have gastric submucosal thickening.  Biopsies were taken to rule out lymphoma.  He continued to have melena and had repeat EGD on 5/6/2024 suggestive of thickened gastric folds to friable and bleed easily.  Patient also had colonoscopy on 5/6/2024 which showed hemorrhoids without any bleeding. MRI completed 5/8/2024 demonstrating stable focal lesion seen in the pancreatic tail, patient will need repeat EUS versus IR biopsy  Alcohol induced gastritis and ulcerations/chronic alcoholism: Continue Protonix, Carafate, diet per GIs recommendations.  Alcohol abstinence strongly encouraged.  Continue folic acid, thiamine, multivitamins  ABLA: Secondary to #1.  Hemoglobin this morning 7.9 down from 8.1 yesterday.  Patient is status post 13 UPRBCs, 1 unit of FFP and 1 unit of platelets  CASPER on CKD stage IIIb:  creatinine elevated this  morning.  Likely secondary to blood loss.  Primary hypertension: Continue home meds  Dyslipidemia: Continue statin  Continue

## 2024-05-10 VITALS
BODY MASS INDEX: 20.14 KG/M2 | HEART RATE: 78 BPM | HEIGHT: 67 IN | WEIGHT: 128.31 LBS | OXYGEN SATURATION: 100 % | SYSTOLIC BLOOD PRESSURE: 148 MMHG | RESPIRATION RATE: 14 BRPM | DIASTOLIC BLOOD PRESSURE: 91 MMHG | TEMPERATURE: 98.4 F

## 2024-05-10 PROCEDURE — C9113 INJ PANTOPRAZOLE SODIUM, VIA: HCPCS | Performed by: STUDENT IN AN ORGANIZED HEALTH CARE EDUCATION/TRAINING PROGRAM

## 2024-05-10 PROCEDURE — 2580000003 HC RX 258: Performed by: INTERNAL MEDICINE

## 2024-05-10 PROCEDURE — 99239 HOSP IP/OBS DSCHRG MGMT >30: CPT | Performed by: NURSE PRACTITIONER

## 2024-05-10 PROCEDURE — 6360000002 HC RX W HCPCS: Performed by: STUDENT IN AN ORGANIZED HEALTH CARE EDUCATION/TRAINING PROGRAM

## 2024-05-10 PROCEDURE — 6370000000 HC RX 637 (ALT 250 FOR IP): Performed by: INTERNAL MEDICINE

## 2024-05-10 PROCEDURE — 2580000003 HC RX 258: Performed by: STUDENT IN AN ORGANIZED HEALTH CARE EDUCATION/TRAINING PROGRAM

## 2024-05-10 RX ADMIN — SUCRALFATE 1 G: 1 TABLET ORAL at 09:44

## 2024-05-10 RX ADMIN — METOPROLOL SUCCINATE 25 MG: 25 TABLET, EXTENDED RELEASE ORAL at 09:44

## 2024-05-10 RX ADMIN — Medication 1 TABLET: at 09:43

## 2024-05-10 RX ADMIN — FOLIC ACID 1 MG: 1 TABLET ORAL at 09:44

## 2024-05-10 RX ADMIN — PANTOPRAZOLE SODIUM 8 MG/HR: 40 INJECTION, POWDER, FOR SOLUTION INTRAVENOUS at 01:22

## 2024-05-10 RX ADMIN — SODIUM CHLORIDE, PRESERVATIVE FREE 10 ML: 5 INJECTION INTRAVENOUS at 09:44

## 2024-05-10 RX ADMIN — SODIUM CHLORIDE, POTASSIUM CHLORIDE, SODIUM LACTATE AND CALCIUM CHLORIDE: 600; 310; 30; 20 INJECTION, SOLUTION INTRAVENOUS at 03:47

## 2024-05-10 RX ADMIN — SUCRALFATE 1 G: 1 TABLET ORAL at 12:50

## 2024-05-10 RX ADMIN — Medication 100 MG: at 09:44

## 2024-05-10 ASSESSMENT — PAIN SCALES - GENERAL: PAINLEVEL_OUTOF10: 0

## 2024-05-10 NOTE — PROGRESS NOTES
[facility]  Office: [phone]  Keny Nolan DO, Eliseo Corey DO, Jordan Love DO, Mina Pérez DO, Lela Pritchett MD, Muna Jaquez MD, Maisha Altamirano MD, Nicole Saunders MD,  Raheem Shaw MD, Sam Kern MD, Marilee Bautista MD,  Jacob Dinero DO, Corina Jaime MD, Armani Lee MD, Camden Nolan DO, Viky Osborne MD,  Carlos Macario DO, Eunice Cohn MD, Jyotsna Anne MD, Glenis Yip MD, Robert Lu MD,  Roshan Baker MD, Britta Stock MD, Nikos Ordaz MD, Jeffery Cox MD, Nathanael Cardoso MD, Joanie Newsome MD, Earl Chicas DO, David Causey DO, Maynor Kwok MD,  Addison Chand MD, Shirley Waterhouse, CNP,  Chelle Chan CNP, Mikel Wyatt, CNP,  Liliana Mojica, DNP, Karli Calle, CNP, Caitie Donahue, CNP, Phyllis Meade CNP, Dorothea Kevin, CNP, Lela Huitron, PA-C, Amy Jones PA-C, Latoya Gasca, CNP, Eri Fenton, CNP, Ofelia Whitley, CNP, Francheska Castro, CNP, Ebony Scott, CNP, Louise Terrazas, CNS, Jacqueline Owens, CNP, Lesvia Copeland CNP, Tracy Schwab, CNP         [facility]   IN-PATIENT SERVICE   [facility]    Progress Note    5/9/2024    10:46 AM    Name:   Angela Ghotra  MRN:     [id number]     Acct:      [id number]   Room:   0512/0512-01   Day:  12  Admit Date:  4/27/2024  7:41 PM    PCP:   No primary care provider on file.  Code Status:  Full Code    Subjective:     C/C:   Chief Complaint   Patient presents with    Hematemesis    Rectal Bleeding     Interval History Status: improving    Dc held due to patient not having a place to stay  Patient is to go to [facility] which is not open until 5  Discussed with nursing, stable for dc    Brief History:     Patient presented with melena, hematemesis.  Patient had low hemoglobin of 6 on admission and received 3 units PRBC transfusion.  CT chest pelvis was negative for any bleed.  Patient underwent emergent EGD and showed large blood in stomach, but no  FOREARM 1ML 04/04/2024 10:56 PM     Lab Results   Component Value Date/Time    CULTURE NO GROWTH 5 DAYS 04/04/2024 10:56 PM       Radiology:  MRI ABDOMEN W WO CONTRAST MRCP    Result Date: 5/9/2024  Stable focal lesions seen in the pancreatic tail that could relate to underlying neoplasm versus focal pancreatitis.  Recommend continued follow-up and correlation with endoscopic ultrasound if not previously performed. No worrisome hepatic lesion.     CTA ABDOMEN PELVIS W CONTRAST    Result Date: 5/7/2024  1. No evidence of acute arterial hemorrhage within the bowel. 2. Stable appearance of focal hypodensity along the tail of the pancreas with trace adjacent fluid.  The differential includes inflammatory change and a hypoenhancing mass.  Further evaluation with pancreas protocol MRI or follow-up contrast-enhanced CT of the abdomen and pelvis is recommended on a nonemergent basis. 3. Small left pleural effusion. 4. Mild splenomegaly.  Small focus of hypoenhancement along the posterolateral spleen could represent a small infarct.  The splenic vein is not visualized at the level of the splenic hilum, with thrombosis of the peripheral aspect of the splenic vein not excluded. 5. Scattered fluid throughout the small bowel without evidence of obstruction.  This finding is nonspecific but can be seen with gastroenteritis.       Physical Examination:        General appearance:  alert, cooperative and no distress  Mental Status:  oriented to person, place and time and normal affect  Lungs:  clear to auscultation bilaterally, normal effort  Heart:  regular rate and rhythm, no murmur  Abdomen:  soft, nontender, nondistended, normal bowel sounds, no masses, hepatomegaly, splenomegaly  Extremities:  no edema, redness, tenderness in the calves  Skin:  no gross lesions, rashes, induration    Assessment:        Hospital Problems             Last Modified POA    * (Principal) GI bleed 4/27/2024 Yes    Alcohol use 4/28/2024 Yes     Schatzki's ring 4/28/2024 Yes    Hematemesis 4/28/2024 Yes    Melena 4/28/2024 Yes    Gastritis 4/28/2024 Yes    Epigastric pain 4/28/2024 Yes    Blood loss anemia 4/28/2024 Yes    Elevated troponin 4/28/2024 Yes    Hypokalemia 4/28/2024 Yes    CKD (chronic kidney disease) 4/28/2024 Yes       Plan:        GI bleed: emergent EGD and showed large blood in stomach, but no bleeding was able to be identified.  Patient was continued on Protonix and octreotide infusion.  He underwent repeat EGD on 4/30/2024 which showed grade 1 gastric varices.  Splenic ultrasound was negative for any splenic vein thrombosis.  Patient had EUS on 5/1/2024 found to have gastric submucosal thickening.  Biopsies were taken to rule out lymphoma.  He continued to have melena and had repeat EGD on 5/6/2024 suggestive of thickened gastric folds to friable and bleed easily.  Patient also had colonoscopy on 5/6/2024 which showed hemorrhoids without any bleeding. MRI completed 5/8/2024 demonstrating stable focal lesion seen in the pancreatic tail, patient will need repeat EUS versus IR biopsy  Alcohol induced gastritis and ulcerations/chronic alcoholism: Continue Protonix, Carafate, diet per GIs recommendations.  Alcohol abstinence strongly encouraged.  Continue folic acid, thiamine, multivitamins  ABLA: Secondary to #1.  Hemoglobin this morning 7.9 down from 8.1 yesterday.  Patient is status post 13 UPRBCs, 1 unit of FFP and 1 unit of platelets  CASPER on CKD stage IIIb:  creatinine elevated this  morning.  Likely secondary to blood loss.  Primary hypertension: Continue home meds  Dyslipidemia: Continue statin  Continue Protonix, no full pharmacologic anticoagulation secondary to #1  Dispo:  home today    FADY Orona NP  5/9/2024  10:46 AM

## 2024-05-10 NOTE — CARE COORDINATION
Called [facility] and they stated they do not have any beds today.  Called [facility] - they do have beds available today. Pt is agreeable to going to [facility] until a bed opens up at [facility]. Pt to contact [facility] tomorrow but they stated they will also call him as he is first on waiting list.    [employer] arranged for 2:00p to [facility].   Conf # [id number]

## 2024-05-10 NOTE — CARE COORDINATION
Called [facility] - advised they may have a bed open up at noon. They would like SW to c/b around 12:15/12:30p

## 2024-05-10 NOTE — PLAN OF CARE
Problem: Discharge Planning  Goal: Discharge to home or other facility with appropriate resources  Outcome: Completed  Flowsheets (Taken 5/10/2024 0800)  Discharge to home or other facility with appropriate resources: Identify barriers to discharge with patient and caregiver     Problem: Safety - Adult  Goal: Free from fall injury  Outcome: Completed  Flowsheets (Taken 5/10/2024 0800)  Free From Fall Injury: Instruct family/caregiver on patient safety     Problem: ABCDS Injury Assessment  Goal: Absence of physical injury  Outcome: Completed  Flowsheets (Taken 5/10/2024 0800)  Absence of Physical Injury: Implement safety measures based on patient assessment     Problem: Pain  Goal: Verbalizes/displays adequate comfort level or baseline comfort level  Outcome: Completed     Problem: Skin/Tissue Integrity  Goal: Absence of new skin breakdown  Description: 1.  Monitor for areas of redness and/or skin breakdown  2.  Assess vascular access sites hourly  3.  Every 4-6 hours minimum:  Change oxygen saturation probe site  4.  Every 4-6 hours:  If on nasal continuous positive airway pressure, respiratory therapy assess nares and determine need for appliance change or resting period.  Outcome: Completed     Problem: Gastrointestinal - Adult  Goal: Minimal or absence of nausea and vomiting  Outcome: Completed  Flowsheets (Taken 5/10/2024 0800)  Minimal or absence of nausea and vomiting: Administer IV fluids as ordered to ensure adequate hydration  Goal: Maintains or returns to baseline bowel function  Outcome: Completed  Flowsheets (Taken 5/10/2024 0800)  Maintains or returns to baseline bowel function: Assess bowel function  Goal: Maintains adequate nutritional intake  Outcome: Completed  Flowsheets (Taken 5/10/2024 0800)  Maintains adequate nutritional intake: Monitor percentage of each meal consumed     Problem: Metabolic/Fluid and Electrolytes - Adult  Goal: Electrolytes maintained within normal limits  Outcome:

## 2024-05-10 NOTE — PLAN OF CARE
Problem: Discharge Planning  Goal: Discharge to home or other facility with appropriate resources  5/9/2024 2315 by Jayson Langford RN  Outcome: Adequate for Discharge  5/9/2024 1346 by Melody Minaya RN  Outcome: Progressing     Problem: Safety - Adult  Goal: Free from fall injury  5/9/2024 2315 by Jayson Langford RN  Outcome: Adequate for Discharge  5/9/2024 1346 by Melody Minaya RN  Outcome: Progressing  Flowsheets (Taken 5/9/2024 0800)  Free From Fall Injury: Instruct family/caregiver on patient safety     Problem: ABCDS Injury Assessment  Goal: Absence of physical injury  5/9/2024 2315 by Jayson Langford RN  Outcome: Adequate for Discharge  5/9/2024 1346 by Melody Minaya RN  Outcome: Progressing  Flowsheets (Taken 5/9/2024 0800)  Absence of Physical Injury: Implement safety measures based on patient assessment     Problem: Pain  Goal: Verbalizes/displays adequate comfort level or baseline comfort level  5/9/2024 2315 by Jayson Langford RN  Outcome: Adequate for Discharge  5/9/2024 1346 by Melody Minaya RN  Outcome: Progressing     Problem: Skin/Tissue Integrity  Goal: Absence of new skin breakdown  Description: 1.  Monitor for areas of redness and/or skin breakdown  2.  Assess vascular access sites hourly  3.  Every 4-6 hours minimum:  Change oxygen saturation probe site  4.  Every 4-6 hours:  If on nasal continuous positive airway pressure, respiratory therapy assess nares and determine need for appliance change or resting period.  5/9/2024 2315 by Jayson Langford RN  Outcome: Adequate for Discharge  5/9/2024 1346 by Melody Minaya RN  Outcome: Progressing     Problem: Gastrointestinal - Adult  Goal: Minimal or absence of nausea and vomiting  5/9/2024 2315 by Jayson Langford RN  Outcome: Adequate for Discharge  5/9/2024 1346 by Melody Minaya RN  Outcome: Progressing  Goal: Maintains or returns to baseline bowel function  5/9/2024 2315 by Jayson Langford RN  Outcome: Adequate for Discharge  5/9/2024 1346 by  Marimar, Jaisie, RN  Outcome: Progressing  Goal: Maintains adequate nutritional intake  5/9/2024 1346 by Melody Minaya RN  Outcome: Progressing     Problem: Metabolic/Fluid and Electrolytes - Adult  Goal: Electrolytes maintained within normal limits  5/9/2024 2315 by Jayson Langford RN  Outcome: Adequate for Discharge  5/9/2024 1346 by Melody Minaya RN  Outcome: Progressing  Goal: Hemodynamic stability and optimal renal function maintained  5/9/2024 2315 by Jayson Langford RN  Outcome: Adequate for Discharge  5/9/2024 1346 by Melody Minaya RN  Outcome: Progressing  Goal: Glucose maintained within prescribed range  5/9/2024 2315 by Jayson Langford RN  Outcome: Adequate for Discharge  5/9/2024 1346 by Melody Minaya RN  Outcome: Progressing     Problem: Neurosensory - Adult  Goal: Achieves stable or improved neurological status  5/9/2024 2315 by Jayson Langford RN  Outcome: Adequate for Discharge  5/9/2024 1346 by Melody Minaya RN  Outcome: Progressing     Problem: Nutrition Deficit:  Goal: Optimize nutritional status  5/9/2024 2315 by Jayson Langford RN  Outcome: Adequate for Discharge  5/9/2024 1346 by Melody Minaya RN  Outcome: Progressing

## 2024-06-04 ENCOUNTER — OFFICE VISIT (OUTPATIENT)
Dept: GASTROENTEROLOGY | Age: 54
End: 2024-06-04
Payer: COMMERCIAL

## 2024-06-04 VITALS
BODY MASS INDEX: 20.5 KG/M2 | SYSTOLIC BLOOD PRESSURE: 139 MMHG | DIASTOLIC BLOOD PRESSURE: 96 MMHG | HEIGHT: 67 IN | HEART RATE: 68 BPM | WEIGHT: 130.6 LBS

## 2024-06-04 DIAGNOSIS — K92.2 UPPER GI BLEED: Primary | ICD-10-CM

## 2024-06-04 PROCEDURE — 99214 OFFICE O/P EST MOD 30 MIN: CPT | Performed by: INTERNAL MEDICINE

## 2024-06-04 ASSESSMENT — ENCOUNTER SYMPTOMS
WHEEZING: 0
TROUBLE SWALLOWING: 0
DIARRHEA: 0
SORE THROAT: 0
NAUSEA: 0
ABDOMINAL DISTENTION: 0
CHOKING: 0
SINUS PRESSURE: 0
ANAL BLEEDING: 0
COUGH: 0
BACK PAIN: 0
VOICE CHANGE: 0
VOMITING: 0
RECTAL PAIN: 0
BLOOD IN STOOL: 0
CONSTIPATION: 0
ABDOMINAL PAIN: 0

## 2024-06-04 NOTE — PROGRESS NOTES
and without the administration of intravenous contrast.  After initial T2 axial and coronal images, thick slab, thin slab and 3D coronal MRCP sequences were obtained without the administration of intravenous contrast.  MIP images are provided for review. COMPARISON: 05/03/2024 HISTORY: ORDERING SYSTEM PROVIDED HISTORY: Please assess for pancreatic/liver cancer, mass or lesion TECHNOLOGIST PROVIDED HISTORY: Please do with pancreatic protocol Liver protocol too if possible please Please assess for pancreatic/liver cancer, mass or lesion Reason for Exam: Please assess for pancreatic/liver cancer, mass or lesion FINDINGS: Liver: The liver demonstrates a normal signal intensity morphology.  There is a subcentimeter simple appearing cyst seen in the right lobe.  No solid hepatic lesion. Gallbladder: Unremarkable Biliary tree: Unremarkable Pancreas: 2.8 cm low signal intensity areas seen on T1 weighted imaging within the pancreatic tail corresponds areas seen on CT.  This corresponds to mild intermediate signal seen on T2 weighted imaging suggesting edema. Findings could relate to focal pancreatitis although the possible underlying lesion is difficult to exclude an endoscopic ultrasound would be best to assess this.  There is a small amount of free fluid seen in the left upper quadrant which could suggest underlying pancreatitis. Spleen: Unremarkable Kidneys and ureters: Unremarkable Adrenal glands: Unremarkable Lymph nodes: No enlarged lymph nodes Bowel: The visualized bowel is within normal limits. Bone marrow: Within normal limits Lower chest: Unremarkable Vasculature: Patent portal venous system Miscellaneous: Nothing significant     Stable focal lesions seen in the pancreatic tail that could relate to underlying neoplasm versus focal pancreatitis.  Recommend continued follow-up and correlation with endoscopic ultrasound if not previously performed. No worrisome hepatic lesion.          PHYSICAL EXAMINATION: Vital

## 2024-07-25 ENCOUNTER — HOSPITAL ENCOUNTER (EMERGENCY)
Age: 54
Discharge: HOME | End: 2024-07-25

## 2025-08-24 ENCOUNTER — HOSPITAL ENCOUNTER (EMERGENCY)
Age: 55
Discharge: ANOTHER HOSPITAL | End: 2025-08-24
Payer: MEDICAID

## 2025-08-24 VITALS
DIASTOLIC BLOOD PRESSURE: 94 MMHG | HEART RATE: 86 BPM | SYSTOLIC BLOOD PRESSURE: 166 MMHG | WEIGHT: 90 LBS | TEMPERATURE: 98.1 F | RESPIRATION RATE: 18 BRPM | OXYGEN SATURATION: 99 % | BODY MASS INDEX: 14.12 KG/M2 | HEIGHT: 67 IN

## 2025-08-24 DIAGNOSIS — K63.1 BOWEL PERFORATION (HCC): Primary | ICD-10-CM

## 2025-08-24 DIAGNOSIS — Z09 POSTOP CHECK: ICD-10-CM

## 2025-08-24 LAB
ANION GAP SERPL CALCULATED.3IONS-SCNC: 13 MMOL/L (ref 9–16)
BASOPHILS # BLD: 0 K/UL (ref 0–0.2)
BASOPHILS NFR BLD: 0 % (ref 0–2)
BUN SERPL-MCNC: 19 MG/DL (ref 6–20)
CALCIUM SERPL-MCNC: 8.8 MG/DL (ref 8.6–10.4)
CHLORIDE SERPL-SCNC: 98 MMOL/L (ref 98–107)
CO2 SERPL-SCNC: 20 MMOL/L (ref 20–31)
CREAT SERPL-MCNC: 0.8 MG/DL (ref 0.7–1.2)
EOSINOPHIL # BLD: 0 K/UL (ref 0–0.44)
EOSINOPHILS RELATIVE PERCENT: 0 % (ref 1–4)
ERYTHROCYTE [DISTWIDTH] IN BLOOD BY AUTOMATED COUNT: 22.6 % (ref 11.8–14.4)
GFR, ESTIMATED: >90 ML/MIN/1.73M2
GLUCOSE BLD-MCNC: 70 MG/DL (ref 75–110)
GLUCOSE SERPL-MCNC: 69 MG/DL (ref 74–99)
HCT VFR BLD AUTO: 30 % (ref 40.7–50.3)
HGB BLD-MCNC: 10.1 G/DL (ref 13–17)
IMM GRANULOCYTES # BLD AUTO: 0.14 K/UL (ref 0–0.3)
IMM GRANULOCYTES NFR BLD: 1 %
LYMPHOCYTES NFR BLD: 0.68 K/UL (ref 1.1–3.7)
LYMPHOCYTES RELATIVE PERCENT: 5 % (ref 24–43)
MCH RBC QN AUTO: 25.1 PG (ref 25.2–33.5)
MCHC RBC AUTO-ENTMCNC: 33.7 G/DL (ref 28.4–34.8)
MCV RBC AUTO: 74.4 FL (ref 82.6–102.9)
MONOCYTES NFR BLD: 1.5 K/UL (ref 0.1–1.2)
MONOCYTES NFR BLD: 11 % (ref 3–12)
MORPHOLOGY: ABNORMAL
NEUTROPHILS NFR BLD: 83 % (ref 36–65)
NEUTS SEG NFR BLD: 11.28 K/UL (ref 1.5–8.1)
NRBC BLD-RTO: 0.1 PER 100 WBC
PLATELET # BLD AUTO: 550 K/UL (ref 138–453)
PMV BLD AUTO: 10.1 FL (ref 8.1–13.5)
POTASSIUM SERPL-SCNC: 4.3 MMOL/L (ref 3.7–5.3)
RBC # BLD AUTO: 4.03 M/UL (ref 4.21–5.77)
SODIUM SERPL-SCNC: 132 MMOL/L (ref 136–145)
WBC OTHER # BLD: 13.6 K/UL (ref 3.5–11.3)

## 2025-08-24 PROCEDURE — 96365 THER/PROPH/DIAG IV INF INIT: CPT

## 2025-08-24 PROCEDURE — 99285 EMERGENCY DEPT VISIT HI MDM: CPT

## 2025-08-24 PROCEDURE — 96376 TX/PRO/DX INJ SAME DRUG ADON: CPT

## 2025-08-24 PROCEDURE — 85025 COMPLETE CBC W/AUTO DIFF WBC: CPT

## 2025-08-24 PROCEDURE — 87040 BLOOD CULTURE FOR BACTERIA: CPT

## 2025-08-24 PROCEDURE — 82947 ASSAY GLUCOSE BLOOD QUANT: CPT

## 2025-08-24 PROCEDURE — 6360000002 HC RX W HCPCS

## 2025-08-24 PROCEDURE — 2580000003 HC RX 258

## 2025-08-24 PROCEDURE — 80048 BASIC METABOLIC PNL TOTAL CA: CPT

## 2025-08-24 PROCEDURE — 96367 TX/PROPH/DG ADDL SEQ IV INF: CPT

## 2025-08-24 PROCEDURE — 96366 THER/PROPH/DIAG IV INF ADDON: CPT

## 2025-08-24 PROCEDURE — 96375 TX/PRO/DX INJ NEW DRUG ADDON: CPT

## 2025-08-24 RX ORDER — FLUCONAZOLE 2 MG/ML
400 INJECTION, SOLUTION INTRAVENOUS ONCE
Status: COMPLETED | OUTPATIENT
Start: 2025-08-24 | End: 2025-08-24

## 2025-08-24 RX ORDER — MORPHINE SULFATE 4 MG/ML
4 INJECTION, SOLUTION INTRAMUSCULAR; INTRAVENOUS ONCE
Status: COMPLETED | OUTPATIENT
Start: 2025-08-24 | End: 2025-08-24

## 2025-08-24 RX ORDER — METRONIDAZOLE 500 MG/100ML
500 INJECTION, SOLUTION INTRAVENOUS ONCE
Status: COMPLETED | OUTPATIENT
Start: 2025-08-24 | End: 2025-08-24

## 2025-08-24 RX ORDER — METRONIDAZOLE 500 MG/1
500 TABLET ORAL ONCE
Status: DISCONTINUED | OUTPATIENT
Start: 2025-08-24 | End: 2025-08-24

## 2025-08-24 RX ADMIN — METRONIDAZOLE 500 MG: 500 INJECTION, SOLUTION INTRAVENOUS at 16:14

## 2025-08-24 RX ADMIN — MORPHINE SULFATE 4 MG: 4 INJECTION, SOLUTION INTRAMUSCULAR; INTRAVENOUS at 17:33

## 2025-08-24 RX ADMIN — FLUCONAZOLE 400 MG: 2 INJECTION, SOLUTION INTRAVENOUS at 15:16

## 2025-08-24 RX ADMIN — MORPHINE SULFATE 4 MG: 4 INJECTION, SOLUTION INTRAMUSCULAR; INTRAVENOUS at 15:08

## 2025-08-24 RX ADMIN — CEFEPIME 2000 MG: 2 INJECTION, POWDER, FOR SOLUTION INTRAVENOUS at 15:15

## 2025-08-24 ASSESSMENT — PAIN - FUNCTIONAL ASSESSMENT
PAIN_FUNCTIONAL_ASSESSMENT: 0-10

## 2025-08-24 ASSESSMENT — PAIN SCALES - GENERAL
PAINLEVEL_OUTOF10: 6
PAINLEVEL_OUTOF10: 8
PAINLEVEL_OUTOF10: 7
PAINLEVEL_OUTOF10: 8

## 2025-08-24 ASSESSMENT — PAIN DESCRIPTION - LOCATION
LOCATION: ABDOMEN
LOCATION: ABDOMEN

## 2025-08-24 ASSESSMENT — LIFESTYLE VARIABLES
HOW MANY STANDARD DRINKS CONTAINING ALCOHOL DO YOU HAVE ON A TYPICAL DAY: PATIENT DOES NOT DRINK
HOW OFTEN DO YOU HAVE A DRINK CONTAINING ALCOHOL: NEVER

## 2025-08-29 LAB
MICROORGANISM SPEC CULT: NORMAL
MICROORGANISM SPEC CULT: NORMAL
SERVICE CMNT-IMP: NORMAL
SERVICE CMNT-IMP: NORMAL
SPECIMEN DESCRIPTION: NORMAL
SPECIMEN DESCRIPTION: NORMAL

## (undated) DEVICE — SINGLE-USE BIOPSY FORCEPS: Brand: RADIAL JAW 4

## (undated) DEVICE — SYSTEM BX DIA22GA FN W/ PRE LD NDL SHARKCORE

## (undated) DEVICE — GLOVE ORANGE PI 7 1/2   MSG9075

## (undated) DEVICE — HEMOSPRAY ENDOSCOPIC HEMOSTAT: Brand: HEMOSPRAY